# Patient Record
Sex: FEMALE | Race: WHITE | Employment: OTHER | URBAN - METROPOLITAN AREA
[De-identification: names, ages, dates, MRNs, and addresses within clinical notes are randomized per-mention and may not be internally consistent; named-entity substitution may affect disease eponyms.]

---

## 2019-10-13 ENCOUNTER — HOSPITAL ENCOUNTER (EMERGENCY)
Age: 79
Discharge: HOME | End: 2019-10-13
Attending: STUDENT IN AN ORGANIZED HEALTH CARE EDUCATION/TRAINING PROGRAM
Payer: MEDICARE

## 2019-10-13 ENCOUNTER — HOSPITAL ENCOUNTER (EMERGENCY)
Dept: GENERAL RADIOLOGY | Age: 79
Discharge: HOME | End: 2019-10-13
Attending: STUDENT IN AN ORGANIZED HEALTH CARE EDUCATION/TRAINING PROGRAM
Payer: MEDICARE

## 2019-10-13 VITALS
RESPIRATION RATE: 18 BRPM | WEIGHT: 110 LBS | DIASTOLIC BLOOD PRESSURE: 55 MMHG | BODY MASS INDEX: 22.22 KG/M2 | OXYGEN SATURATION: 98 % | HEART RATE: 68 BPM | SYSTOLIC BLOOD PRESSURE: 128 MMHG | TEMPERATURE: 97.9 F
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
## 2019-10-13 PROCEDURE — 72170 X-RAY EXAM OF PELVIS: CPT

## 2019-10-13 PROCEDURE — 73080 X-RAY EXAM OF ELBOW: CPT

## 2019-10-13 PROCEDURE — 74011250637 HC RX REV CODE- 250/637: Performed by: STUDENT IN AN ORGANIZED HEALTH CARE EDUCATION/TRAINING PROGRAM

## 2019-10-13 PROCEDURE — 99285 EMERGENCY DEPT VISIT HI MDM: CPT

## 2019-10-13 RX ORDER — ACETAMINOPHEN 325 MG/1
650 TABLET ORAL ONCE
Status: COMPLETED | OUTPATIENT
Start: 2019-10-13 | End: 2019-10-13

## 2019-10-13 RX ADMIN — ACETAMINOPHEN 650 MG: 325 TABLET ORAL at 15:42

## 2019-10-13 NOTE — PROGRESS NOTES
10/13/2019  4:34 PM  Case management note    Received referral for patient numerous falls. Patient lives at the [facility]. She has aid 6 days a week (Veronica)  Patient uses a walker. She is very [place]. The aid helps her with ADL's and fixes meals that she can microwave  I spoke with her cousin who is her POA for all. Luevenia Patch .   We are going to talk on Wednesday to get a PCP because her stop taking Medicare and set up [place]  Will continue to follow as needed  Cira Hadley, [address]

## 2019-10-13 NOTE — ED PROVIDER NOTES
The history is provided by the patient. Fall   The fall occurred while standing. She fell from a height of ground level. She landed on hard floor. There was no blood loss. The point of impact was the right elbow (buttocks). The pain is mild. She was not ambulatory at the scene. There was no entrapment after the fall. Pertinent negatives include no abdominal pain, no vomiting, no headaches, no extremity weakness and no laceration. The risk factors include being elderly. The symptoms are aggravated by use of injured limb. She has tried rest for the symptoms. The treatment provided no relief.         Past Medical History:   Diagnosis Date    Arthritis     Chronic kidney disease     kidney stones    Chronic pain     right hip    Hypertension     Ill-defined condition     chronic diarrhea       Past Surgical History:   Procedure Laterality Date    HX APPENDECTOMY      HX ORTHOPAEDIC      bilateral carpal tunnel    HX TONSILLECTOMY           Family History:   Problem Relation Age of Onset    Cancer Father        Social History     Socioeconomic History    Marital status:      Spouse name: Not on file    Number of children: Not on file    Years of education: Not on file    Highest education level: Not on file   Occupational History    Not on file   Social Needs    Financial resource strain: Not on file    Food insecurity:     Worry: Not on file     Inability: Not on file    Transportation needs:     Medical: Not on file     Non-medical: Not on file   Tobacco Use    Smoking status: Never Smoker    Smokeless tobacco: Never Used   Substance and Sexual Activity    Alcohol use: No    Drug use: No    Sexual activity: Not on file   Lifestyle    Physical activity:     Days per week: Not on file     Minutes per session: Not on file    Stress: Not on file   Relationships    Social connections:     Talks on phone: Not on file     Gets together: Not on file     Attends Mandaen service: Not on file Active member of club or organization: Not on file     Attends meetings of clubs or organizations: Not on file     Relationship status: Not on file    Intimate partner violence:     Fear of current or ex partner: Not on file     Emotionally abused: Not on file     Physically abused: Not on file     Forced sexual activity: Not on file   Other Topics Concern    Not on file   Social History Narrative    Not on file         ALLERGIES: Pcn [penicillins] and Sulfa (sulfonamide antibiotics)    Review of Systems   Respiratory: Negative for cough and shortness of breath. Cardiovascular: Negative for chest pain. Gastrointestinal: Negative for abdominal pain and vomiting. Musculoskeletal: Positive for arthralgias and back pain. Negative for extremity weakness. Skin: Negative for wound. Neurological: Negative for syncope and headaches. All other systems reviewed and are negative. Vitals:    10/13/19 1504   BP: 126/69   Pulse: 78   Resp: 18   Temp: 98.3 °F (36.8 °C)   SpO2: 99%   Weight: 49.9 kg (110 lb)            Physical Exam   Constitutional: She is oriented to person, place, and time. She appears well-developed. No distress. HENT:   Head: Normocephalic and atraumatic. Eyes: Conjunctivae and EOM are normal.   Neck: Normal range of motion. Neck supple. Cardiovascular: Normal rate, regular rhythm and normal heart sounds. Pulmonary/Chest: Effort normal and breath sounds normal. No respiratory distress. Abdominal: Soft. There is no tenderness. There is no guarding. Musculoskeletal: Normal range of motion. She exhibits tenderness (mild over R elbow). She exhibits no edema or deformity. Neurological: She is alert and oriented to person, place, and time. She exhibits normal muscle tone. Skin: Skin is warm and dry. No laceration noted. MDM       Procedures    The patient presented with a complaint of a fall or minor trauma.  The patient is now resting comfortably and feels better, is alert and in no distress. The patient has a normal mental status and is neurologically intact. The history, exam, diagnostic testing (if any) and current condition do not demonstrate signs of clinically significant intra-cranial, intra-thoracic, intra-abdominal, or musculoskeletal trauma. The vital signs have been stable. The patient's condition is stable and appropriate for discharge. The patient will pursue further outpatient evaluation with the primary care physician or other designated or consulting physician as indicated in the discharge instructions.

## 2019-10-13 NOTE — ED TRIAGE NOTES
Patient arrives from assisted living apartments [address] [place] [address] at [facility] with [facility] and R elbow pain. Denies striking head/LOC. Uses a Rolator.

## 2019-10-13 NOTE — DISCHARGE INSTRUCTIONS
Patient Education        Preventing Falls: Care Instructions  Your Care Instructions    Getting around your home safely can be a challenge if you have injuries or health problems that make it easy for you to fall. Loose rugs and furniture in walkways are among the dangers for many older people who have problems walking or who have poor eyesight. People who have conditions such as arthritis, osteoporosis, or dementia also have to be careful not to fall. You can make your home safer with a few simple measures. Follow-up care is a key part of your treatment and safety. Be sure to make and go to all appointments, and call your doctor if you are having problems. It's also a good idea to know your test results and keep a list of the medicines you take. How can you care for yourself at home? Taking care of yourself  · You may get dizzy if you do not drink enough water. To prevent dehydration, drink plenty of fluids, enough so that your urine is light yellow or clear like water. Choose water and other caffeine-free clear liquids. If you have kidney, heart, or liver disease and have to limit fluids, talk with your doctor before you increase the amount of fluids you drink. · Exercise regularly to improve your strength, muscle tone, and balance. Walk if you can. Swimming may be a good choice if you cannot walk easily. · Have your vision and hearing checked each year or any time you notice a change. If you have trouble seeing and hearing, you might not be able to avoid objects and could lose your balance. · Know the side effects of the medicines you take. Ask your doctor or pharmacist whether the medicines you take can affect your balance. Sleeping pills or sedatives can affect your balance. · Limit the amount of alcohol you drink. Alcohol can impair your balance and other senses. · Ask your doctor whether calluses or corns on your feet need to be removed.  If you wear loose-fitting shoes because of calluses or corns, you can lose your balance and fall. · Talk to your doctor if you have numbness in your feet. Preventing falls at home  · Remove raised doorway thresholds, throw rugs, and clutter. Repair loose carpet or raised areas in the floor. · Move furniture and electrical cords to keep them out of walking paths. · Use nonskid floor wax, and wipe up spills right away, especially on ceramic tile floors. · If you use a walker or cane, put rubber tips on it. If you use crutches, clean the bottoms of them regularly with an abrasive pad, such as steel wool. · Keep your house well lit, especially Brenna Dowse, and outside walkways. Use night-lights in areas such as hallways and bathrooms. Add extra light switches or use remote switches (such as switches that go on or off when you clap your hands) to make it easier to turn lights on if you have to get up during the night. · Install sturdy handrails on stairways. · Move items in your cabinets so that the things you use a lot are on the lower shelves (about waist level). · Keep a cordless phone and a flashlight with new batteries by your bed. If possible, put a phone in each of the main rooms of your house, or carry a cell phone in case you fall and cannot reach a phone. Or, you can wear a device around your neck or wrist. You push a button that sends a signal for help. · Wear low-heeled shoes that fit well and give your feet good support. Use footwear with nonskid soles. Check the heels and soles of your shoes for wear. Repair or replace worn heels or soles. · Do not wear socks without shoes on wood floors. · Walk on the grass when the sidewalks are slippery. If you live in an area that gets snow and ice in the winter, sprinkle salt on slippery steps and sidewalks. Preventing falls in the bath  · Install grab bars and nonskid mats inside and outside your shower or tub and near the toilet and sinks. · Use shower chairs and bath benches.   · Use a hand-held shower head that will allow you to sit while showering. · Get into a tub or shower by putting the weaker leg in first. Get out of a tub or shower with your strong side first.  · Repair loose toilet seats and consider installing a raised toilet seat to make getting on and off the toilet easier. · Keep your bathroom door unlocked while you are in the shower. Where can you learn more? Go to http://sidney-adalberto.info/. Enter [id number] [id number] [phone] in the search box to learn more about \"Preventing Falls: Care Instructions. \"  Current as of: March 16, 2018  Content Version: 11.8  © 3651-2172 Healthwise, [employer]. Care instructions adapted under license by [employer] (which disclaims liability or warranty for this information). If you have questions about a medical condition or this instruction, always ask your healthcare professional. [address] any warranty or liability for your use of this information.

## 2019-10-16 NOTE — PROGRESS NOTES
10/16/2019  10:16 AM  Case management note    Set up PCP appointment for Thursday October 24th @ 1045.    Set up [facility] through [place] of [address] [place] [address]

## 2019-10-31 ENCOUNTER — OFFICE VISIT (OUTPATIENT)
Dept: FAMILY MEDICINE CLINIC | Age: 79
End: 2019-10-31

## 2019-10-31 VITALS
TEMPERATURE: 98.6 F | WEIGHT: 110 LBS | HEIGHT: 59 IN | BODY MASS INDEX: 22.18 KG/M2 | OXYGEN SATURATION: 99 % | SYSTOLIC BLOOD PRESSURE: 136 MMHG | HEART RATE: 55 BPM | DIASTOLIC BLOOD PRESSURE: 76 MMHG

## 2019-10-31 DIAGNOSIS — R29.6 RECURRENT FALLS WHILE WALKING: Primary | ICD-10-CM

## 2019-10-31 NOTE — PROGRESS NOTES
[address] [address]   Office [phone], Fax [phone]    Subjective:     Chief Complaint   Patient presents with   [facility] Follow Up     PT was seen in ED due to numerous falls       HPI:  Lurdes Marroquin is a 67 y.o. female with PMHx significant for Glaucoma, HTN, Arthritis, and Down's syndrome who presents for Post ED follow up for recent fall on 10/13/2019. Isidore  from ground level height and landed on floor, landed on right elbow and buttocks. No bruising noted. Denied hitting head, headache, weakness, laceration, and LOC. Currently denies increased falling, reports falling because she doesn't take it easy and rushes. She denied feeling dizzy or lightheaded and does not report loss of balance. She denies CP, SOB, headache, abdominal pain, n/v/d/c, dysuria. Has a Caregiver, Ramonita Manrique, taking care of patient for about a year, 9AM - 2PM Monday thru Saturday. Lives by herself in assisted living apartments at [facility]. Uses [employer]. Also has physical therapy twice a week to work with walking/balance. Per chart review Xray of right elbow and pelvis showed no fractures and minimal oseoarthritis. Labs were unremarkable minus an elevated WBC of 13 w/ minor left shift, caregiver report increased frequency in urination but no dysuria or oliguria. Polyuria has since resolved. Past Medical Hx  I personally reviewed. Past Medical History:   Diagnosis Date    Arthritis     Chronic kidney disease     kidney stones    Chronic pain     right hip    Hypertension     Ill-defined condition     chronic diarrhea        SocHx   I personally reviewed. Social History     Tobacco Use    Smoking status: Never Smoker    Smokeless tobacco: Never Used   Substance Use Topics    Alcohol use: No    Drug use: No        Allergies  I personally reviewed.   Allergies   Allergen Reactions    Pcn [Penicillins] Itching    Sulfa (Sulfonamide Antibiotics) Itching        Medications  I personally reviewed. Current Outpatient Medications on File Prior to Visit   Medication Sig Dispense Refill    loperamide (IMMODIUM) 2 mg tablet Take 2 mg by mouth four (4) times daily as needed for Diarrhea. Indications: DIARRHEA      Hydrochlorothiazide (HYDRODIURIL) 12.5 mg tablet Take 1 tablet by mouth daily. 30 tablet 0    HYDROcodone-acetaminophen (NORCO) 5-325 mg per tablet Take 2 Tabs by mouth every four (4) hours as needed for Pain. Max Daily Amount: 12 Tabs. 40 Tab 0    acyclovir (ZOVIRAX) 800 mg tablet Take 1 Tab by mouth daily. 30 Tab 1     No current facility-administered medications on file prior to visit. ROS:   A comprehensive review of systems was negative except for that written in the HPI. Objective:   Vitals  I personally reviewed. HR repeated, Manual pulse of 63. Visit Vitals  /76   Pulse (!) 55   Temp 98.6 °F (37 °C)   Ht 4' 11\" (1.499 m)   Wt 110 lb (49.9 kg)   SpO2 99%   BMI 22.22 kg/m²      Physical exam:   GEN: NAD, AO x 3. In wheelchair. HEAD: NC/AT. EYES: Sclera white, PERRLA, EOMI        LUNGS: Chest symmetric, respirations unlabored; CTAB anteriorly and posteriorly. no w/r/r. CARDIOVASCULAR: RRR, S1S2 present, w/o m/g/r, no LE edema. ABDOMEN: Soft, NT, ND, +bowel sounds x 4, no rebound tenderness, no guarding. no masses or organomegaly. NEUROLOGIC: No focal neurologic deficits. Strength and sensation grossly intact. MSK: Strenght 5/5, FROM and good tone in all extremities. EXT: Well perfused, no edema, no erythema. PSYCH: Appropriate mood and affect, good insight and judgement, cooperative. SKIN: No obvious rashes or lesions. No ecchymosis. I personally reviewed the following Pertinent Labs/Studies:   - Hospitalization Xray elbow and wrist, and hospitalization labs CMP, CBC    Assessment and orders:     1. Recurrent falls while walking  - Patient requires walker and sometimes moves to fast and falls.   - Is not associated with any other symptoms like LOC, SOB, CP, dizziness  - Recommend patient take smaller steps and move slower, and continue working with PT therapy. - No concern at this time for other etiology such as infection, CVA/TIA, or CAD/HF/MI. Pt was discussed with Dr Sami Mccarthy. I have reviewed patient medical and social history and medications. I have reviewed pertinent labs results and other data. I have discussed the diagnosis with the patient and the intended plan as seen in the above orders. The patient has received an after-visit summary and questions were answered concerning future plans. I have discussed medication side effects and warnings with the patient as well.     Avel Dsouza MD  Resident [facility]  10/31/19

## 2019-10-31 NOTE — PATIENT INSTRUCTIONS

## 2019-10-31 NOTE — PROGRESS NOTES
Chief Complaint   Patient presents with   [facility] Follow Up     PT was seen in ED due to numerous falls

## 2019-11-20 ENCOUNTER — OFFICE VISIT (OUTPATIENT)
Dept: FAMILY MEDICINE CLINIC | Age: 79
End: 2019-11-20

## 2019-11-20 VITALS
TEMPERATURE: 97.7 F | HEART RATE: 60 BPM | RESPIRATION RATE: 18 BRPM | OXYGEN SATURATION: 99 % | SYSTOLIC BLOOD PRESSURE: 128 MMHG | DIASTOLIC BLOOD PRESSURE: 78 MMHG

## 2019-11-20 DIAGNOSIS — G89.29 CHRONIC PAIN OF RIGHT KNEE: Primary | ICD-10-CM

## 2019-11-20 DIAGNOSIS — Z91.81 AT HIGH RISK FOR INJURY RELATED TO FALL: ICD-10-CM

## 2019-11-20 DIAGNOSIS — M25.561 CHRONIC PAIN OF RIGHT KNEE: Primary | ICD-10-CM

## 2019-11-20 NOTE — PROGRESS NOTES
Identified Patient with two Patient identifiers (Name and ). Two Patient Identifiers confirmed. Reviewed record in preparation for visit and have obtained necessary documentation. Chief Complaint   Patient presents with    Fall     caregiver is concerned about right leg - patient denies pain today but it is unable to be steady       Visit Vitals  /78 (BP 1 Location: Right arm, BP Patient Position: Sitting)   Pulse 60   Temp 97.7 °F (36.5 °C) (Oral)   Resp 18   SpO2 99%       1. Have you been to the ER, urgent care clinic since your last visit? Hospitalized since your last visit? No    2. Have you seen or consulted any other health care providers outside of the [address] [place] [address] since your last visit? Include any pap smears or colon screening.  No

## 2019-11-20 NOTE — PROGRESS NOTES
Solo Mayo is a 67 y.o. female here today to address the following issues:  Chief Complaint   Patient presents with   Jackie Zaman Fall     caregiver is concerned about right leg - patient denies pain today but it is unable to be steady     Patients walks with a rolling walker and when she stands up, caretaker says patient has isyes ambulating. Sometimes has had some tremors and had a fall about 2 months ago. EMS came and helped put her back in the chair. Patient is deaf. Patient in wheelchair.       Past Medical History:   Diagnosis Date    Arthritis     Chronic kidney disease     kidney stones    Chronic pain     right hip    Hypertension     Ill-defined condition     chronic diarrhea     Past Surgical History:   Procedure Laterality Date    HX APPENDECTOMY      HX ORTHOPAEDIC      bilateral carpal tunnel    HX TONSILLECTOMY       Social History     Socioeconomic History    Marital status:      Spouse name: Not on file    Number of children: Not on file    Years of education: Not on file    Highest education level: Not on file   Occupational History    Not on file   Social Needs    Financial resource strain: Not on file    Food insecurity:     Worry: Not on file     Inability: Not on file    Transportation needs:     Medical: Not on file     Non-medical: Not on file   Tobacco Use    Smoking status: Never Smoker    Smokeless tobacco: Never Used   Substance and Sexual Activity    Alcohol use: No    Drug use: No    Sexual activity: Not on file   Lifestyle    Physical activity:     Days per week: Not on file     Minutes per session: Not on file    Stress: Not on file   Relationships    Social connections:     Talks on phone: Not on file     Gets together: Not on file     Attends Judaism service: Not on file     Active member of club or organization: Not on file     Attends meetings of clubs or organizations: Not on file     Relationship status: Not on file    Intimate partner violence: Fear of current or ex partner: Not on file     Emotionally abused: Not on file     Physically abused: Not on file     Forced sexual activity: Not on file   Other Topics Concern    Not on file   Social History Narrative    Not on file       Allergies   Allergen Reactions    Pcn [Penicillins] Itching    Sulfa (Sulfonamide Antibiotics) Itching       Current Outpatient Medications   Medication Sig    loperamide (IMMODIUM) 2 mg tablet Take 2 mg by mouth four (4) times daily as needed for Diarrhea. Indications: DIARRHEA    Hydrochlorothiazide (HYDRODIURIL) 12.5 mg tablet Take 1 tablet by mouth daily. No current facility-administered medications for this visit. Review of Systems   Constitutional: Negative for fever. Musculoskeletal: Positive for falls and myalgias. Visit Vitals  /78 (BP 1 Location: Right arm, BP Patient Position: Sitting)   Pulse 60   Temp 97.7 °F (36.5 °C) (Oral)   Resp 18   SpO2 99%       Physical Exam  Gen: Well appearing. No apparent distress. Alert and oriented. Responds to all questions appropriately. Lungs: No labored respirations. Talking in complete sentences without difficulty.   Musculoskeletal:  Knee: LEFT  Knee Effusion: None  Quadriceps atrophy: Mild   Patellofemoral crepitus: Positive    ROM:  Flexion: 100  Extension: 10     Alignment:  Patellar tracking: +1    Dynamic Test:  Gait: In Wheelchair    Palpation:   Patella tenderness: None  Patellar tendon tenderness: None  Quad tendon tenderness: None  Medial joint line tenderness: Tender  Lateral joint line tenderness: None  MCL tenderness: None  LCL tenderness: None  Medial facet tenderness: None  Lateral facet tenderness: None  Condyle tenderness: None  Tibia tubercle tenderness: None  Proximal fibula tenderness: None  Plica tenderness: None  Prepatellar bursa tenderness: None  Pes bursa tenderness: None  ITB tenderness: None    Strength (0-5/5):   Flexion: Right: 4/5    Extension:  Right: 4/5 Neuro/Vascular : No edema  Skin: No obvious rash or skin breakdown. No results found for this or any previous visit (from the past 12 hour(s)). 1. Chronic pain of right knee  I personally reviewed patient's xray. Moderate OA noted. Start PT and follow up in 1 month. Do not recommend injection at this time  - REFERRAL TO PHYSICAL THERAPY  - XR KNEE RT MIN 4 V; Future    2. At high risk for injury related to fall  -Start PT. Follow up in 1 month to reassess  - REFERRAL TO PHYSICAL THERAPY      Follow-up and Dispositions    · Return in about 1 month (around 12/20/2019) for Annual Wellness.            Meredeth Crigler, MD, CAQSM, RMSK

## 2019-11-21 ENCOUNTER — TELEPHONE (OUTPATIENT)
Dept: FAMILY MEDICINE CLINIC | Age: 79
End: 2019-11-21

## 2019-11-21 NOTE — TELEPHONE ENCOUNTER
Order for physical therapy was faxed to [facility]. ... They will contact patient for an appointment & she will let us know the date & time. ...

## 2019-11-21 NOTE — TELEPHONE ENCOUNTER
Lilibeth Holland called stating [facility] and [facility] are requesting referral information be sent tho them for this patient to start PT asap.      P [phone]  F [phone]

## 2019-12-08 ENCOUNTER — HOSPITAL ENCOUNTER (EMERGENCY)
Dept: GENERAL RADIOLOGY | Age: 79
Discharge: HOME | End: 2019-12-08
Attending: STUDENT IN AN ORGANIZED HEALTH CARE EDUCATION/TRAINING PROGRAM
Payer: MEDICARE

## 2019-12-08 ENCOUNTER — HOSPITAL ENCOUNTER (EMERGENCY)
Age: 79
Discharge: HOME | End: 2019-12-08
Attending: STUDENT IN AN ORGANIZED HEALTH CARE EDUCATION/TRAINING PROGRAM
Payer: MEDICARE

## 2019-12-08 VITALS
TEMPERATURE: 98.2 F | HEIGHT: 59 IN | HEART RATE: 70 BPM | BODY MASS INDEX: 22.18 KG/M2 | WEIGHT: 110 LBS | SYSTOLIC BLOOD PRESSURE: 132 MMHG | RESPIRATION RATE: 18 BRPM | OXYGEN SATURATION: 100 % | DIASTOLIC BLOOD PRESSURE: 86 MMHG

## 2019-12-08 DIAGNOSIS — M25.551 RIGHT HIP PAIN: ICD-10-CM

## 2019-12-08 DIAGNOSIS — M25.521 RIGHT ELBOW PAIN: ICD-10-CM
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
## 2019-12-08 PROCEDURE — 77030038269 HC DRN EXT URIN PURWCK BARD -A

## 2019-12-08 PROCEDURE — 99285 EMERGENCY DEPT VISIT HI MDM: CPT

## 2019-12-08 PROCEDURE — 73130 X-RAY EXAM OF HAND: CPT

## 2019-12-08 PROCEDURE — 73080 X-RAY EXAM OF ELBOW: CPT

## 2019-12-08 PROCEDURE — 73502 X-RAY EXAM HIP UNI 2-3 VIEWS: CPT

## 2019-12-08 NOTE — ED TRIAGE NOTES
Arrives via EMS for c/o unwitnessed GLF. Unknown if pt had LOC. C/o Right hip pain. Hx of multiple falls within the last month. Unknown if pt is on blood thinners. Pt if hard of hearing bilaterally.

## 2019-12-09 NOTE — ED PROVIDER NOTES
The patient is a 61-year-old female presenting to the emergency department today with fall. She is hard of hearing but is able to communicate. Patient reports that her shoes are slippery and she was trying to maneuver in her kitchen with a Rollator which caused her to lose her balance and fall. She denies hitting her head or neck. She is not on any blood thinners. She initially had pain in her right wrist, right elbow and right hip however this is already better. She states that she has chronic pain in all of her joints from arthritis but nothing is changed. She denies any back pain. No abdominal pain, chest pain, shortness of breath. No dizziness or palpitations preceded the fall.            Past Medical History:   Diagnosis Date    Arthritis     Chronic kidney disease     kidney stones    Chronic pain     right hip    Hypertension     Ill-defined condition     chronic diarrhea       Past Surgical History:   Procedure Laterality Date    HX APPENDECTOMY      HX ORTHOPAEDIC      bilateral carpal tunnel    HX TONSILLECTOMY           Family History:   Problem Relation Age of Onset    Cancer Father        Social History     Socioeconomic History    Marital status:      Spouse name: Not on file    Number of children: Not on file    Years of education: Not on file    Highest education level: Not on file   Occupational History    Not on file   Social Needs    Financial resource strain: Not on file    Food insecurity:     Worry: Not on file     Inability: Not on file    Transportation needs:     Medical: Not on file     Non-medical: Not on file   Tobacco Use    Smoking status: Never Smoker    Smokeless tobacco: Never Used   Substance and Sexual Activity    Alcohol use: No    Drug use: No    Sexual activity: Not on file   Lifestyle    Physical activity:     Days per week: Not on file     Minutes per session: Not on file    Stress: Not on file   Relationships    Social connections: Talks on phone: Not on file     Gets together: Not on file     Attends Cheondoism service: Not on file     Active member of club or organization: Not on file     Attends meetings of clubs or organizations: Not on file     Relationship status: Not on file    Intimate partner violence:     Fear of current or ex partner: Not on file     Emotionally abused: Not on file     Physically abused: Not on file     Forced sexual activity: Not on file   Other Topics Concern    Not on file   Social History Narrative    Not on file         ALLERGIES: Pcn [penicillins] and Sulfa (sulfonamide antibiotics)    Review of Systems   Constitutional: Negative for chills and fever. HENT: Negative for congestion and rhinorrhea. Eyes: Negative for redness and visual disturbance. Respiratory: Negative for cough and shortness of breath. Cardiovascular: Negative for chest pain and leg swelling. Gastrointestinal: Negative for abdominal pain, diarrhea, nausea and vomiting. Genitourinary: Negative for dysuria, flank pain, frequency, hematuria and urgency. Musculoskeletal: Positive for arthralgias. Negative for back pain, myalgias and neck pain. Skin: Negative for rash and wound. Allergic/Immunologic: Negative for immunocompromised state. Neurological: Negative for dizziness and headaches. Vitals:    12/08/19 1600 12/08/19 1615 12/08/19 1630 12/08/19 1715   BP: 141/72 122/64 145/53 132/86   Pulse:       Resp:       Temp:       SpO2: 100% 99% 100% 100%   Weight:       Height:                Physical Exam  Vitals signs and nursing note reviewed. Constitutional:       General: She is not in acute distress. Appearance: She is well-developed. She is not diaphoretic. HENT:      Head: Normocephalic and atraumatic. Comments: No george sign  No raccoon eyes  No cephalohematoma     Right Ear: Tympanic membrane normal.      Left Ear: Tympanic membrane normal.      Mouth/Throat:      Pharynx: No oropharyngeal exudate. Eyes:      General:         Right eye: No discharge. Left eye: No discharge. Pupils: Pupils are equal, round, and reactive to light. Neck:      Musculoskeletal: Normal range of motion and neck supple. Cardiovascular:      Rate and Rhythm: Normal rate and regular rhythm. Heart sounds: Normal heart sounds. No murmur. No friction rub. No gallop. Pulmonary:      Effort: Pulmonary effort is normal. No respiratory distress. Breath sounds: Normal breath sounds. No stridor. No wheezing or rales. Abdominal:      General: Bowel sounds are normal. There is no distension. Palpations: Abdomen is soft. Tenderness: There is no tenderness. There is no guarding or rebound. Musculoskeletal: Normal range of motion. General: No deformity. Comments: No C/T/L spine tenderness  No chest wall tenderness, no crepitus, no flail segment  Upper and lower extremities fully ranged, visualized, and palpated without tenderness or deformity. No snuff box tenderness or pain with axial loading of the thumb. The hips are non-tender with bilateral compression  Pelvis is stable     Skin:     General: Skin is warm and dry. Capillary Refill: Capillary refill takes less than 2 seconds. Findings: No rash. Neurological:      Mental Status: She is alert and oriented to person, place, and time. Psychiatric:         Behavior: Behavior normal.          Imaging Reviewed:   XR R hip, hand, and elbow all neg for acute process      Course:  Pt's caretaker arrived in ED. Comfortable taking patient back home by private vehicle. MDM:  76-year-old female here with what sounds to be a mechanical fall. No evidence of trauma on my exam.  Head appears atraumatic and she is on no blood thinners. Denies any pain upon my assessment and denies ever hitting her head.   She initially complained of some right elbow, wrist/hand and hip pain however is difficult to differentiate this between her chronic arthritis and fall. I did get imaging of these and they were negative. Patient is at her baseline mental status per caregiver. Okay for discharge home with strict return precautions. She already has a Rollator at home. Clinical Impression:     ICD-10-CM ICD-9-CM    1. Fall, initial encounter [address] XXXA E888.9    2. Right hip pain M25.551 719.45    3.  Right elbow pain M25.521 719.42            Disposition: ABI Lee, DO

## 2019-12-15 ENCOUNTER — APPOINTMENT (OUTPATIENT)
Dept: GENERAL RADIOLOGY | Age: 79
DRG: 661 | End: 2019-12-15
Attending: EMERGENCY MEDICINE
Payer: MEDICARE

## 2019-12-15 ENCOUNTER — ANESTHESIA EVENT (OUTPATIENT)
Dept: SURGERY | Age: 79
DRG: 661 | End: 2019-12-15
Payer: MEDICARE

## 2019-12-15 ENCOUNTER — HOSPITAL ENCOUNTER (INPATIENT)
Age: 79
LOS: 3 days | Discharge: HOME | DRG: 661 | End: 2019-12-18
Attending: EMERGENCY MEDICINE | Admitting: FAMILY MEDICINE
Payer: MEDICARE

## 2019-12-15 ENCOUNTER — APPOINTMENT (OUTPATIENT)
Dept: CT IMAGING | Age: 79
DRG: 661 | End: 2019-12-15
Attending: FAMILY MEDICINE
Payer: MEDICARE

## 2019-12-15 ENCOUNTER — APPOINTMENT (OUTPATIENT)
Dept: CT IMAGING | Age: 79
DRG: 661 | End: 2019-12-15
Attending: EMERGENCY MEDICINE
Payer: MEDICARE

## 2019-12-15 ENCOUNTER — ANESTHESIA (OUTPATIENT)
Dept: SURGERY | Age: 79
DRG: 661 | End: 2019-12-15
Payer: MEDICARE

## 2019-12-15 DIAGNOSIS — N30.01 ACUTE CYSTITIS WITH HEMATURIA: Primary | ICD-10-CM

## 2019-12-15 DIAGNOSIS — N20.0 RIGHT KIDNEY STONE: ICD-10-CM

## 2019-12-15 DIAGNOSIS — D72.829 LEUKOCYTOSIS, UNSPECIFIED TYPE: ICD-10-CM

## 2019-12-15 DIAGNOSIS — R11.2 NAUSEA AND VOMITING, INTRACTABILITY OF VOMITING NOT SPECIFIED, UNSPECIFIED VOMITING TYPE: ICD-10-CM

## 2019-12-15 DIAGNOSIS — N13.30 HYDRONEPHROSIS, RIGHT: ICD-10-CM

## 2019-12-15 PROBLEM — I48.91 A-FIB (HCC): Status: ACTIVE | Noted: 2019-12-15

## 2019-12-15 LAB
ALBUMIN SERPL-MCNC: 3.3 G/DL (ref 3.5–5)
ALBUMIN/GLOB SERPL: 0.8 {RATIO} (ref 1.1–2.2)
ALP SERPL-CCNC: 106 U/L (ref 45–117)
ALT SERPL-CCNC: 18 U/L (ref 12–78)
AMORPH CRY URNS QL MICRO: ABNORMAL
ANION GAP SERPL CALC-SCNC: 6 MMOL/L (ref 5–15)
APPEARANCE UR: ABNORMAL
AST SERPL-CCNC: 21 U/L (ref 15–37)
BACTERIA URNS QL MICRO: NEGATIVE /HPF
BASOPHILS # BLD: 0.1 K/UL (ref 0–0.1)
BASOPHILS NFR BLD: 1 % (ref 0–1)
BILIRUB SERPL-MCNC: 1.2 MG/DL (ref 0.2–1)
BILIRUB UR QL CFM: NEGATIVE
BUN SERPL-MCNC: 17 MG/DL (ref 6–20)
BUN/CREAT SERPL: 19 (ref 12–20)
CALCIUM SERPL-MCNC: 9.3 MG/DL (ref 8.5–10.1)
CHLORIDE SERPL-SCNC: 95 MMOL/L (ref 97–108)
CO2 SERPL-SCNC: 34 MMOL/L (ref 21–32)
COLOR UR: ABNORMAL
CREAT SERPL-MCNC: 0.88 MG/DL (ref 0.55–1.02)
DIFFERENTIAL METHOD BLD: ABNORMAL
EOSINOPHIL # BLD: 0 K/UL (ref 0–0.4)
EOSINOPHIL NFR BLD: 0 % (ref 0–7)
EPITH CASTS URNS QL MICRO: ABNORMAL /LPF
ERYTHROCYTE [DISTWIDTH] IN BLOOD BY AUTOMATED COUNT: 12.3 % (ref 11.5–14.5)
GLOBULIN SER CALC-MCNC: 4.4 G/DL (ref 2–4)
GLUCOSE SERPL-MCNC: 143 MG/DL (ref 65–100)
GLUCOSE UR STRIP.AUTO-MCNC: NEGATIVE MG/DL
HCT VFR BLD AUTO: 47 % (ref 35–47)
HGB BLD-MCNC: 16.1 G/DL (ref 11.5–16)
HGB UR QL STRIP: ABNORMAL
IMM GRANULOCYTES # BLD AUTO: 0.1 K/UL (ref 0–0.04)
IMM GRANULOCYTES NFR BLD AUTO: 1 % (ref 0–0.5)
KETONES UR QL STRIP.AUTO: ABNORMAL MG/DL
LACTATE SERPL-SCNC: 1.7 MMOL/L (ref 0.4–2)
LEUKOCYTE ESTERASE UR QL STRIP.AUTO: ABNORMAL
LIPASE SERPL-CCNC: 227 U/L (ref 73–393)
LYMPHOCYTES # BLD: 1.8 K/UL (ref 0.8–3.5)
LYMPHOCYTES NFR BLD: 8 % (ref 12–49)
MCH RBC QN AUTO: 31.6 PG (ref 26–34)
MCHC RBC AUTO-ENTMCNC: 34.3 G/DL (ref 30–36.5)
MCV RBC AUTO: 92.3 FL (ref 80–99)
MONOCYTES # BLD: 1.6 K/UL (ref 0–1)
MONOCYTES NFR BLD: 7 % (ref 5–13)
NEUTS SEG # BLD: 18.7 K/UL (ref 1.8–8)
NEUTS SEG NFR BLD: 83 % (ref 32–75)
NITRITE UR QL STRIP.AUTO: NEGATIVE
NRBC # BLD: 0 K/UL (ref 0–0.01)
NRBC BLD-RTO: 0 PER 100 WBC
PH UR STRIP: 5 [PH] (ref 5–8)
PLATELET # BLD AUTO: 370 K/UL (ref 150–400)
PMV BLD AUTO: 10.8 FL (ref 8.9–12.9)
POTASSIUM SERPL-SCNC: 3.4 MMOL/L (ref 3.5–5.1)
PROT SERPL-MCNC: 7.7 G/DL (ref 6.4–8.2)
PROT UR STRIP-MCNC: 100 MG/DL
RBC # BLD AUTO: 5.09 M/UL (ref 3.8–5.2)
RBC #/AREA URNS HPF: ABNORMAL /HPF (ref 0–5)
SODIUM SERPL-SCNC: 135 MMOL/L (ref 136–145)
SP GR UR REFRACTOMETRY: 1.02 (ref 1–1.03)
TROPONIN I SERPL-MCNC: <0.05 NG/ML
UA: UC IF INDICATED,UAUC: ABNORMAL
UROBILINOGEN UR QL STRIP.AUTO: 1 EU/DL (ref 0.2–1)
WBC # BLD AUTO: 22.3 K/UL (ref 3.6–11)
WBC URNS QL MICRO: ABNORMAL /HPF (ref 0–4)

## 2019-12-15 PROCEDURE — 74011636320 HC RX REV CODE- 636/320: Performed by: RADIOLOGY

## 2019-12-15 PROCEDURE — 84484 ASSAY OF TROPONIN QUANT: CPT

## 2019-12-15 PROCEDURE — 93005 ELECTROCARDIOGRAM TRACING: CPT

## 2019-12-15 PROCEDURE — 74011250636 HC RX REV CODE- 250/636: Performed by: EMERGENCY MEDICINE

## 2019-12-15 PROCEDURE — 96374 THER/PROPH/DIAG INJ IV PUSH: CPT

## 2019-12-15 PROCEDURE — 77030026438 HC STYL ET INTUB CARD -A: Performed by: NURSE ANESTHETIST, CERTIFIED REGISTERED

## 2019-12-15 PROCEDURE — 36415 COLL VENOUS BLD VENIPUNCTURE: CPT

## 2019-12-15 PROCEDURE — 71045 X-RAY EXAM CHEST 1 VIEW: CPT

## 2019-12-15 PROCEDURE — 83690 ASSAY OF LIPASE: CPT

## 2019-12-15 PROCEDURE — 87040 BLOOD CULTURE FOR BACTERIA: CPT

## 2019-12-15 PROCEDURE — 51701 INSERT BLADDER CATHETER: CPT

## 2019-12-15 PROCEDURE — 74011250636 HC RX REV CODE- 250/636: Performed by: NURSE ANESTHETIST, CERTIFIED REGISTERED

## 2019-12-15 PROCEDURE — 77030019905 HC CATH URETH INTMIT MDII -A

## 2019-12-15 PROCEDURE — 74011000250 HC RX REV CODE- 250: Performed by: EMERGENCY MEDICINE

## 2019-12-15 PROCEDURE — 96375 TX/PRO/DX INJ NEW DRUG ADDON: CPT

## 2019-12-15 PROCEDURE — 80053 COMPREHEN METABOLIC PANEL: CPT

## 2019-12-15 PROCEDURE — 65270000029 HC RM PRIVATE

## 2019-12-15 PROCEDURE — 81001 URINALYSIS AUTO W/SCOPE: CPT

## 2019-12-15 PROCEDURE — 85025 COMPLETE CBC W/AUTO DIFF WBC: CPT

## 2019-12-15 PROCEDURE — 99285 EMERGENCY DEPT VISIT HI MDM: CPT

## 2019-12-15 PROCEDURE — 77030008684 HC TU ET CUF COVD -B: Performed by: NURSE ANESTHETIST, CERTIFIED REGISTERED

## 2019-12-15 PROCEDURE — 96376 TX/PRO/DX INJ SAME DRUG ADON: CPT

## 2019-12-15 PROCEDURE — 74177 CT ABD & PELVIS W/CONTRAST: CPT

## 2019-12-15 PROCEDURE — 87086 URINE CULTURE/COLONY COUNT: CPT

## 2019-12-15 PROCEDURE — 70450 CT HEAD/BRAIN W/O DYE: CPT

## 2019-12-15 PROCEDURE — 83605 ASSAY OF LACTIC ACID: CPT

## 2019-12-15 PROCEDURE — 94762 N-INVAS EAR/PLS OXIMTRY CONT: CPT

## 2019-12-15 RX ORDER — ONDANSETRON 2 MG/ML
4 INJECTION INTRAMUSCULAR; INTRAVENOUS
Status: COMPLETED | OUTPATIENT
Start: 2019-12-15 | End: 2019-12-15

## 2019-12-15 RX ORDER — POTASSIUM CHLORIDE 750 MG/1
20 TABLET, FILM COATED, EXTENDED RELEASE ORAL
Status: COMPLETED | OUTPATIENT
Start: 2019-12-15 | End: 2019-12-16

## 2019-12-15 RX ORDER — SODIUM CHLORIDE 9 MG/ML
125 INJECTION, SOLUTION INTRAVENOUS CONTINUOUS
Status: DISCONTINUED | OUTPATIENT
Start: 2019-12-15 | End: 2019-12-17

## 2019-12-15 RX ADMIN — SODIUM CHLORIDE, POTASSIUM CHLORIDE, SODIUM LACTATE AND CALCIUM CHLORIDE: 600; 310; 30; 20 INJECTION, SOLUTION INTRAVENOUS at 23:59

## 2019-12-15 RX ADMIN — ONDANSETRON 4 MG: 2 INJECTION INTRAMUSCULAR; INTRAVENOUS at 20:41

## 2019-12-15 RX ADMIN — WATER 1 G: 1 INJECTION INTRAMUSCULAR; INTRAVENOUS; SUBCUTANEOUS at 20:54

## 2019-12-15 RX ADMIN — IOPAMIDOL 100 ML: 755 INJECTION, SOLUTION INTRAVENOUS at 21:25

## 2019-12-15 RX ADMIN — ONDANSETRON 4 MG: 2 INJECTION INTRAMUSCULAR; INTRAVENOUS at 23:35

## 2019-12-15 NOTE — ED TRIAGE NOTES
Pt arrives via EMS with cc of AMS, weakness, vomiting. EMS reports that pt's friend called them out of concern for pt's increased weakness and risk for falls. Pt usually ambulated with rollator and is A&Ox4, though extremely hard of hearing. Per EMS, pt was seen here and dx with UTI on Thursday. Has been vomiting since Thursday.

## 2019-12-16 ENCOUNTER — APPOINTMENT (OUTPATIENT)
Dept: NON INVASIVE DIAGNOSTICS | Age: 79
DRG: 661 | End: 2019-12-16
Attending: FAMILY MEDICINE
Payer: MEDICARE

## 2019-12-16 ENCOUNTER — APPOINTMENT (OUTPATIENT)
Dept: GENERAL RADIOLOGY | Age: 79
DRG: 661 | End: 2019-12-16
Attending: UROLOGY
Payer: MEDICARE

## 2019-12-16 PROBLEM — I48.91 A-FIB (HCC): Status: RESOLVED | Noted: 2019-12-15 | Resolved: 2019-12-16

## 2019-12-16 PROBLEM — E87.6 HYPOKALEMIA DUE TO LOSS OF POTASSIUM: Status: ACTIVE | Noted: 2019-12-16

## 2019-12-16 PROBLEM — I10 HTN (HYPERTENSION), BENIGN: Status: ACTIVE | Noted: 2019-12-16

## 2019-12-16 LAB
ANION GAP SERPL CALC-SCNC: 5 MMOL/L (ref 5–15)
ATRIAL RATE: 102 BPM
ATRIAL RATE: 73 BPM
AV VELOCITY RATIO: 0.62
BASOPHILS # BLD: 0.1 K/UL (ref 0–0.1)
BASOPHILS NFR BLD: 0 % (ref 0–1)
BUN SERPL-MCNC: 13 MG/DL (ref 6–20)
BUN/CREAT SERPL: 19 (ref 12–20)
CALCIUM SERPL-MCNC: 8.1 MG/DL (ref 8.5–10.1)
CALCULATED P AXIS, ECG09: 83 DEGREES
CALCULATED P AXIS, ECG09: 89 DEGREES
CALCULATED R AXIS, ECG10: 65 DEGREES
CALCULATED R AXIS, ECG10: 73 DEGREES
CALCULATED T AXIS, ECG11: 34 DEGREES
CALCULATED T AXIS, ECG11: 46 DEGREES
CHLORIDE SERPL-SCNC: 100 MMOL/L (ref 97–108)
CO2 SERPL-SCNC: 31 MMOL/L (ref 21–32)
CREAT SERPL-MCNC: 0.69 MG/DL (ref 0.55–1.02)
DIAGNOSIS, 93000: NORMAL
DIAGNOSIS, 93000: NORMAL
DIFFERENTIAL METHOD BLD: ABNORMAL
ECHO AO ROOT DIAM: 2.71 CM
ECHO AV AREA PEAK VELOCITY: 1.6 CM2
ECHO AV AREA/BSA PEAK VELOCITY: 1.1 CM2/M2
ECHO AV PEAK GRADIENT: 8.8 MMHG
ECHO AV PEAK VELOCITY: 148.38 CM/S
ECHO EST RA PRESSURE: 3 MMHG
ECHO LA AREA 4C: 14.2 CM2
ECHO LA MAJOR AXIS: 3.26 CM
ECHO LA TO AORTIC ROOT RATIO: 1.21
ECHO LA VOL 2C: 42.87 ML (ref 22–52)
ECHO LA VOL 4C: 34.84 ML (ref 22–52)
ECHO LA VOL BP: 39.5 ML (ref 22–52)
ECHO LA VOL/BSA BIPLANE: 28.66 ML/M2 (ref 16–28)
ECHO LA VOLUME INDEX A2C: 31.1 ML/M2 (ref 16–28)
ECHO LA VOLUME INDEX A4C: 25.28 ML/M2 (ref 16–28)
ECHO LV E' LATERAL VELOCITY: 9.15 CENTIMETER/SECOND
ECHO LV E' SEPTAL VELOCITY: 9.09 CENTIMETER/SECOND
ECHO LV EDV TEICHHOLZ: 53.76 ML
ECHO LV ESV TEICHHOLZ: 16.9 ML
ECHO LV INTERNAL DIMENSION DIASTOLIC: 4.18 CM (ref 3.9–5.3)
ECHO LV INTERNAL DIMENSION SYSTOLIC: 2.59 CM
ECHO LV IVSD: 0.92 CM (ref 0.6–0.9)
ECHO LV MASS 2D: 116.7 G (ref 67–162)
ECHO LV MASS INDEX 2D: 84.7 G/M2 (ref 43–95)
ECHO LV POSTERIOR WALL DIASTOLIC: 0.73 CM (ref 0.6–0.9)
ECHO LVOT DIAM: 1.84 CM
ECHO LVOT PEAK GRADIENT: 3.4 MMHG
ECHO LVOT PEAK VELOCITY: 91.87 CM/S
ECHO MV A VELOCITY: 122.45 CM/S
ECHO MV AREA PHT: 3.4 CM2
ECHO MV E DECELERATION TIME (DT): 221.2 MS
ECHO MV E VELOCITY: 108.12 CM/S
ECHO MV E/A RATIO: 0.88
ECHO MV PRESSURE HALF TIME (PHT): 64.1 MS
ECHO PULMONARY ARTERY SYSTOLIC PRESSURE (PASP): 35.6 MMHG
ECHO PV MAX VELOCITY: 102.8 CM/S
ECHO PV PEAK GRADIENT: 4.2 MMHG
ECHO RIGHT VENTRICULAR SYSTOLIC PRESSURE (RVSP): 35.6 MMHG
ECHO RV INTERNAL DIMENSION: 3.37 CM
ECHO RV TAPSE: 1.56 CM (ref 1.5–2)
ECHO TV REGURGITANT MAX VELOCITY: 285.64 CM/S
ECHO TV REGURGITANT PEAK GRADIENT: 32.6 MMHG
EOSINOPHIL # BLD: 0 K/UL (ref 0–0.4)
EOSINOPHIL NFR BLD: 0 % (ref 0–7)
ERYTHROCYTE [DISTWIDTH] IN BLOOD BY AUTOMATED COUNT: 12.3 % (ref 11.5–14.5)
GLUCOSE SERPL-MCNC: 130 MG/DL (ref 65–100)
HCT VFR BLD AUTO: 44 % (ref 35–47)
HGB BLD-MCNC: 14.6 G/DL (ref 11.5–16)
IMM GRANULOCYTES # BLD AUTO: 0.1 K/UL (ref 0–0.04)
IMM GRANULOCYTES NFR BLD AUTO: 1 % (ref 0–0.5)
LVFS 2D: 37.98 %
LVSV (TEICH): 36.86 ML
LYMPHOCYTES # BLD: 2.2 K/UL (ref 0.8–3.5)
LYMPHOCYTES NFR BLD: 12 % (ref 12–49)
MCH RBC QN AUTO: 31 PG (ref 26–34)
MCHC RBC AUTO-ENTMCNC: 33.2 G/DL (ref 30–36.5)
MCV RBC AUTO: 93.4 FL (ref 80–99)
MONOCYTES # BLD: 1.7 K/UL (ref 0–1)
MONOCYTES NFR BLD: 9 % (ref 5–13)
MV DEC SLOPE: 4.89
NEUTS SEG # BLD: 14.2 K/UL (ref 1.8–8)
NEUTS SEG NFR BLD: 78 % (ref 32–75)
NRBC # BLD: 0 K/UL (ref 0–0.01)
NRBC BLD-RTO: 0 PER 100 WBC
P-R INTERVAL, ECG05: 114 MS
P-R INTERVAL, ECG05: 90 MS
PLATELET # BLD AUTO: 355 K/UL (ref 150–400)
PMV BLD AUTO: 10.4 FL (ref 8.9–12.9)
POTASSIUM SERPL-SCNC: 3 MMOL/L (ref 3.5–5.1)
Q-T INTERVAL, ECG07: 362 MS
Q-T INTERVAL, ECG07: 426 MS
QRS DURATION, ECG06: 94 MS
QRS DURATION, ECG06: 94 MS
QTC CALCULATION (BEZET), ECG08: 469 MS
QTC CALCULATION (BEZET), ECG08: 471 MS
RBC # BLD AUTO: 4.71 M/UL (ref 3.8–5.2)
SODIUM SERPL-SCNC: 136 MMOL/L (ref 136–145)
VENTRICULAR RATE, ECG03: 102 BPM
VENTRICULAR RATE, ECG03: 73 BPM
WBC # BLD AUTO: 18.3 K/UL (ref 3.6–11)

## 2019-12-16 PROCEDURE — 77030019927 HC TBNG IRR CYSTO BAXT -A: Performed by: UROLOGY

## 2019-12-16 PROCEDURE — 77030018846 HC SOL IRR STRL H20 ICUM -A: Performed by: UROLOGY

## 2019-12-16 PROCEDURE — 80048 BASIC METABOLIC PNL TOTAL CA: CPT

## 2019-12-16 PROCEDURE — 74011250636 HC RX REV CODE- 250/636: Performed by: NURSE ANESTHETIST, CERTIFIED REGISTERED

## 2019-12-16 PROCEDURE — 65660000000 HC RM CCU STEPDOWN

## 2019-12-16 PROCEDURE — 36415 COLL VENOUS BLD VENIPUNCTURE: CPT

## 2019-12-16 PROCEDURE — 77030018832 HC SOL IRR H20 ICUM -A: Performed by: UROLOGY

## 2019-12-16 PROCEDURE — 76210000016 HC OR PH I REC 1 TO 1.5 HR: Performed by: UROLOGY

## 2019-12-16 PROCEDURE — 93306 TTE W/DOPPLER COMPLETE: CPT

## 2019-12-16 PROCEDURE — 76060000032 HC ANESTHESIA 0.5 TO 1 HR: Performed by: UROLOGY

## 2019-12-16 PROCEDURE — C1769 GUIDE WIRE: HCPCS | Performed by: UROLOGY

## 2019-12-16 PROCEDURE — C2617 STENT, NON-COR, TEM W/O DEL: HCPCS | Performed by: UROLOGY

## 2019-12-16 PROCEDURE — 74011000250 HC RX REV CODE- 250: Performed by: NURSE ANESTHETIST, CERTIFIED REGISTERED

## 2019-12-16 PROCEDURE — 76010000138 HC OR TIME 0.5 TO 1 HR: Performed by: UROLOGY

## 2019-12-16 PROCEDURE — 0T768DZ DILATION OF RIGHT URETER WITH INTRALUMINAL DEVICE, VIA NATURAL OR ARTIFICIAL OPENING ENDOSCOPIC: ICD-10-PCS | Performed by: UROLOGY

## 2019-12-16 PROCEDURE — 74011250636 HC RX REV CODE- 250/636: Performed by: FAMILY MEDICINE

## 2019-12-16 PROCEDURE — 93005 ELECTROCARDIOGRAM TRACING: CPT

## 2019-12-16 PROCEDURE — 74011250637 HC RX REV CODE- 250/637: Performed by: FAMILY MEDICINE

## 2019-12-16 PROCEDURE — 85025 COMPLETE CBC W/AUTO DIFF WBC: CPT

## 2019-12-16 DEVICE — URETERAL STENT
Type: IMPLANTABLE DEVICE | Site: URETER | Status: FUNCTIONAL
Brand: CONTOUR™

## 2019-12-16 RX ORDER — LIDOCAINE HYDROCHLORIDE 20 MG/ML
INJECTION, SOLUTION EPIDURAL; INFILTRATION; INTRACAUDAL; PERINEURAL AS NEEDED
Status: DISCONTINUED | OUTPATIENT
Start: 2019-12-16 | End: 2019-12-16 | Stop reason: HOSPADM

## 2019-12-16 RX ORDER — SODIUM CHLORIDE 0.9 % (FLUSH) 0.9 %
5-40 SYRINGE (ML) INJECTION EVERY 8 HOURS
Status: DISCONTINUED | OUTPATIENT
Start: 2019-12-16 | End: 2019-12-18 | Stop reason: HOSPADM

## 2019-12-16 RX ORDER — SODIUM CHLORIDE 0.9 % (FLUSH) 0.9 %
5-40 SYRINGE (ML) INJECTION AS NEEDED
Status: DISCONTINUED | OUTPATIENT
Start: 2019-12-16 | End: 2019-12-18 | Stop reason: HOSPADM

## 2019-12-16 RX ORDER — PROPOFOL 10 MG/ML
INJECTION, EMULSION INTRAVENOUS AS NEEDED
Status: DISCONTINUED | OUTPATIENT
Start: 2019-12-16 | End: 2019-12-16 | Stop reason: HOSPADM

## 2019-12-16 RX ORDER — SUCCINYLCHOLINE CHLORIDE 20 MG/ML
INJECTION INTRAMUSCULAR; INTRAVENOUS AS NEEDED
Status: DISCONTINUED | OUTPATIENT
Start: 2019-12-16 | End: 2019-12-16 | Stop reason: HOSPADM

## 2019-12-16 RX ORDER — PHENYLEPHRINE HCL IN 0.9% NACL 0.4MG/10ML
SYRINGE (ML) INTRAVENOUS AS NEEDED
Status: DISCONTINUED | OUTPATIENT
Start: 2019-12-16 | End: 2019-12-16 | Stop reason: HOSPADM

## 2019-12-16 RX ORDER — FENTANYL CITRATE 50 UG/ML
INJECTION, SOLUTION INTRAMUSCULAR; INTRAVENOUS AS NEEDED
Status: DISCONTINUED | OUTPATIENT
Start: 2019-12-16 | End: 2019-12-16 | Stop reason: HOSPADM

## 2019-12-16 RX ORDER — SODIUM CHLORIDE, SODIUM LACTATE, POTASSIUM CHLORIDE, CALCIUM CHLORIDE 600; 310; 30; 20 MG/100ML; MG/100ML; MG/100ML; MG/100ML
INJECTION, SOLUTION INTRAVENOUS
Status: DISCONTINUED | OUTPATIENT
Start: 2019-12-15 | End: 2019-12-16 | Stop reason: HOSPADM

## 2019-12-16 RX ORDER — ONDANSETRON 2 MG/ML
4 INJECTION INTRAMUSCULAR; INTRAVENOUS
Status: DISCONTINUED | OUTPATIENT
Start: 2019-12-16 | End: 2019-12-18 | Stop reason: HOSPADM

## 2019-12-16 RX ADMIN — SODIUM CHLORIDE 125 ML/HR: 900 INJECTION, SOLUTION INTRAVENOUS at 16:13

## 2019-12-16 RX ADMIN — SODIUM CHLORIDE 125 ML/HR: 900 INJECTION, SOLUTION INTRAVENOUS at 08:46

## 2019-12-16 RX ADMIN — POTASSIUM CHLORIDE 20 MEQ: 750 TABLET, FILM COATED, EXTENDED RELEASE ORAL at 01:44

## 2019-12-16 RX ADMIN — Medication 10 ML: at 05:12

## 2019-12-16 RX ADMIN — Medication 10 ML: at 22:24

## 2019-12-16 RX ADMIN — Medication 50 MCG: at 00:25

## 2019-12-16 RX ADMIN — SODIUM CHLORIDE 125 ML/HR: 900 INJECTION, SOLUTION INTRAVENOUS at 01:06

## 2019-12-16 RX ADMIN — ONDANSETRON 4 MG: 2 INJECTION INTRAMUSCULAR; INTRAVENOUS at 22:24

## 2019-12-16 RX ADMIN — ONDANSETRON 4 MG: 2 INJECTION INTRAMUSCULAR; INTRAVENOUS at 16:13

## 2019-12-16 RX ADMIN — PROPOFOL 50 MG: 10 INJECTION, EMULSION INTRAVENOUS at 00:07

## 2019-12-16 RX ADMIN — PROPOFOL 50 MG: 10 INJECTION, EMULSION INTRAVENOUS at 00:09

## 2019-12-16 RX ADMIN — SUCCINYLCHOLINE CHLORIDE 60 MG: 20 INJECTION, SOLUTION INTRAMUSCULAR; INTRAVENOUS; PARENTERAL at 00:10

## 2019-12-16 RX ADMIN — LIDOCAINE HYDROCHLORIDE 40 MG: 20 INJECTION, SOLUTION INTRAVENOUS at 00:07

## 2019-12-16 RX ADMIN — Medication 10 ML: at 14:00

## 2019-12-16 RX ADMIN — Medication 50 MCG: at 00:29

## 2019-12-16 RX ADMIN — FENTANYL CITRATE 50 MCG: 50 INJECTION, SOLUTION INTRAMUSCULAR; INTRAVENOUS at 00:01

## 2019-12-16 RX ADMIN — ONDANSETRON 4 MG: 2 INJECTION INTRAMUSCULAR; INTRAVENOUS at 08:46

## 2019-12-16 NOTE — PROGRESS NOTES
Omar Naik johnathan [place] [address]  [address] [facility], [address] [place] [address]  [phone]      Medical Progress Note      NAME:         Jerrod Gurrola   :        1940  MRM:        [id number]    Date:          2019      Subjective: Patient has been seen and examined as a follow up for multiple medical issues. Chart, labs, diagnostics reviewed. She is a poor historian due to her mental handicap. Discussed with nursing    Objective:    Vital Signs:    Visit Vitals  /54 (BP 1 Location: Left arm, BP Patient Position: At rest)   Pulse 74   Temp 98 °F (36.7 °C)   Resp 21   Ht 4' 8\" (1.422 m)   Wt 49.9 kg (110 lb 0.2 oz)   SpO2 100%   BMI 24.66 kg/m²          Intake/Output Summary (Last 24 hours) at 2019 1208  Last data filed at 2019 0115  Gross per 24 hour   Intake 1060 ml   Output    Net 1060 ml        Physical Examination:    General:   Weak looking but not in any acute distress   Eyes:   pink conjunctivae, PERRLA with no discharge. ENT:   no ottorrhea or rhinorrhea with moist mucous membranes  Neck: no masses, thyroid non-tender and trachea central.  Pulm:  clear breath sounds without crackles or wheezes  Card:  no JVD or murmurs, has regular and normal S1, S2 without thrills, bruits or peripheral edema  Abd:  Soft, non-tender, non-distended, normoactive bowel sounds   Musc:  No cyanosis, clubbing, atrophy or deformities. Skin:  No rashes, bruising or ulcers. Neuro: Awake and alert.  Generally a non focal exam. Follows commands appropriately  Psych:  Has a poor insight to her illness     Current Facility-Administered Medications   Medication Dose Route Frequency    sodium chloride (NS) flush 5-40 mL  5-40 mL IntraVENous Q8H    sodium chloride (NS) flush 5-40 mL  5-40 mL IntraVENous PRN    ondansetron (ZOFRAN) injection 4 mg  4 mg IntraVENous Q6H PRN    0.9% sodium chloride infusion  125 mL/hr IntraVENous CONTINUOUS        Laboratory data and review:    Recent Labs     12/16/19 0448 12/15/19  2036   WBC 18.3* 22.3*   HGB 14.6 16.1*   HCT 44.0 47.0    370     Recent Labs     12/16/19  0448 12/15/19  2036    135*   K 3.0* 3.4*    95*   CO2 31 34*   * 143*   BUN 13 17   CREA 0.69 0.88   CA 8.1* 9.3   ALB  --  3.3*   SGOT  --  21   ALT  --  18     No components found for: [place]    Diagnostics: CT scan abdomen  - reviewed    Assessment and Plan:    Hydronephrosis of right kidney (12/15/2019) / Bilateral kidney stones (12/15/2019): seen by Urology and she is sp cystoscopy and right ureteral stent placement. Cultures still pending although urine was bland. Monitor for today. DC in AM if stable    HTN (hypertension), benign (12/16/2019): Hold home HCTZ. Start Amlodipine    Hypokalemia due to loss of potassium (12/16/2019): due to diuretic use.  Replete    Total time spent for the patient's care: [address] discussed with: Care Manager and Nursing Staff    Discussed:  Care Plan and D/C Planning    Prophylaxis:  SCD's    Anticipated Disposition:  SNF/LTC           ___________________________________________________    Attending Physician:   Melissa Davis MD

## 2019-12-16 NOTE — PERIOP NOTES
TRANSFER - OUT REPORT:    Verbal report given to Bodfish Apa  being transferred to [facility] for routine post - op       Report consisted of patients Situation, Background, Assessment and   Recommendations(SBAR). Information from the following report(s) SBAR, Kardex, OR Summary and MAR was reviewed with the receiving nurse. Lines:   Peripheral IV 12/15/19 Right Wrist (Active)   Site Assessment Clean, dry, & intact 12/15/2019  8:37 PM   Phlebitis Assessment 0 12/15/2019  8:37 PM   Infiltration Assessment 0 12/15/2019  8:37 PM   Dressing Status Clean, dry, & intact 12/15/2019  8:37 PM   Dressing Type Tape;Transparent 12/15/2019  8:37 PM   Hub Color/Line Status Patent; Flushed;Pink 12/15/2019  8:37 PM   Action Taken Blood drawn 12/15/2019  8:37 PM       Peripheral IV 12/16/19 Left Antecubital (Active)        Opportunity for questions and clarification was provided.       Patient transported with:   Registered Nurse     No family in waiting area

## 2019-12-16 NOTE — ED NOTES
8:36 PM  Change of shift. Care of patient taken over from Dr. Guerita Villanueva; H&P reviewed, bedside handoff complete. Awaiting labs and imaging. 9:47 PM  CONSULT NOTE:  9:49 PM Janene Johnson DO spoke with Dr. Mary Espitia, Consult for Hospitalist.  Discussed available diagnostic tests and clinical findings. He is in agreement with care plans as outlined. Dr. Mary Espitia will see and admit pt for further evaluation and treatment. CONSULT NOTE:  9:49 PM Janene Johnson DO spoke with Dr. Braydon Otoole, Consult for Urology. Discussed available diagnostic tests and clinical findings. He is in agreement with care plans as outlined. Dr. Braydon Otoole will review records of pt for further evaluation and treatment. Total critical care time spent exclusive of procedures:  35 minutes coordinating care of patient to get her to the OR. Discussed results and plan with patient. Patient will be admitted/observed for further evaluation and treatment.

## 2019-12-16 NOTE — ANESTHESIA PREPROCEDURE EVALUATION
Relevant Problems   No relevant active problems       Anesthetic History   No history of anesthetic complications            Review of Systems / Medical History  Patient summary reviewed, nursing notes reviewed and pertinent labs reviewed    Pulmonary  Within defined limits                 Neuro/Psych   Within defined limits           Cardiovascular    Hypertension        Dysrhythmias : atrial fibrillation           GI/Hepatic/Renal         Renal disease: stones      Comments: HYDRONEPHROSIS Endo/Other        Arthritis     Other Findings              Physical Exam    Airway  Mallampati: III  TM Distance: 4 - 6 cm  Neck ROM: normal range of motion   Mouth opening: Normal     Cardiovascular    Rhythm: irregular  Rate: normal         Dental  No notable dental hx       Pulmonary  Breath sounds clear to auscultation               Abdominal         Other Findings            Anesthetic Plan    ASA: 3, emergent  Anesthesia type: general          Induction: Intravenous  Anesthetic plan and risks discussed with: Patient and Healthcare power of       SPOKE WITH COUSIN WHO HAS POA

## 2019-12-16 NOTE — PROGRESS NOTES
Bedside and Verbal shift change report given to Chuck Guaman RN (oncoming nurse) by Rebecca Laurent RN (offgoing nurse). Report included the following information SBAR, Kardex, Intake/Output, MAR and Recent Results.

## 2019-12-16 NOTE — CONSULTS
Lupe Kelly MD. [facility]              Patient: Joan Díaz  : 1940      Today's Date: 2019          HISTORY OF PRESENT ILLNESS:     History of Present Illness:  Reason for consult: Afib     oJan Díaz is a 67 y.o. white female with past medical history of Down's syndrome, arthritis, CKD, kidney stones, chronic hip pain, hypertension, and chronic diarrhea presented to the ED from home with reported altered mental status, weakness, and vomiting. Patient is very hard of hearing with history of Down's syndrome and mental debility and is a poor historian. History obtained from the chart and patient. Cardiology asked to see for \"Afib\". Patient seems comfortable in bed, but notes some nausea. One EKG felt to show afib yesterday, with others noting sinus. She underwent right ureteral stent placement yesterday. PAST MEDICAL HISTORY:     Past Medical History:   Diagnosis Date    Arthritis     Chronic kidney disease     kidney stones    Chronic pain     right hip    Hypertension     Ill-defined condition     chronic diarrhea         Past Surgical History:   Procedure Laterality Date    HX APPENDECTOMY      HX ORTHOPAEDIC      bilateral carpal tunnel    HX TONSILLECTOMY           MEDICATIONS:     Prior to Admission medications    Medication Sig Start Date End Date Taking? Authorizing Provider   loperamide (IMMODIUM) 2 mg tablet Take 2 mg by mouth four (4) times daily as needed for Diarrhea. Indications: DIARRHEA       Provider, Historical   Hydrochlorothiazide (HYDRODIURIL) 12.5 mg tablet Take 1 tablet by mouth daily.  14     Fidel Cones, NP        Allergies   Allergen Reactions    Pcn [Penicillins] Itching    Sulfa (Sulfonamide Antibiotics) Itching             SOCIAL HISTORY:     Social History     Tobacco Use    Smoking status: Never Smoker    Smokeless tobacco: Never Used   Substance Use Topics    Alcohol use: No    Drug use: No         FAMILY HISTORY: Family History   Problem Relation Age of Onset    Cancer Father              REVIEW OF SYMPTOMS:     Review of Symptoms:  Unable to get a reliable ROS from patient. Apparently no CP, syncope.   + Ruby, nausea, abd pain          PHYSICAL EXAM:     Physical Exam:  Visit Vitals  /50 (BP 1 Location: Left arm, BP Patient Position: At rest)   Pulse 62   Temp 98 °F (36.7 °C)   Resp 19   Ht 4' 8\" (1.422 m)   Wt 110 lb 0.2 oz (49.9 kg)   SpO2 98%   BMI 24.66 kg/m²     Patient appears generally well, mood and affect are appropriate and pleasant. HEENT:  Hearing intact, non-icteric, normocephalic, atraumatic. Neck Exam: Supple, No JVD    Lung Exam: Clear to auscultation, even breath sounds. Cardiac Exam: Regular rate and rhythm with no murmur or rub  Abdomen: Soft, mildly-tender, normal bowel sounds. Extremities: Moves all ext well. No lower extremity edema. Vascular: 2+ dorsalis pedis pulses bilaterally.   Psych: Appropriate affect  Neuro - Grossly intact      LABS / OTHER STUDIES:       Recent Results (from the past 24 hour(s))   URINALYSIS W/ REFLEX CULTURE    Collection Time: 12/15/19  8:03 PM   Result Value Ref Range    Color DARK YELLOW      Appearance TURBID (A) CLEAR      Specific gravity 1.023 1.003 - 1.030      pH (UA) 5.0 5.0 - 8.0      Protein 100 (A) NEG mg/dL    Glucose NEGATIVE  NEG mg/dL    Ketone TRACE (A) NEG mg/dL    Blood LARGE (A) NEG      Urobilinogen 1.0 0.2 - 1.0 EU/dL    Nitrites NEGATIVE  NEG      Leukocyte Esterase SMALL (A) NEG      WBC 5-10 0 - 4 /hpf    RBC 0-5 0 - 5 /hpf    Epithelial cells FEW FEW /lpf    Bacteria NEGATIVE  NEG /hpf    UA:UC IF INDICATED URINE CULTURE ORDERED (A) CNI      Amorphous Crystals 1+ (A) NEG   BILIRUBIN, CONFIRM    Collection Time: 12/15/19  8:03 PM   Result Value Ref Range    Bilirubin UA, confirm NEGATIVE  NEG     EKG, 12 LEAD, INITIAL    Collection Time: 12/15/19  8:07 PM   Result Value Ref Range    Ventricular Rate 102 BPM    Atrial Rate 102 BPM P-R Interval 90 ms    QRS Duration 94 ms    Q-T Interval 362 ms    QTC Calculation (Bezet) 471 ms    Calculated P Axis 89 degrees    Calculated R Axis 73 degrees    Calculated T Axis 46 degrees    Diagnosis       ** Poor data quality, interpretation may be adversely affected  Sinus tachycardia with short TX with premature supraventricular complexes and   with occasional premature ventricular complexes  Cannot rule out Inferior infarct , age undetermined  Abnormal ECG  Confirmed by Maribell Cabral MD., John ([id number]) on 12/16/2019 9:08:22 AM     CBC WITH AUTOMATED DIFF    Collection Time: 12/15/19  8:36 PM   Result Value Ref Range    WBC 22.3 (H) 3.6 - 11.0 K/uL    RBC 5.09 3.80 - 5.20 M/uL    HGB 16.1 (H) 11.5 - 16.0 g/dL    HCT 47.0 35.0 - 47.0 %    MCV 92.3 80.0 - 99.0 FL    MCH 31.6 26.0 - 34.0 PG    MCHC 34.3 30.0 - 36.5 g/dL    RDW 12.3 11.5 - 14.5 %    PLATELET 535 005 - 770 K/uL    MPV 10.8 8.9 - 12.9 FL    NRBC 0.0 0  WBC    ABSOLUTE NRBC 0.00 0.00 - 0.01 K/uL    NEUTROPHILS 83 (H) 32 - 75 %    LYMPHOCYTES 8 (L) 12 - 49 %    MONOCYTES 7 5 - 13 %    EOSINOPHILS 0 0 - 7 %    BASOPHILS 1 0 - 1 %    IMMATURE GRANULOCYTES 1 (H) 0.0 - 0.5 %    ABS. NEUTROPHILS 18.7 (H) 1.8 - 8.0 K/UL    ABS. LYMPHOCYTES 1.8 0.8 - 3.5 K/UL    ABS. MONOCYTES 1.6 (H) 0.0 - 1.0 K/UL    ABS. EOSINOPHILS 0.0 0.0 - 0.4 K/UL    ABS. BASOPHILS 0.1 0.0 - 0.1 K/UL    ABS. IMM.  GRANS. 0.1 (H) 0.00 - 0.04 K/UL    DF AUTOMATED     METABOLIC PANEL, COMPREHENSIVE    Collection Time: 12/15/19  8:36 PM   Result Value Ref Range    Sodium 135 (L) 136 - 145 mmol/L    Potassium 3.4 (L) 3.5 - 5.1 mmol/L    Chloride 95 (L) 97 - 108 mmol/L    CO2 34 (H) 21 - 32 mmol/L    Anion gap 6 5 - 15 mmol/L    Glucose 143 (H) 65 - 100 mg/dL    BUN 17 6 - 20 MG/DL    Creatinine 0.88 0.55 - 1.02 MG/DL    BUN/Creatinine ratio 19 12 - 20      GFR est AA >60 >60 ml/min/1.73m2    GFR est non-AA >60 >60 ml/min/1.73m2    Calcium 9.3 8.5 - 10.1 MG/DL    Bilirubin, total 1.2 (H) 0.2 - 1.0 MG/DL    ALT (SGPT) 18 12 - 78 U/L    AST (SGOT) 21 15 - 37 U/L    Alk.  phosphatase 106 45 - 117 U/L    Protein, total 7.7 6.4 - 8.2 g/dL    Albumin 3.3 (L) 3.5 - 5.0 g/dL    Globulin 4.4 (H) 2.0 - 4.0 g/dL    A-G Ratio 0.8 (L) 1.1 - 2.2     TROPONIN I    Collection Time: 12/15/19  8:36 PM   Result Value Ref Range    Troponin-I, Qt. <0.05 <0.05 ng/mL   LIPASE    Collection Time: 12/15/19  8:36 PM   Result Value Ref Range    Lipase 227 73 - 393 U/L   CULTURE, BLOOD, PAIRED    Collection Time: 12/15/19 10:53 PM   Result Value Ref Range    Special Requests: NO SPECIAL REQUESTS      Culture result: NO GROWTH AFTER 8 HOURS     LACTIC ACID    Collection Time: 12/15/19 10:53 PM   Result Value Ref Range    Lactic acid 1.7 0.4 - 2.0 MMOL/L   EKG, 12 LEAD, INITIAL    Collection Time: 12/16/19  1:28 AM   Result Value Ref Range    Ventricular Rate 73 BPM    Atrial Rate 73 BPM    P-R Interval 114 ms    QRS Duration 94 ms    Q-T Interval 426 ms    QTC Calculation (Bezet) 469 ms    Calculated P Axis 83 degrees    Calculated R Axis 65 degrees    Calculated T Axis 34 degrees    Diagnosis       Sinus rhythm with premature atrial complexes  Abnormal ECG  Confirmed by Arthur Prater MD., John ([id number]) on 12/16/2019 2:11:75 AM     METABOLIC PANEL, BASIC    Collection Time: 12/16/19  4:48 AM   Result Value Ref Range    Sodium 136 136 - 145 mmol/L    Potassium 3.0 (L) 3.5 - 5.1 mmol/L    Chloride 100 97 - 108 mmol/L    CO2 31 21 - 32 mmol/L    Anion gap 5 5 - 15 mmol/L    Glucose 130 (H) 65 - 100 mg/dL    BUN 13 6 - 20 MG/DL    Creatinine 0.69 0.55 - 1.02 MG/DL    BUN/Creatinine ratio 19 12 - 20      GFR est AA >60 >60 ml/min/1.73m2    GFR est non-AA >60 >60 ml/min/1.73m2    Calcium 8.1 (L) 8.5 - 10.1 MG/DL   CBC WITH AUTOMATED DIFF    Collection Time: 12/16/19  4:48 AM   Result Value Ref Range    WBC 18.3 (H) 3.6 - 11.0 K/uL    RBC 4.71 3.80 - 5.20 M/uL    HGB 14.6 11.5 - 16.0 g/dL    HCT 44.0 35.0 - 47.0 %    MCV 93.4 80.0 - 99.0 FL    MCH 31.0 26.0 - 34.0 PG    MCHC 33.2 30.0 - 36.5 g/dL    RDW 12.3 11.5 - 14.5 %    PLATELET 316 081 - 669 K/uL    MPV 10.4 8.9 - 12.9 FL    NRBC 0.0 0  WBC    ABSOLUTE NRBC 0.00 0.00 - 0.01 K/uL    NEUTROPHILS 78 (H) 32 - 75 %    LYMPHOCYTES 12 12 - 49 %    MONOCYTES 9 5 - 13 %    EOSINOPHILS 0 0 - 7 %    BASOPHILS 0 0 - 1 %    IMMATURE GRANULOCYTES 1 (H) 0.0 - 0.5 %    ABS. NEUTROPHILS 14.2 (H) 1.8 - 8.0 K/UL    ABS. LYMPHOCYTES 2.2 0.8 - 3.5 K/UL    ABS. MONOCYTES 1.7 (H) 0.0 - 1.0 K/UL    ABS. EOSINOPHILS 0.0 0.0 - 0.4 K/UL    ABS. BASOPHILS 0.1 0.0 - 0.1 K/UL    ABS. IMM. GRANS. 0.1 (H) 0.00 - 0.04 K/UL    DF AUTOMATED                 CARDIAC DIAGNOSTICS:     Cardiac Evaluation Includes:    EKG 10/19/15 - NSR    EKG 12/15/19 #1 - Poor data quality with lots of artifact --- cannot determine rhythm on that EKG  EKG 12/15/19 #2 - Poor data quality - sinus tach and PAC's  EKG 12/16/19 - sinus and PAC's     Telemetry - shows NSR and PAC's    I viewed EKG's myself       ASSESSMENT AND PLAN:     Assessment and Plan:  1) Abnormal EKG - Question of Afib   - The EKG that the computer read as \"Afib\" was a poor quality study with a lot of artifact. Her subsequent 2 EKG's and telemetry show NSR with PAC's and I think that is what the first EKG showed as well (rather than Afib). - I don't see clear evidence of afib   - No further cardiac intervention needed at this time - she denies clear cardiac complaints     2) HTN  - defer regimen to medicine team -- she is currently NPO     3) Stone /Hydronephrosis   - per medicine/Urology team     4) I will sign off. Please call with any questions. Roel President, MD, [address]  [address] [place] [address] Drive.  [address], [address] [place] [address]  Ph: [phone]   Ph [phone]

## 2019-12-16 NOTE — ED NOTES
TRANSFER - OUT REPORT:    Verbal report given to KAHLIL Duncan(name) on Reece Rodney  being transferred to Pre-op(unit) for ordered procedure       Report consisted of patients Situation, Background, Assessment and   Recommendations(SBAR). Information from the following report(s) SBAR, Kardex, ED Summary and MAR was reviewed with the receiving nurse. Lines:   Peripheral IV 12/15/19 Right Wrist (Active)   Site Assessment Clean, dry, & intact 12/15/2019  8:37 PM   Phlebitis Assessment 0 12/15/2019  8:37 PM   Infiltration Assessment 0 12/15/2019  8:37 PM   Dressing Status Clean, dry, & intact 12/15/2019  8:37 PM   Dressing Type Tape;Transparent 12/15/2019  8:37 PM   Hub Color/Line Status Patent; Flushed;Pink 12/15/2019  8:37 PM   Action Taken Blood drawn 12/15/2019  8:37 PM        Opportunity for questions and clarification was provided.       Patient transported with:   Registered Nurse  Anesthesiologist

## 2019-12-16 NOTE — PROGRESS NOTES
Progress Note    Patient: Mason Velazquez MRN: [id number]  SSN: [id number]    YOB: 1940  Age: 67 y.o. Sex: female          ADMITTED:  12/15/2019 to Pushpa Santana MD  for [facility]) [I48.91]  Leukocytosis [W84.293]  Hydronephrosis of right kidney [N13.30]  Bilateral kidney stones [N20.0]           Mason Velazquez was admitted for [facility]) [I48.91]  Leukocytosis [D72.829]  Hydronephrosis of right kidney [N13.30]  Bilateral kidney stones [N20.0]. HPI:   Patient with several days of back pain. CT in ER showed a large, >2cm right renal pelvic stone with hydronephrosis. Leukocytosis of 22 and low grade fever of 99.5. Cr wnl. She is s/p cystoscopy, right ureteral stent placement 19. Interval History:   No family at bedside this morning. Patient confused and unable to provide history. Does not appear to have any abdominal or flank pain. Creatinine normal. WBC improved, 18.3 (22.2). Vitals:  Temp (24hrs), Av.5 °F (36.9 °C), Min:97.6 °F (36.4 °C), Max:99.7 °F (37.6 °C)     Blood pressure 160/50, pulse 62, temperature 98 °F (36.7 °C), resp. rate 19, height 4' 8\" (1.422 m), weight 49.9 kg (110 lb 0.2 oz), SpO2 98 %. I&O's:   1901 -  0700  In: 1060 [P.O.:60; I.V.:1000]  Out: -    No intake/output data recorded. Exam:   NAD. abdomen soft, NT     Labs:   Recent Labs     19  0448 12/15/19  2036   WBC 18.3* 22.3*   HGB 14.6 16.1*   HCT 44.0 47.0    370     Recent Labs     198 12/15/19  2036    135*   K 3.0* 3.4*    95*   CO2 31 34*   * 143*   BUN 13 17   CREA 0.69 0.88   CA 8.1* 9.3        Cultures:   : Urine culture pending. Imaging:  CT ABD PELV W CONT 12/15/19  1. Hydronephrosis with 2.6 cm calculus in the right renal pelvis. 2. Multiple smaller calculi in the right kidney.   2. 9 mm calculus in the lower pole of left kidney     Assessment:     - Active Problems: Leukocytosis (12/15/2019)      A-fib ([address] (12/15/2019)      Hydronephrosis of right kidney (12/15/2019)      Bilateral kidney stones (12/15/2019)      HTN (hypertension), benign (12/16/2019)      Hypokalemia due to loss of potassium (12/16/2019)        Plan:     - Continue to follow cultures. ABX per primary team.  - Will arrange outpatient follow up. Will likely need PCNL due to stone burden. Patient was seen and examined with Dr. Rayshawn Reese.     Signed By: Aris Bailey NP - December 16, 2019

## 2019-12-16 NOTE — PROGRESS NOTES
12/16/2019  4:50 PM  Reason for Admission:   AMS, weakness/vomiting                    RRAT Score:    9                  Plan for utilizing home health:  Open with [facility], will need resumption order                         Current Advanced Directive/Advance Care Plan:  POA- Asif De La Rosa [phone]                         Transition of Care Plan:           CM met with pt and pt's Christinalizzeth Sepulveda Completed assessment and discharge planning. Pt lives alone at [employer]. Pt has a caregiver Ms. Bernard Harmon ([phone]) who cares for pt Monday-Saturday from 9am-4pm.  Caregiver assists with most ADLs including transportation. Pt's cousin- Asif De La Rosa is her POA. Pt has Medicare and Medicaid and uses [employer]. Pt has DME-[employer]. Pt is currently open with HH through [facility], pt will need resumption orders for PT/OT/RN. Pt is alone from 4pm-until bedtime and family is concerned about her care during this time as she has had recurrent falls. CM provided information about personal care aids, but family stated pt has limited income. Long term plan is for pt to go to assisted living. Pt's cousin will bring POA papers to be scanned into pt's chart. Pt's caregiver-Nayana will provide transportation. Plan:  1. Resume HH- PT/OT/RN, resumption order needed  2. CM will follow for needs    Care Management Interventions  PCP Verified by CM: Yes(Dr. Miguelangel Castro, no NN)  Mode of Transport at Discharge:  Other (see comment)(Pt's caregiver Bernard Harmon can transport)  Transition of Care Consult (CM Consult): Discharge Planning  Current Support Network: Own Home, Has Personal Caregivers  Confirm Follow Up Transport: Other (see comment)(Caregiver-Nayana transport)  Plan discussed with Pt/Family/Caregiver: Yes  Discharge Location  Discharge Placement: Home with home health    [address] [place] [address]

## 2019-12-16 NOTE — PERIOP NOTES
Spoke with patient and pts POA, Nancy Yoo (cousin) regarding surgical consent. Patient signed and POA agreed to proceed with surgery.

## 2019-12-16 NOTE — H&P
History & Physical      Date of admission: 12/15/2019    Patient name: Adrián Collazo  MRN: [id number]  YOB: 1940  Age: 67 y.o. Primary care provider:  Lakeshia Castro MD     Source of Information: patient's caregiver, ED and electronic medical records                              Chief complaint:   Patient does not provide    History of present illness  Adrián Collazo is a 67 y.o. white female with past medical history of Down's syndrome, arthritis, CKD, kidney stones, chronic hip pain, hypertension, and chronic diarrhea presented to the ED from home with reported altered mental status, weakness, and vomiting. Patient is very hard of hearing with history of Down's syndrome and mental debility per her caregiver's report. Patient is confused and currently she provides no history. Patient's caregiver (who reported is off duty) accompanied patient to the hospital tonight with the aforementioned symptoms, noted today without specific aggravating/ alleviating factors. On arrival in the ED, initial recorded vital signs were BP= 147/72, HR= 82, RR= 18, O2sata= 100% on room air. WBC= 22,300. CT abdomen/ pelvis showed evidence of right hydronephrosis with a 2.6 cm right renal pelvis stone, a 9 mm stone in the lower pole of the left kidney and multiple smaller stones in the right kidney. Per the ED, patient was given Ceftriaxone 1 gram IV x 1 dose. Patient is now seen for admission to the hospitalist service for continued evaluation and treatments. 12 lead EKG showed atrial fibrillation (with no prior documentation of the same compared to prior EKG on 10/19/2015).       Past Medical History:   Diagnosis Date    Arthritis     Chronic kidney disease     kidney stones    Chronic pain     right hip    Hypertension     Ill-defined condition     chronic diarrhea      Past Surgical History:   Procedure Laterality Date    HX APPENDECTOMY      HX ORTHOPAEDIC      bilateral carpal tunnel    HX TONSILLECTOMY       Prior to Admission medications    Medication Sig Start Date End Date Taking? Authorizing Provider   loperamide (IMMODIUM) 2 mg tablet Take 2 mg by mouth four (4) times daily as needed for Diarrhea. Indications: DIARRHEA    Provider, Historical   Hydrochlorothiazide (HYDRODIURIL) 12.5 mg tablet Take 1 tablet by mouth daily. 9/24/14   Caryl Wright NP     Allergies   Allergen Reactions    Pcn [Penicillins] Itching    Sulfa (Sulfonamide Antibiotics) Itching         Social history  Patient resides  x  Independently    x  occasionally has a caregiver           Ambulates          x  Assisted roller walker         Alcohol history   x  None           Smoking history  x  None             Family History   Problem Relation Age of Onset    Cancer Father         Review of systems  Pertinent positives as noted in HPI. All other systems were reviewed and were negative     Physical Examination   Visit Vitals  /71   Pulse 99   Temp 99.7 °F (37.6 °C)   Resp 20   SpO2 95%          O2 Device: Room air    General:  Patient is in no acute respiratory distress. Head:  Normocephalic, without obvious abnormality, atraumatic   Eyes:  Conjunctivae/corneas clear. Pupils 2 mm reactive bilateral   E/N/M/T: Nares normal. Septum midline.  No nasal drainage or sinus tenderness  Tongue midline/ non-edematous  Clear oropharynx   Neck: Normal appearance and movements, symmetrical, trachea midline  No palpable adenopathy  No thyroid enlargement, tenderness or nodules  No carotid bruit   No JVD  Trachea midline   Lungs:   Symmetrical chest expansion and respiratory effort  Clear to auscultation bilaterally   Chest wall:  No tenderness or deformity   Heart:  Regular rate and rhythm   Normal S1 and S2; no murmur, click, rub or gallop   Abdomen:   Soft, generalized tenderness  Voluntary guarding  No guarding or rigidity  Distended  Bowel sounds normal  No masses or hepatosplenomegaly  No hernias present   Back: No costovertebral angle tenderness  No step-off deformity   Extremities: Some contracture chronic deformity of hands  Unable to visualize feet (with shoes and socks present and patient became agitated when I attempted to examine her feet)  No cyanosis or edema     Vascular/  Pulses: 2+ radial/ 1+ DP bilateral pulses   Integument/  Skin: No noted rashes or ulcers  Warm and dry   Musculo-      skeletal: Gait not tested  No calf tenderness   Neuro: GCS 13 (E4 V3 M6). Follows some but not most commands. Moves all extremities x 4. No slurred speech. No facial droop. Sensation grossly intact.      Psych: Alert, oriented x 2 to person and place  Very anxious/ occasionally slightly agitated  Very hard of hearing and this examiner had to speak very loudly for patient to hear           I reviewed the following data:      24 Hour Results:  Recent Results (from the past 24 hour(s))   URINALYSIS W/ REFLEX CULTURE    Collection Time: 12/15/19  8:03 PM   Result Value Ref Range    Color DARK YELLOW      Appearance TURBID (A) CLEAR      Specific gravity 1.023 1.003 - 1.030      pH (UA) 5.0 5.0 - 8.0      Protein 100 (A) NEG mg/dL    Glucose NEGATIVE  NEG mg/dL    Ketone TRACE (A) NEG mg/dL    Blood LARGE (A) NEG      Urobilinogen 1.0 0.2 - 1.0 EU/dL    Nitrites NEGATIVE  NEG      Leukocyte Esterase SMALL (A) NEG      WBC 5-10 0 - 4 /hpf    RBC 0-5 0 - 5 /hpf    Epithelial cells FEW FEW /lpf    Bacteria NEGATIVE  NEG /hpf    UA:UC IF INDICATED URINE CULTURE ORDERED (A) CNI      Amorphous Crystals 1+ (A) NEG   BILIRUBIN, CONFIRM    Collection Time: 12/15/19  8:03 PM   Result Value Ref Range    Bilirubin UA, confirm NEGATIVE  NEG     EKG, 12 LEAD, INITIAL    Collection Time: 12/15/19  8:07 PM   Result Value Ref Range    Ventricular Rate 95 BPM    Atrial Rate 78 BPM    QRS Duration 112 ms    Q-T Interval 410 ms    QTC Calculation (Bezet) 515 ms    Calculated R Axis 75 degrees    Calculated T Axis 102 degrees    Diagnosis       Atrial fibrillation with a competing junctional pacemaker  Inferior infarct , age undetermined  Abnormal ECG  When compared with ECG of 19-OCT-2015 13:07,  Atrial fibrillation has replaced Sinus rhythm  ST more depressed in Anterolateral leads  T wave inversion now evident in Anterolateral leads  QT has lengthened     CBC WITH AUTOMATED DIFF    Collection Time: 12/15/19  8:36 PM   Result Value Ref Range    WBC 22.3 (H) 3.6 - 11.0 K/uL    RBC 5.09 3.80 - 5.20 M/uL    HGB 16.1 (H) 11.5 - 16.0 g/dL    HCT 47.0 35.0 - 47.0 %    MCV 92.3 80.0 - 99.0 FL    MCH 31.6 26.0 - 34.0 PG    MCHC 34.3 30.0 - 36.5 g/dL    RDW 12.3 11.5 - 14.5 %    PLATELET 253 510 - 000 K/uL    MPV 10.8 8.9 - 12.9 FL    NRBC 0.0 0  WBC    ABSOLUTE NRBC 0.00 0.00 - 0.01 K/uL    NEUTROPHILS 83 (H) 32 - 75 %    LYMPHOCYTES 8 (L) 12 - 49 %    MONOCYTES 7 5 - 13 %    EOSINOPHILS 0 0 - 7 %    BASOPHILS 1 0 - 1 %    IMMATURE GRANULOCYTES 1 (H) 0.0 - 0.5 %    ABS. NEUTROPHILS 18.7 (H) 1.8 - 8.0 K/UL    ABS. LYMPHOCYTES 1.8 0.8 - 3.5 K/UL    ABS. MONOCYTES 1.6 (H) 0.0 - 1.0 K/UL    ABS. EOSINOPHILS 0.0 0.0 - 0.4 K/UL    ABS. BASOPHILS 0.1 0.0 - 0.1 K/UL    ABS. IMM. GRANS. 0.1 (H) 0.00 - 0.04 K/UL    DF AUTOMATED     METABOLIC PANEL, COMPREHENSIVE    Collection Time: 12/15/19  8:36 PM   Result Value Ref Range    Sodium 135 (L) 136 - 145 mmol/L    Potassium 3.4 (L) 3.5 - 5.1 mmol/L    Chloride 95 (L) 97 - 108 mmol/L    CO2 34 (H) 21 - 32 mmol/L    Anion gap 6 5 - 15 mmol/L    Glucose 143 (H) 65 - 100 mg/dL    BUN 17 6 - 20 MG/DL    Creatinine 0.88 0.55 - 1.02 MG/DL    BUN/Creatinine ratio 19 12 - 20      GFR est AA >60 >60 ml/min/1.73m2    GFR est non-AA >60 >60 ml/min/1.73m2    Calcium 9.3 8.5 - 10.1 MG/DL    Bilirubin, total 1.2 (H) 0.2 - 1.0 MG/DL    ALT (SGPT) 18 12 - 78 U/L    AST (SGOT) 21 15 - 37 U/L    Alk.  phosphatase 106 45 - 117 U/L    Protein, total 7.7 6.4 - 8.2 g/dL Albumin 3.3 (L) 3.5 - 5.0 g/dL    Globulin 4.4 (H) 2.0 - 4.0 g/dL    A-G Ratio 0.8 (L) 1.1 - 2.2     TROPONIN I    Collection Time: 12/15/19  8:36 PM   Result Value Ref Range    Troponin-I, Qt. <0.05 <0.05 ng/mL   LIPASE    Collection Time: 12/15/19  8:36 PM   Result Value Ref Range    Lipase 227 73 - 393 U/L     Recent Labs     12/15/19  2036   WBC 22.3*   HGB 16.1*   HCT 47.0        Recent Labs     12/15/19  2036   *   K 3.4*   CL 95*   CO2 34*   *   BUN 17   CREA 0.88   CA 9.3   ALB 3.3*   TBILI 1.2*   SGOT 21   ALT 18       Imaging  CT ABD PELV W CONT     INDICATION: abd pain vomiting     COMPARISON: None      CONTRAST: 100 mL of Isovue-370.     TECHNIQUE:   Following the uneventful intravenous administration of contrast, thin axial  images were obtained through the abdomen and pelvis. Coronal and sagittal  reconstructions were generated. Oral contrast was not administered. CT dose  reduction was achieved through use of a standardized protocol tailored for this  examination and automatic exposure control for dose modulation.     FINDINGS:   LUNG BASES: Clear. INCIDENTALLY IMAGED HEART AND MEDIASTINUM: Unremarkable. LIVER: No mass or biliary dilatation. GALLBLADDER: Unremarkable. SPLEEN: No mass. PANCREAS: No mass or ductal dilatation. ADRENALS: 9 mm calculus in the midpole of the left kidney. 25 mm calculus in the  right renal pelvis. 2.6 mm calculus in the lower pole of the right kidney. 6 mm  calculus in the lower pole of the right kidney. Right hydronephrosis. KIDNEYS: No mass, calculus, or hydronephrosis. STOMACH: Significant gastric fluid. SMALL BOWEL: No dilatation or wall thickening. COLON: No dilatation or wall thickening. APPENDIX: Unremarkable. PERITONEUM: Fluid in the right pelvis. RETROPERITONEUM: No lymphadenopathy or aortic aneurysm. REPRODUCTIVE ORGANS: Fluid in the uterus. URINARY BLADDER: No mass or calculus.   BONES: Degenerative changes of the spine.  ADDITIONAL COMMENTS: N/A     IMPRESSION:     1. Hydronephrosis with 2.6 cm calculus in the right renal pelvis. 2. Multiple smaller calculi in the right kidney. 2. 9 mm calculus in the lower pole of left kidney      Assessment and Plan     1. Hydronephrosis - with 2.6 cm calculus in the right renal pelvis. - admit to telemetry       - keep NPO with plan for surgery       - urologist consulted    2. Afib - possibly new onset; rate currently controlled       - place on telemetry monitoring       - consult cardiologist in a.m. 3.  Leukocytosis. UA not definite for UTI       - per ED patient already started on Ceftriaxone 1 gram IV; may continue q 24 hours       - check urine culture       - repeat CBC in a.m.       4.  Bilateral kidney stones - with multiple smaller calculi in the right kidney and 9 mm calculus in the lower pole of left kidney       - plan as above    5. Generalized weakness/ physical debility. H/o Down's syndrome. - patient needs medical power of ; lives alone and concern for patient's ability to take       care of herself. Caregiver who reportedly was not being paid for       - consult case management    6. Acute hypokalemia       - provide potassium replacement and repeat potassium level in a.m.    7.  Generalized abdominal pain       - provide pain management while inpatient; supportive cares    8. AMS (altered mental status). Details regarding the same are very limited       - ordered stat CT head to rule out an acute intracranial process    9. Nausea/ vomiting       - Zofran 4 mg IV q 6 hours prn    10.   VTE prophylaxis       - SCDs to BLEs       Signed by: Jerad Jiménez MD    December 15, 2019 at 10:42 PM

## 2019-12-16 NOTE — ED PROVIDER NOTES
HPI   68 yo F presents with concern for UTI. C/o vomiting, weakness. Vomiting onset Thursday. Normally walks with walker and unable to today. Not tolerating PO today. Has been confused. No falls or injury. No fever. C/o abdominal pain.   Past Medical History:   Diagnosis Date    Arthritis     Chronic kidney disease     kidney stones    Chronic pain     right hip    Hypertension     Ill-defined condition     chronic diarrhea       Past Surgical History:   Procedure Laterality Date    HX APPENDECTOMY      HX ORTHOPAEDIC      bilateral carpal tunnel    HX TONSILLECTOMY           Family History:   Problem Relation Age of Onset    Cancer Father        Social History     Socioeconomic History    Marital status:      Spouse name: Not on file    Number of children: Not on file    Years of education: Not on file    Highest education level: Not on file   Occupational History    Not on file   Social Needs    Financial resource strain: Not on file    Food insecurity:     Worry: Not on file     Inability: Not on file    Transportation needs:     Medical: Not on file     Non-medical: Not on file   Tobacco Use    Smoking status: Never Smoker    Smokeless tobacco: Never Used   Substance and Sexual Activity    Alcohol use: No    Drug use: No    Sexual activity: Not on file   Lifestyle    Physical activity:     Days per week: Not on file     Minutes per session: Not on file    Stress: Not on file   Relationships    Social connections:     Talks on phone: Not on file     Gets together: Not on file     Attends Yarsanism service: Not on file     Active member of club or organization: Not on file     Attends meetings of clubs or organizations: Not on file     Relationship status: Not on file    Intimate partner violence:     Fear of current or ex partner: Not on file     Emotionally abused: Not on file     Physically abused: Not on file     Forced sexual activity: Not on file   Other Topics Concern    Not on file   Social History Narrative    Not on file         ALLERGIES: Pcn [penicillins] and Sulfa (sulfonamide antibiotics)    Review of Systems   Constitutional: Negative for chills and fever. Respiratory: Negative for shortness of breath. Cardiovascular: Negative for chest pain. Gastrointestinal: Positive for abdominal pain, nausea and vomiting. Negative for diarrhea. Genitourinary: Negative for dysuria. All other systems reviewed and are negative.       Vitals:    12/15/19 1845   BP: 147/72   Pulse: 82   Resp: 18   Temp: 99.5 °F (37.5 °C)   SpO2: 100%            Physical Exam   Physical Examination: General appearance - alert, well appearing, and in no distress, oriented to person, place, and time and normal appearing weight  Eyes - pupils equal and reactive, extraocular eye movements intact  Neck - supple, no significant adenopathy  Chest - clear to auscultation, no wheezes, rales or rhonchi, symmetric air entry  Heart - irregular rhythm, normal S1, S2, no murmurs, rubs, clicks or gallops  Abdomen - soft, diffuse tenderness to palpation, no rebound/guarding/peritoneal signs, nondistended, no masses or organomegaly  Back exam - full range of motion, no tenderness, palpable spasm or pain on motion  Neurological - alert, oriented, normal speech, no focal findings or movement disorder noted  Musculoskeletal - no joint tenderness, deformity or swelling  Extremities - peripheral pulses normal, no pedal edema, no clubbing or cyanosis  Skin - normal coloration and turgor, no rashes, no suspicious skin lesions noted  MDM  Number of Diagnoses or Management Options     Amount and/or Complexity of Data Reviewed  Clinical lab tests: ordered and reviewed  Tests in the radiology section of CPT®: ordered and reviewed  Decide to obtain previous medical records or to obtain history from someone other than the patient: yes  Obtain history from someone other than the patient: yes (EMS, caregiver)  Review and summarize past medical records: yes  Independent visualization of images, tracings, or specimens: yes    Patient Progress  Patient progress: stable         Procedures  ED EKG interpretation:  Rhythm: ?afib; and irregular. Rate (approx.): 102; Axis: normal; P wave: normal; QRS interval: normal ; ST/T wave: non-specific changes;  EKG documented by Linda Hayes MD    8:28 PM  Pt signed out to Dr. Kaylyn Calvillo pending labs and CT.

## 2019-12-16 NOTE — CONSULTS
Urology Consult    Subjective:     Date of Consultation:  December 15, 2019    Referring Physician: Harpal Mercado    Reason for Consultation:  Right ureteral stone with hydro    Patient Name: Karina Santana  MRN: [id number]    History of Present Illness:   Patient is a 67 y.o.  female who is being seen for ureteral stone with infeciton. She was admitted to the [facility] for [facility]-fib [facility]) [I48.91]  Leukocytosis [D72.829]  Hydronephrosis of right kidney [N13.30]  Bilateral kidney stones [N20.0]. Patient with several days of back pain. CT in ER showed a large, >2cm right renal pelvic stone with hydronephrosis. Leukocytosis of 22 and low grade fever of 99.5. Cr wnl. Past Medical History:   Diagnosis Date    Arthritis     Chronic kidney disease     kidney stones    Chronic pain     right hip    Hypertension     Ill-defined condition     chronic diarrhea      Past Surgical History:   Procedure Laterality Date    HX APPENDECTOMY      HX ORTHOPAEDIC      bilateral carpal tunnel    HX TONSILLECTOMY        Family History   Problem Relation Age of Onset    Cancer Father       Social History     Tobacco Use    Smoking status: Never Smoker    Smokeless tobacco: Never Used   Substance Use Topics    Alcohol use: No     Allergies   Allergen Reactions    Pcn [Penicillins] Itching    Sulfa (Sulfonamide Antibiotics) Itching      Prior to Admission medications    Medication Sig Start Date End Date Taking? Authorizing Provider   loperamide (IMMODIUM) 2 mg tablet Take 2 mg by mouth four (4) times daily as needed for Diarrhea. Indications: DIARRHEA    Provider, Historical   Hydrochlorothiazide (HYDRODIURIL) 12.5 mg tablet Take 1 tablet by mouth daily. 9/24/14   Monalisa Martinez NP         Review of Systems:  Pertinent items are noted in HPI.     Objective:     Data Review (Labs):    Recent Labs     12/15/19  2036   WBC 22.3*   HGB 16.1*   MCV 92.3      *   K 3.4*   CREA 0.88   BUN 17   ALB 3.3*   TBILI 1.2*   SGOT 21   ALT 18      LPSE 227   IPVITALS(8:)Temp (24hrs), Av.6 °F (37.6 °C), Min:99.5 °F (37.5 °C), Max:99.7 °F (37.6 °C)    Temp (24hrs), Av.6 °F (37.6 °C), Min:99.5 °F (37.5 °C), Max:99.7 °F (37.6 °C)  [id number] intake/output data recorded. Physical Exam:            General:    alert, cooperative, no distress, appears stated age                     Skin:  Normal.   Resp Normal effort             Abdomen[de-identified]  soft, NT, ND             Genitalia:  defer exam          Extremities:  negative       Assessment:     Active Problems:    Leukocytosis (12/15/2019)      A-fib ([address] (12/15/2019)      Hydronephrosis of right kidney (12/15/2019)      Bilateral kidney stones (12/15/2019)        Plan:     - infected stone with hydro: to OR for cysto with RPG, stent placement. Risks reviewed with patient and POA including bleeding, infection, damage to surrounding structures and need for additional procedures. We discussed that if a stent is unable to be placed, a PCN tube will be needed. PAtient signed and POA agreed to proceed. Patient is admitted to hospitalist already and is on abx. Cultures pending.     Signed By: Jodie Squires MD                         December 15, 2019

## 2019-12-16 NOTE — PROGRESS NOTES
Interdisciplinary team rounds were held 12/16/2019 with the following team members:Care Management, Nursing, Nutrition, Pharmacy, Physical Therapy and Physician .     Plan of care monitor overnight

## 2019-12-16 NOTE — ANESTHESIA POSTPROCEDURE EVALUATION
Procedure(s):  CYSTOSCOPY, RIGHT URETERAL STENT PLACEMENT. general    Anesthesia Post Evaluation      Multimodal analgesia: multimodal analgesia not used between 6 hours prior to anesthesia start to PACU discharge  Patient location during evaluation: PACU  Patient participation: complete - patient participated  Level of consciousness: awake  Pain management: adequate  Airway patency: patent  Anesthetic complications: no  Cardiovascular status: acceptable  Respiratory status: acceptable  Hydration status: acceptable  Post anesthesia nausea and vomiting:  controlled      Vitals Value Taken Time   /79 12/16/2019  1:15 AM   Temp 36.7 °C (98 °F) 12/16/2019 12:52 AM   Pulse 84 12/16/2019  1:21 AM   Resp 19 12/16/2019  1:21 AM   SpO2 100 % 12/16/2019  1:21 AM   Vitals shown include unvalidated device data.

## 2019-12-16 NOTE — PROGRESS NOTES
TRANSFER - IN REPORT:    Verbal report received from Ana(name) on Romana Goldsmith  being received from PACU(unit) for routine progression of care      Report consisted of patients Situation, Background, Assessment and   Recommendations(SBAR). Information from the following report(s) SBAR, Kardex, ED Summary, Intake/Output, MAR and Recent Results was reviewed with the receiving nurse. Opportunity for questions and clarification was provided. Assessment completed upon patients arrival to unit and care assumed.

## 2019-12-16 NOTE — BRIEF OP NOTE
BRIEF OPERATIVE NOTE    Date of Procedure: 12/15/2019   Preoperative Diagnosis: hydronephrosis  Postoperative Diagnosis: hydronephrosis    Procedure(s):  CYSTOSCOPY, RIGHT URETERAL STENT PLACEMENT  Surgeon(s) and Role:     * Sheyla Choe MD - Primary         Surgical Assistant: none    Surgical Staff:  Circ-1: Stephen Prather  Scrub Tech-1: Misael Gomez  Surg Asst-1: Yvonne Earthly, Mohsen  Event Time In Time Out   Incision Start 12/16/2019 0023    Incision Close 12/16/2019 0036      Anesthesia: General   Estimated Blood Loss: none  Specimens: * No specimens in log *   Findings: 1. Stone not easily seen on x-ray. Contrast still retained in right kidney. 2. Right ureteral stent in good position at completion of case   Complications: none  Implants:   Implant Name Type Inv.  Item Serial No.  Lot No. LRB No. Used Action   STENT URET FLX SFT [id number] -- CONTOUR PERCUFLEX - SNA  STENT URET FLX SFT [id number] -- CONTOUR PERCUFLEX NA Mintera [facility] [id number] Right 1 Implanted

## 2019-12-16 NOTE — OP NOTES
[facility]  OPERATIVE REPORT    Name:  Jobie Gaucher  MR#:  [id number]  :  1940  ACCOUNT #:  [de-identified]  DATE OF SERVICE:  2019    SERVICE:  Urology. PREPROCEDURE DIAGNOSIS:  Right renal pelvic stone with hydronephrosis. POSTPROCEDURE DIAGNOSIS:  Right renal pelvic stone with hydronephrosis. PROCEDURE PERFORMED:  Cystourethroscopy with right ureteral stent placement. SURGEON:  Deven Ardon MD    ASSISTANT:  none    ANESTHESIA:  General.    TUBES AND DRAINS:  Right 6-North Korean x 22-cm stent with short string. COMPLICATIONS:  None. SPECIMENS REMOVED:  None. IMPLANTS:  None. ESTIMATED BLOOD LOSS:  None. PROCEDURE FINDINGS:  1.  Stone not easily seen on fluoroscopy during stent placement. Contrast was noted retained within the right kidney from prior CT scan. 2.  Right ureteral stent in good position at the completion of the case. INDICATIONS FOR PROCEDURE:  The patient is a 76-year-old woman who presented to the ER with several-day history of right back pain. A CT scan revealed a large greater than 2-cm right renal pelvic stone with associated hydronephrosis. She had leukocytosis of 22 as well as a low-grade fever of 99.5 prompting urgent stent placement. Risks were reviewed both with the patient as well as her power of  prior to proceeding. PROCEDURE IN DETAIL:  After proper consent was obtained and the history and physical reviewed, the patient was brought back to the operating room and placed in supine position. After successful induction of general anesthesia, she was placed in lithotomy position and prepped and draped in the usual sterile fashion for rigid cystoscopy. A timeout was performed and antibiotics confirmed at that time. Using a 21-[place] Korean rigid cystoscope with a 30-degree lens, the scope was inserted in the urethra and advanced towards the bladder under direct visualization.   Brief inspection of the bladder mucosa was unremarkable for calculi or lesions. The right ureteral orifice was cannulated with a 0.035 sensor guidewire which was able to be advanced up past the stone into the upper pole of the kidney successfully. Because of the retained contrast, the anatomy of the right kidney was quite clear. There was some cloudy discharge from the kidney after initial wire placement. Next, a 6-[place]oth[place] x 22-cm stent was placed over this wire and advanced up into the kidney successfully. The proximal curl was seen in the upper pole of the kidney and the distal curl was seen in the bladder per cystoscopy. The bladder was then drained and the cystoscope removed. The patient was taken out of general anesthesia without complication and brought to the PACU in stable condition. DISPOSITION:  The patient will be transferred back to the floor to continue antibiotics while we await for culture results. It appears she will need definitive treatment, likely with PCNL based on the significant stone burden on the right.       Yuniel Keen MD      [id number]  D:  12/16/2019 0:47  T:  12/16/2019 7:02  JOB #:  [id number]

## 2019-12-17 LAB
ANION GAP SERPL CALC-SCNC: 5 MMOL/L (ref 5–15)
BACTERIA SPEC CULT: NORMAL
BASOPHILS # BLD: 0.1 K/UL (ref 0–0.1)
BASOPHILS NFR BLD: 0 % (ref 0–1)
BUN SERPL-MCNC: 8 MG/DL (ref 6–20)
BUN/CREAT SERPL: 15 (ref 12–20)
CALCIUM SERPL-MCNC: 7.9 MG/DL (ref 8.5–10.1)
CC UR VC: NORMAL
CHLORIDE SERPL-SCNC: 110 MMOL/L (ref 97–108)
CO2 SERPL-SCNC: 26 MMOL/L (ref 21–32)
CREAT SERPL-MCNC: 0.55 MG/DL (ref 0.55–1.02)
DIFFERENTIAL METHOD BLD: ABNORMAL
EOSINOPHIL # BLD: 0 K/UL (ref 0–0.4)
EOSINOPHIL NFR BLD: 0 % (ref 0–7)
ERYTHROCYTE [DISTWIDTH] IN BLOOD BY AUTOMATED COUNT: 12.6 % (ref 11.5–14.5)
GLUCOSE SERPL-MCNC: 118 MG/DL (ref 65–100)
HCT VFR BLD AUTO: 40.8 % (ref 35–47)
HGB BLD-MCNC: 13.5 G/DL (ref 11.5–16)
IMM GRANULOCYTES # BLD AUTO: 0.1 K/UL (ref 0–0.04)
IMM GRANULOCYTES NFR BLD AUTO: 1 % (ref 0–0.5)
LYMPHOCYTES # BLD: 1.4 K/UL (ref 0.8–3.5)
LYMPHOCYTES NFR BLD: 8 % (ref 12–49)
MCH RBC QN AUTO: 31.4 PG (ref 26–34)
MCHC RBC AUTO-ENTMCNC: 33.1 G/DL (ref 30–36.5)
MCV RBC AUTO: 94.9 FL (ref 80–99)
MONOCYTES # BLD: 1.3 K/UL (ref 0–1)
MONOCYTES NFR BLD: 7 % (ref 5–13)
NEUTS SEG # BLD: 14.7 K/UL (ref 1.8–8)
NEUTS SEG NFR BLD: 84 % (ref 32–75)
NRBC # BLD: 0 K/UL (ref 0–0.01)
NRBC BLD-RTO: 0 PER 100 WBC
PLATELET # BLD AUTO: 354 K/UL (ref 150–400)
PMV BLD AUTO: 10.6 FL (ref 8.9–12.9)
POTASSIUM SERPL-SCNC: 3.8 MMOL/L (ref 3.5–5.1)
RBC # BLD AUTO: 4.3 M/UL (ref 3.8–5.2)
SERVICE CMNT-IMP: NORMAL
SODIUM SERPL-SCNC: 141 MMOL/L (ref 136–145)
WBC # BLD AUTO: 17.5 K/UL (ref 3.6–11)

## 2019-12-17 PROCEDURE — 77030038269 HC DRN EXT URIN PURWCK BARD -A

## 2019-12-17 PROCEDURE — 85025 COMPLETE CBC W/AUTO DIFF WBC: CPT

## 2019-12-17 PROCEDURE — 36415 COLL VENOUS BLD VENIPUNCTURE: CPT

## 2019-12-17 PROCEDURE — 80048 BASIC METABOLIC PNL TOTAL CA: CPT

## 2019-12-17 PROCEDURE — 65660000000 HC RM CCU STEPDOWN

## 2019-12-17 PROCEDURE — 74011250636 HC RX REV CODE- 250/636: Performed by: FAMILY MEDICINE

## 2019-12-17 RX ORDER — AMLODIPINE BESYLATE 5 MG/1
5 TABLET ORAL DAILY
Qty: 30 TAB | Refills: 0 | Status: SHIPPED | OUTPATIENT
Start: 2019-12-17 | End: 2020-01-06

## 2019-12-17 RX ADMIN — Medication 10 ML: at 21:07

## 2019-12-17 RX ADMIN — SODIUM CHLORIDE 125 ML/HR: 900 INJECTION, SOLUTION INTRAVENOUS at 08:35

## 2019-12-17 RX ADMIN — Medication 10 ML: at 06:29

## 2019-12-17 RX ADMIN — ONDANSETRON 4 MG: 2 INJECTION INTRAMUSCULAR; INTRAVENOUS at 21:07

## 2019-12-17 RX ADMIN — SODIUM CHLORIDE 125 ML/HR: 900 INJECTION, SOLUTION INTRAVENOUS at 01:13

## 2019-12-17 NOTE — PROGRESS NOTES
Progress Note    Patient: Lurdes Marroquin MRN: [id number]  SSN: [id number]    YOB: 1940  Age: 67 y.o. Sex: female          ADMITTED:  12/15/2019 to Girma Rees MD  for [facility]) [I48.91]  Leukocytosis [L98.799]  Hydronephrosis of right kidney [N13.30]  Bilateral kidney stones [N20.0]           Lurdes Marroquin was admitted for [facility]) [I48.91]  Leukocytosis [D72.829]  Hydronephrosis of right kidney [N13.30]  Bilateral kidney stones [N20.0]. HPI:   Patient with several days of back pain. CT in ER showed a large, >2cm right renal pelvic stone with hydronephrosis. Leukocytosis of 22 and low grade fever of 99.5. Cr wnl. She is s/p cystoscopy, right ureteral stent placement 19. Interval History:   Patient confused and unable to provide history, spoke with nursing. Having some right abdominal discomfort. Tolerating diet. WBC improved, 17.5 (18.3). Urine cultures pending, UA did not look infected. VSS. Vitals:  Temp (24hrs), Av.1 °F (36.7 °C), Min:98 °F (36.7 °C), Max:98.4 °F (36.9 °C)     Blood pressure 149/62, pulse 74, temperature 98.1 °F (36.7 °C), resp. rate 20, height 4' 8\" (1.422 m), weight 50 kg (110 lb 3.7 oz), SpO2 98 %. I&O's:  12/15 1901 -  0700  In: 1300 [P.O.:300; I.V.:1000]  Out: -    No intake/output data recorded. Exam:   NAD. abdomen soft, NT     Labs:   Recent Labs     19  0329 19  0448 12/15/19  2036   WBC 17.5* 18.3* 22.3*   HGB 13.5 14.6 16.1*   HCT 40.8 44.0 47.0    355 370     Recent Labs     19  0329 19  0448 12/15/19  2036    136 135*   K 3.8 3.0* 3.4*   * 100 95*   CO2 26 31 34*   * 130* 143*   BUN 8 13 17   CREA 0.55 0.69 0.88   CA 7.9* 8.1* 9.3        Cultures:   : Urine culture pending. Imaging:  CT ABD PELV W CONT 12/15/19  1. Hydronephrosis with 2.6 cm calculus in the right renal pelvis.   2. Multiple smaller calculi in the right kidney. 2. 9 mm calculus in the lower pole of left kidney     Assessment:     - Active Problems:    Leukocytosis (12/15/2019)      Hydronephrosis of right kidney (12/15/2019)      Bilateral kidney stones (12/15/2019)      HTN (hypertension), benign (12/16/2019)      Hypokalemia due to loss of potassium (12/16/2019)        Plan:     - Continue to follow cultures. ABX per primary team.  - Follow up scheduled 12/26 with Dr. Noni Cobian. Will likely need PCNL due to stone burden.        Signed By: Jessica Iqbal NP - December 17, 2019

## 2019-12-17 NOTE — PROGRESS NOTES
Bedside shift change report given to Laurel (oncoming nurse) by Laverne Guerrero (offgoing nurse). Report included the following information SBAR, Kardex, Intake/Output and Recent Results. 1310- called and lm for POA, Krupa Alcocer, to call us back regarding pt discharge. 1430- LM for caregiver, Meagan Muller, at [phone] to call  Back regarding pt discharge. Bedside shift change report given to Laverne Guerrero (oncoming nurse) by Nithya Pittman (offgoing nurse). Report included the following information SBAR, Kardex, Intake/Output and Recent Results.

## 2019-12-17 NOTE — PROGRESS NOTES
CMS Note  12/17/2019    Patient received a copy of their 1st IM letter. Patient was not able to obtain the letter. Patient was given a copy for their record.   Bernadette Da CMS

## 2019-12-17 NOTE — DISCHARGE SUMMARY
[facility] [place] [facility]  [address]  Tel: [phone]    Physician Discharge Summary    Patient ID:    Dustin Ray  Age:              67 y.o.    : 1940  MRN:             [id number]     PCP: Boom Rowe MD     Admit date: 12/15/2019    Discharge date: 2019    Principal admission Diagnosis:   A-fib ([address] [I48.91]  Leukocytosis [D72.829]  Hydronephrosis of right kidney [N13.30]  Bilateral kidney stones [N20.0]    Discharge Diagnoses:    Hydronephrosis of right kidney (12/15/2019)    Bilateral kidney stones (12/15/2019)    HTN (hypertension), benign (2019)    Hypokalemia due to loss of potassium (2019)    Leukocytosis (12/15/2019)    Consults: Urology    Hospital Course:     Ms. Karen Paez is a 67 y.o. admitted to [facility] and treated for the following:    Hydronephrosis of right kidney (12/15/2019) / Bilateral kidney stones (12/15/2019): seen by Urology and she is sp cystoscopy and right ureteral stent placement. Blood and urine cultures were neg. She will be discharged in stable condition to follow up with Urology.      HTN (hypertension), benign (2019): DC home HCTZ. Started on low dose Amlodipine     Hypokalemia due to loss of potassium (2019): due to diuretic use. Repleted.  DC diuretic at discharge     Discharge Exam:    Visit Vitals  /62 (BP 1 Location: Right arm, BP Patient Position: At rest)   Pulse 74   Temp 98.1 °F (36.7 °C)   Resp 20   Ht 4' 8\" (1.422 m)   Wt 50 kg (110 lb 3.7 oz)   SpO2 98%   BMI 24.71 kg/m²        General: well looking and stable patient in no acute distress  Pulm: clear breath sounds without wheezes  Card: no murmurs, normal S1, S2 without thrills, bruits   Abd:    soft, non-tender, normoactive bowel sounds  Skin: no rashes or ulcers, skin turgor is good  Neuro: awake, alert and has a non focal     Activity: Activity as tolerated    Diet: Regular Diet    Current Discharge Medication List      START taking these medications    Details   amLODIPine (NORVASC) 5 mg tablet Take 1 Tab by mouth daily for 30 days. Qty: 30 Tab, Refills: 0         CONTINUE these medications which have NOT CHANGED    Details   loperamide (IMMODIUM) 2 mg tablet Take 2 mg by mouth four (4) times daily as needed for Diarrhea. Indications: DIARRHEA         STOP taking these medications       Hydrochlorothiazide (HYDRODIURIL) 12.5 mg tablet Comments:   Reason for Stopping: Follow-up Information     Follow up With Specialties Details Why Contact Info    Darnell Fox MD Urology On 12/26/2019 Urology @ 8:30am. Please arrive 20 minutes early [facility]  [phone]      Quincy Castro MD [facility] Call For follow up and review as needed [address]  [phone]            Follow-up tests or labs: None    Discharge Condition: Stable    Disposition: home    Time taken to arrange discharge:  35 minutes.     Signed:  Meka Phillips MD     [facility]  12/17/2019   11:31 AM

## 2019-12-17 NOTE — DISCHARGE INSTRUCTIONS
HOSPITALIST DISCHARGE INSTRUCTIONS    NAME: Des Terry   :  1940   MRN:  [id number]     Date:     2019    ADMIT DATE: 12/15/2019     DISCHARGE DATE: 2019     PRINCIPAL ADMITTING DIAGNOSIS:  A-fib ([address] [I48.91]  Leukocytosis [D72.829]  Hydronephrosis of right kidney [N13.30]  Bilateral kidney stones [N20.0]    DISCHARGE DIAGNOSES:  Active Problems:    Hydronephrosis of right kidney (12/15/2019)    Bilateral kidney stones (12/15/2019)    HTN (hypertension), benign (2019)    Hypokalemia due to loss of potassium (2019)    MEDICATIONS:    · It is important that medications are taken exactly as they are prescribed on the discharge medication instructions and keep them your  in the bottles provided by the pharmacist.   · Keep a list of the medication names, dosages, and times to be taken at all times. · Do not take other medications without consulting your doctor. Recommended diet:  Regular Diet    Recommended activity: Activity as tolerated    Post discharge care:    Notify follow up health care provider or return to the emergency department if you cannot get hold of your doctor if you feel worse or experience symptoms similar to those that brought you to hospital    Follow-up Information     Follow up With Specialties Details Why Contact Info    Darnell Fox MD Urology On 2019 Urology @ 8:30am. Please arrive 20 minutes early [facility]  [phone]      Quincy Castro MD Family Practice Call For follow up and review as needed [address]  [phone]            Information obtained by :  I understand that if any problems occur once I am at home I am to contact my physician and I understand and acknowledge receipt of the instructions indicated above.                                                                                                                                            Physician's or R.N.'s Signature                                                                  Date/Time                                                                                                                                              Patient or Representative Signature                                                          Date/Time

## 2019-12-17 NOTE — PROGRESS NOTES
1930: Bedside shift change report given to Linden West RN (oncoming nurse) by Tatyana Solo RN (offgoing nurse). Report included the following information SBAR, Kardex, MAR, and Accordion. 0730: Bedside and Verbal shift change report given to Red Tineo RN (oncoming nurse) by Linden West RN (offgoing nurse). Report included the following information SBAR, Kardex, MAR and Accordion. Problem: Falls - Risk of  Goal: *Absence of Falls  Description  Document Ta Peña Fall Risk and appropriate interventions in the flowsheet.   Outcome: Progressing Towards Goal  Note: Fall Risk Interventions:  Mobility Interventions: Bed/chair exit alarm, Communicate number of staff needed for ambulation/transfer, Patient to call before getting OOB  Mentation Interventions: Adequate sleep, hydration, pain control, Bed/chair exit alarm, Door open when patient unattended, Reorient patient, More frequent rounding, Room close to nurse's station     Elimination Interventions: Bed/chair exit alarm, Call light in reach, Patient to call for help with toileting needs, Toileting schedule/hourly rounds  History of Falls Interventions: Bed/chair exit alarm, Door open when patient unattended, Investigate reason for fall, Room close to nurse's station

## 2019-12-17 NOTE — ROUTINE PROCESS
Bedside shift change report given to Linden West RN (oncoming nurse) by Renata Mckeon (offgoing nurse). Report included the following information SBAR, Kardex, Intake/Output, MAR, Accordion and Recent Results.

## 2019-12-17 NOTE — PROGRESS NOTES
12/17/2019   2:25 PM  Viral Sherman has accepted for [facility] services. No further d/c needs noted for pt at this time. 1:12 PM  CM consult noted for resumption of HH PT/OT/RN. Sent referral vial [employer] to [facility]. Pt is set to discharge this date, pt's caregiver, Doroteo Grimes will provide transport.      [address] [place] [address]

## 2019-12-18 ENCOUNTER — HOSPITAL ENCOUNTER (INPATIENT)
Age: 79
LOS: 4 days | Discharge: NURSING, REHAB OR LONG-TERM CARE | DRG: 948 | End: 2020-01-06
Attending: EMERGENCY MEDICINE | Admitting: INTERNAL MEDICINE
Payer: MEDICARE

## 2019-12-18 VITALS
SYSTOLIC BLOOD PRESSURE: 165 MMHG | HEART RATE: 59 BPM | WEIGHT: 110.23 LBS | HEIGHT: 56 IN | BODY MASS INDEX: 24.8 KG/M2 | RESPIRATION RATE: 18 BRPM | OXYGEN SATURATION: 99 % | DIASTOLIC BLOOD PRESSURE: 63 MMHG | TEMPERATURE: 98.2 F

## 2019-12-18 DIAGNOSIS — R53.1 GENERALIZED WEAKNESS: Primary | ICD-10-CM

## 2019-12-18 DIAGNOSIS — D72.829 LEUKOCYTOSIS, UNSPECIFIED TYPE: ICD-10-CM
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
## 2019-12-18 LAB
ANION GAP SERPL CALC-SCNC: 4 MMOL/L (ref 5–15)
ATRIAL RATE: 57 BPM
ATRIAL RATE: 89 BPM
BASOPHILS # BLD: 0.1 K/UL (ref 0–0.1)
BASOPHILS # BLD: 0.1 K/UL (ref 0–0.1)
BASOPHILS NFR BLD: 0 % (ref 0–1)
BASOPHILS NFR BLD: 0 % (ref 0–1)
BUN SERPL-MCNC: 7 MG/DL (ref 6–20)
BUN/CREAT SERPL: 17 (ref 12–20)
CALCIUM SERPL-MCNC: 8 MG/DL (ref 8.5–10.1)
CALCULATED P AXIS, ECG09: 69 DEGREES
CALCULATED P AXIS, ECG09: 79 DEGREES
CALCULATED R AXIS, ECG10: 60 DEGREES
CALCULATED R AXIS, ECG10: 64 DEGREES
CALCULATED T AXIS, ECG11: 40 DEGREES
CALCULATED T AXIS, ECG11: 45 DEGREES
CHLORIDE SERPL-SCNC: 110 MMOL/L (ref 97–108)
CO2 SERPL-SCNC: 26 MMOL/L (ref 21–32)
COMMENT, HOLDF: NORMAL
CREAT SERPL-MCNC: 0.42 MG/DL (ref 0.55–1.02)
DIAGNOSIS, 93000: NORMAL
DIAGNOSIS, 93000: NORMAL
DIFFERENTIAL METHOD BLD: ABNORMAL
DIFFERENTIAL METHOD BLD: ABNORMAL
EOSINOPHIL # BLD: 0 K/UL (ref 0–0.4)
EOSINOPHIL # BLD: 0 K/UL (ref 0–0.4)
EOSINOPHIL NFR BLD: 0 % (ref 0–7)
EOSINOPHIL NFR BLD: 0 % (ref 0–7)
ERYTHROCYTE [DISTWIDTH] IN BLOOD BY AUTOMATED COUNT: 12.4 % (ref 11.5–14.5)
ERYTHROCYTE [DISTWIDTH] IN BLOOD BY AUTOMATED COUNT: 12.5 % (ref 11.5–14.5)
GLUCOSE SERPL-MCNC: 115 MG/DL (ref 65–100)
HCT VFR BLD AUTO: 43.8 % (ref 35–47)
HCT VFR BLD AUTO: 44.4 % (ref 35–47)
HGB BLD-MCNC: 14.6 G/DL (ref 11.5–16)
HGB BLD-MCNC: 14.9 G/DL (ref 11.5–16)
IMM GRANULOCYTES # BLD AUTO: 0.1 K/UL (ref 0–0.04)
IMM GRANULOCYTES # BLD AUTO: 0.1 K/UL (ref 0–0.04)
IMM GRANULOCYTES NFR BLD AUTO: 1 % (ref 0–0.5)
IMM GRANULOCYTES NFR BLD AUTO: 1 % (ref 0–0.5)
LYMPHOCYTES # BLD: 1.2 K/UL (ref 0.8–3.5)
LYMPHOCYTES # BLD: 1.3 K/UL (ref 0.8–3.5)
LYMPHOCYTES NFR BLD: 6 % (ref 12–49)
LYMPHOCYTES NFR BLD: 9 % (ref 12–49)
MAGNESIUM SERPL-MCNC: 2 MG/DL (ref 1.6–2.4)
MCH RBC QN AUTO: 31.3 PG (ref 26–34)
MCH RBC QN AUTO: 31.7 PG (ref 26–34)
MCHC RBC AUTO-ENTMCNC: 33.3 G/DL (ref 30–36.5)
MCHC RBC AUTO-ENTMCNC: 33.6 G/DL (ref 30–36.5)
MCV RBC AUTO: 93.3 FL (ref 80–99)
MCV RBC AUTO: 95 FL (ref 80–99)
MONOCYTES # BLD: 0.9 K/UL (ref 0–1)
MONOCYTES # BLD: 1.2 K/UL (ref 0–1)
MONOCYTES NFR BLD: 6 % (ref 5–13)
MONOCYTES NFR BLD: 7 % (ref 5–13)
NEUTS SEG # BLD: 12 K/UL (ref 1.8–8)
NEUTS SEG # BLD: 17.4 K/UL (ref 1.8–8)
NEUTS SEG NFR BLD: 83 % (ref 32–75)
NEUTS SEG NFR BLD: 87 % (ref 32–75)
NRBC # BLD: 0 K/UL (ref 0–0.01)
NRBC # BLD: 0 K/UL (ref 0–0.01)
NRBC BLD-RTO: 0 PER 100 WBC
NRBC BLD-RTO: 0 PER 100 WBC
P-R INTERVAL, ECG05: 106 MS
P-R INTERVAL, ECG05: 136 MS
PLATELET # BLD AUTO: 368 K/UL (ref 150–400)
PLATELET # BLD AUTO: 383 K/UL (ref 150–400)
PMV BLD AUTO: 10.2 FL (ref 8.9–12.9)
PMV BLD AUTO: 10.6 FL (ref 8.9–12.9)
POTASSIUM SERPL-SCNC: 3.7 MMOL/L (ref 3.5–5.1)
Q-T INTERVAL, ECG07: 384 MS
Q-T INTERVAL, ECG07: 406 MS
QRS DURATION, ECG06: 90 MS
QRS DURATION, ECG06: 98 MS
QTC CALCULATION (BEZET), ECG08: 395 MS
QTC CALCULATION (BEZET), ECG08: 467 MS
RBC # BLD AUTO: 4.61 M/UL (ref 3.8–5.2)
RBC # BLD AUTO: 4.76 M/UL (ref 3.8–5.2)
SAMPLES BEING HELD,HOLD: NORMAL
SODIUM SERPL-SCNC: 140 MMOL/L (ref 136–145)
VENTRICULAR RATE, ECG03: 57 BPM
VENTRICULAR RATE, ECG03: 89 BPM
WBC # BLD AUTO: 14.4 K/UL (ref 3.6–11)
WBC # BLD AUTO: 19.9 K/UL (ref 3.6–11)

## 2019-12-18 PROCEDURE — 74011250636 HC RX REV CODE- 250/636: Performed by: EMERGENCY MEDICINE

## 2019-12-18 PROCEDURE — 93005 ELECTROCARDIOGRAM TRACING: CPT

## 2019-12-18 PROCEDURE — 36415 COLL VENOUS BLD VENIPUNCTURE: CPT

## 2019-12-18 PROCEDURE — 99285 EMERGENCY DEPT VISIT HI MDM: CPT

## 2019-12-18 PROCEDURE — 86738 MYCOPLASMA ANTIBODY: CPT

## 2019-12-18 PROCEDURE — 96374 THER/PROPH/DIAG INJ IV PUSH: CPT

## 2019-12-18 PROCEDURE — 83735 ASSAY OF MAGNESIUM: CPT

## 2019-12-18 PROCEDURE — 74011250636 HC RX REV CODE- 250/636: Performed by: FAMILY MEDICINE

## 2019-12-18 PROCEDURE — 85025 COMPLETE CBC W/AUTO DIFF WBC: CPT

## 2019-12-18 PROCEDURE — 96375 TX/PRO/DX INJ NEW DRUG ADDON: CPT

## 2019-12-18 PROCEDURE — 80053 COMPREHEN METABOLIC PANEL: CPT

## 2019-12-18 RX ORDER — ONDANSETRON 2 MG/ML
4 INJECTION INTRAMUSCULAR; INTRAVENOUS
Status: COMPLETED | OUTPATIENT
Start: 2019-12-18 | End: 2019-12-18

## 2019-12-18 RX ADMIN — ONDANSETRON 4 MG: 2 INJECTION INTRAMUSCULAR; INTRAVENOUS at 06:39

## 2019-12-18 RX ADMIN — Medication 10 ML: at 06:39

## 2019-12-18 RX ADMIN — SODIUM CHLORIDE 1000 ML: 900 INJECTION, SOLUTION INTRAVENOUS at 23:34

## 2019-12-18 RX ADMIN — ONDANSETRON 4 MG: 2 INJECTION INTRAMUSCULAR; INTRAVENOUS at 23:34

## 2019-12-18 NOTE — PROGRESS NOTES
8279- Called and left message with Caregiver Mejia Ron [phone] and SADIAGRACIELA Seniorgage Benitez [phone] in regards to Pt not being picked up yesterday for discharge. 5049- Caregiver returned call and stated that her car went into the shop yesterday and could not pick Pt up. Informed her that we could of set up transport had we have gotten a call with this information. Also POA could have transported after her work schedule. Informed care giver that we really needed to speak with POA, if she could please have her call us. [address] returned call. Started to inform me that no one had called to let her know about discharge. I informed her that multiple notes are in the chart stating that messages were left for her. She then states that she did talk to the Doctor yesterday about discharge. Informed her that Pt is medically stable and was discharged yesterday and arrangements need to be made for her to be picked up. POA states that she works. Offered to have CM make arrangements to assist with transport    5053- Spoke with caregiver Mejia Ron about pt having transport set up at 5:30pm. She will be at the apartment to meet her.

## 2019-12-18 NOTE — PROGRESS NOTES
12/18/2019   3:33 PM  CM received update from [address], pt's caregiver is coming to transport pt home. Confirmed with caregiver-Nayana, she will transport pt. Called [facility], [facility]: [phone], and cancelled transport for stretcher. Pt's caregiver in pt's room at this time awaiting discharge. No further d/c needs noted for pt.     9:50 AM  EMR Review, pt is set to discharge this date. Pt was scheduled to discharge on 12/17, but caregiver and POA did not show up. Per nursing, several calls made to advise of discharge and there was no return call. Charge Nurse made contact this date with POA and caregiver-Nayana and advised pt would be discharged today. Transportation to be arranged, caregiver will meet pt at her home at 5:30PM today. Pt has Medicaid coverage through [facility], [facility] called [facility]: [phone] and arranged stretcher transport for 5:30PM. Confirmation # [id number]. Nursing notified. AVS uploaded via [employer] to [facility]. No further d/c needs noted for pt at this time.      [address] [place] [address]

## 2019-12-18 NOTE — PROGRESS NOTES
Patient discharged with Prosper Morales, care giver, who stated that she understood her discharge instructions. Will be wheeled out to car in w/c by volunteer.

## 2019-12-18 NOTE — DISCHARGE SUMMARY
[facility] [place] [facility]  [address]  Tel: [phone]     Physician Discharge Summary     Patient ID:    Radha Zuniga  Age:              67 y.o.    : 1940  MRN:             [id number]      PCP: John Fisher MD      Admit date: 12/15/2019     Discharge date: 2019     Principal admission Diagnosis:   A-fib ([address] [I48.91]  Leukocytosis [D72.829]  Hydronephrosis of right kidney [N13.30]  Bilateral kidney stones [N20.0]     Discharge Diagnoses:    Hydronephrosis of right kidney (12/15/2019)    Bilateral kidney stones (12/15/2019)    HTN (hypertension), benign (2019)    Hypokalemia due to loss of potassium (2019)    Leukocytosis (12/15/2019)     Consults: Urology     Hospital Course:      Ms. Cristian Pulliam is a 67 y.o. admitted to [facility] and treated for the following:     Hydronephrosis of right kidney (12/15/2019) / Bilateral kidney stones (12/15/2019): seen by Urology and she is sp cystoscopy and right ureteral stent placement. Blood and urine cultures were neg. She will be discharged  but did not leave as family did not come to take her. She has remained stable and will be discharged today. Follow up with Urology      HTN (hypertension), benign (2019): DC home HCTZ. Started on low dose Amlodipine     Hypokalemia due to loss of potassium (2019): due to diuretic use. Repleted.  DC diuretic at discharge     Discharge Exam:    Visit Vitals  /62 (BP 1 Location: Right arm, BP Patient Position: At rest)   Pulse 74   Temp 98.1 °F (36.7 °C)   Resp 20   Ht 4' 8\" (1.422 m)   Wt 50 kg (110 lb 3.7 oz)   SpO2 98%   BMI 24.71 kg/m²         General: well looking and stable patient in no acute distress  Pulm:  clear breath sounds without wheezes  Card:   no murmurs, normal S1, S2 without thrills, bruits   Abd:    soft, non-tender, normoactive bowel sounds  Skin:   no rashes or ulcers, skin turgor is good  Neuro: awake, alert and has a non focal      Activity: Activity as tolerated     Diet: Regular Diet     Current Discharge Medication List      START taking these medications    Details   amLODIPine (NORVASC) 5 mg tablet Take 1 Tab by mouth daily for 30 days. Qty: 30 Tab, Refills: 0         CONTINUE these medications which have NOT CHANGED    Details   loperamide (IMMODIUM) 2 mg tablet Take 2 mg by mouth four (4) times daily as needed for Diarrhea. Indications: DIARRHEA         STOP taking these medications       Hydrochlorothiazide (HYDRODIURIL) 12.5 mg tablet Comments:   Reason for Stopping: Follow-up Information     Follow up With Specialties Details Why Contact Info    Indira Arnett MD Urology On 12/26/2019 Urology @ 8:30am. Please arrive 20 minutes early [facility]  [phone]      Elma Castro , MD Family Practice Call For follow up and review as needed  [address]  [phone]            Follow-up tests or labs: None    Discharge Condition: Stable    Disposition: home    Time taken to arrange discharge:  35 minutes.     Signed:  Wade Evans MD     [facility]  12/18/2019   12:35 PM

## 2019-12-19 ENCOUNTER — APPOINTMENT (OUTPATIENT)
Dept: GENERAL RADIOLOGY | Age: 79
DRG: 948 | End: 2019-12-19
Attending: INTERNAL MEDICINE
Payer: MEDICARE

## 2019-12-19 ENCOUNTER — APPOINTMENT (OUTPATIENT)
Dept: GENERAL RADIOLOGY | Age: 79
DRG: 948 | End: 2019-12-19
Attending: EMERGENCY MEDICINE
Payer: MEDICARE

## 2019-12-19 PROBLEM — F03.90 DEMENTIA (HCC): Status: ACTIVE | Noted: 2019-12-19

## 2019-12-19 PROBLEM — J18.9 PNEUMONIA: Status: ACTIVE | Noted: 2019-12-19

## 2019-12-19 PROBLEM — R53.81 DEBILITY: Status: ACTIVE | Noted: 2019-12-19

## 2019-12-19 PROBLEM — H91.90 HARD OF HEARING: Status: ACTIVE | Noted: 2019-12-19

## 2019-12-19 PROBLEM — R53.1 WEAK: Status: ACTIVE | Noted: 2019-12-19

## 2019-12-19 PROBLEM — R53.1 WEAKNESS: Status: ACTIVE | Noted: 2019-12-19

## 2019-12-19 LAB
ALBUMIN SERPL-MCNC: 2.6 G/DL (ref 3.5–5)
ALBUMIN/GLOB SERPL: 0.7 {RATIO} (ref 1.1–2.2)
ALP SERPL-CCNC: 92 U/L (ref 45–117)
ALT SERPL-CCNC: 18 U/L (ref 12–78)
ANION GAP SERPL CALC-SCNC: 4 MMOL/L (ref 5–15)
APPEARANCE UR: CLEAR
AST SERPL-CCNC: 18 U/L (ref 15–37)
B PERT DNA SPEC QL NAA+PROBE: NOT DETECTED
BACTERIA URNS QL MICRO: NEGATIVE /HPF
BILIRUB SERPL-MCNC: 0.5 MG/DL (ref 0.2–1)
BILIRUB UR QL: NEGATIVE
BORDETELLA PARAPERTUSSIS PCR, BORPAR: NOT DETECTED
BUN SERPL-MCNC: 14 MG/DL (ref 6–20)
BUN/CREAT SERPL: 30 (ref 12–20)
C PNEUM DNA SPEC QL NAA+PROBE: NOT DETECTED
CALCIUM SERPL-MCNC: 8.1 MG/DL (ref 8.5–10.1)
CHLORIDE SERPL-SCNC: 107 MMOL/L (ref 97–108)
CO2 SERPL-SCNC: 28 MMOL/L (ref 21–32)
COLOR UR: ABNORMAL
COMMENT, HOLDF: NORMAL
CREAT SERPL-MCNC: 0.47 MG/DL (ref 0.55–1.02)
EPITH CASTS URNS QL MICRO: ABNORMAL /LPF
FLUAV H1 2009 PAND RNA SPEC QL NAA+PROBE: NOT DETECTED
FLUAV H1 RNA SPEC QL NAA+PROBE: NOT DETECTED
FLUAV H3 RNA SPEC QL NAA+PROBE: NOT DETECTED
FLUAV SUBTYP SPEC NAA+PROBE: NOT DETECTED
FLUBV RNA SPEC QL NAA+PROBE: NOT DETECTED
GLOBULIN SER CALC-MCNC: 3.5 G/DL (ref 2–4)
GLUCOSE SERPL-MCNC: 137 MG/DL (ref 65–100)
GLUCOSE UR STRIP.AUTO-MCNC: NEGATIVE MG/DL
HADV DNA SPEC QL NAA+PROBE: NOT DETECTED
HCOV 229E RNA SPEC QL NAA+PROBE: NOT DETECTED
HCOV HKU1 RNA SPEC QL NAA+PROBE: NOT DETECTED
HCOV NL63 RNA SPEC QL NAA+PROBE: NOT DETECTED
HCOV OC43 RNA SPEC QL NAA+PROBE: NOT DETECTED
HGB UR QL STRIP: ABNORMAL
HMPV RNA SPEC QL NAA+PROBE: NOT DETECTED
HPIV1 RNA SPEC QL NAA+PROBE: NOT DETECTED
HPIV2 RNA SPEC QL NAA+PROBE: NOT DETECTED
HPIV3 RNA SPEC QL NAA+PROBE: NOT DETECTED
HPIV4 RNA SPEC QL NAA+PROBE: NOT DETECTED
HYALINE CASTS URNS QL MICRO: ABNORMAL /LPF (ref 0–5)
KETONES UR QL STRIP.AUTO: NEGATIVE MG/DL
LEUKOCYTE ESTERASE UR QL STRIP.AUTO: ABNORMAL
M PNEUMO DNA SPEC QL NAA+PROBE: NOT DETECTED
NITRITE UR QL STRIP.AUTO: NEGATIVE
PH UR STRIP: 6.5 [PH] (ref 5–8)
POTASSIUM SERPL-SCNC: 3.9 MMOL/L (ref 3.5–5.1)
PROCALCITONIN SERPL-MCNC: 0.05 NG/ML
PROT SERPL-MCNC: 6.1 G/DL (ref 6.4–8.2)
PROT UR STRIP-MCNC: 30 MG/DL
RBC #/AREA URNS HPF: ABNORMAL /HPF (ref 0–5)
RSV RNA SPEC QL NAA+PROBE: NOT DETECTED
RV+EV RNA SPEC QL NAA+PROBE: NOT DETECTED
SAMPLES BEING HELD,HOLD: NORMAL
SODIUM SERPL-SCNC: 139 MMOL/L (ref 136–145)
SP GR UR REFRACTOMETRY: 1 (ref 1–1.03)
UROBILINOGEN UR QL STRIP.AUTO: 1 EU/DL (ref 0.2–1)
WBC URNS QL MICRO: ABNORMAL /HPF (ref 0–4)

## 2019-12-19 PROCEDURE — 71045 X-RAY EXAM CHEST 1 VIEW: CPT

## 2019-12-19 PROCEDURE — 96365 THER/PROPH/DIAG IV INF INIT: CPT

## 2019-12-19 PROCEDURE — 87899 AGENT NOS ASSAY W/OPTIC: CPT

## 2019-12-19 PROCEDURE — 97162 PT EVAL MOD COMPLEX 30 MIN: CPT

## 2019-12-19 PROCEDURE — 92610 EVALUATE SWALLOWING FUNCTION: CPT

## 2019-12-19 PROCEDURE — 74011250636 HC RX REV CODE- 250/636: Performed by: INTERNAL MEDICINE

## 2019-12-19 PROCEDURE — 99218 HC RM OBSERVATION: CPT

## 2019-12-19 PROCEDURE — 87449 NOS EACH ORGANISM AG IA: CPT

## 2019-12-19 PROCEDURE — 71046 X-RAY EXAM CHEST 2 VIEWS: CPT

## 2019-12-19 PROCEDURE — 36415 COLL VENOUS BLD VENIPUNCTURE: CPT

## 2019-12-19 PROCEDURE — 74011250637 HC RX REV CODE- 250/637: Performed by: INTERNAL MEDICINE

## 2019-12-19 PROCEDURE — 81001 URINALYSIS AUTO W/SCOPE: CPT

## 2019-12-19 PROCEDURE — 96367 TX/PROPH/DG ADDL SEQ IV INF: CPT

## 2019-12-19 PROCEDURE — 96372 THER/PROPH/DIAG INJ SC/IM: CPT

## 2019-12-19 PROCEDURE — 84145 PROCALCITONIN (PCT): CPT

## 2019-12-19 PROCEDURE — 97116 GAIT TRAINING THERAPY: CPT

## 2019-12-19 PROCEDURE — 97165 OT EVAL LOW COMPLEX 30 MIN: CPT

## 2019-12-19 PROCEDURE — 74011000258 HC RX REV CODE- 258: Performed by: INTERNAL MEDICINE

## 2019-12-19 PROCEDURE — 0099U RESPIRATORY PANEL,PCR,NASOPHARYNGEAL: CPT

## 2019-12-19 PROCEDURE — 97535 SELF CARE MNGMENT TRAINING: CPT

## 2019-12-19 RX ORDER — AMLODIPINE BESYLATE 5 MG/1
5 TABLET ORAL DAILY
Status: DISCONTINUED | OUTPATIENT
Start: 2019-12-19 | End: 2019-12-21

## 2019-12-19 RX ORDER — ENOXAPARIN SODIUM 100 MG/ML
40 INJECTION SUBCUTANEOUS EVERY 24 HOURS
Status: DISCONTINUED | OUTPATIENT
Start: 2019-12-19 | End: 2020-01-06 | Stop reason: HOSPADM

## 2019-12-19 RX ORDER — SODIUM CHLORIDE 0.9 % (FLUSH) 0.9 %
5-40 SYRINGE (ML) INJECTION AS NEEDED
Status: DISCONTINUED | OUTPATIENT
Start: 2019-12-19 | End: 2020-01-06 | Stop reason: HOSPADM

## 2019-12-19 RX ORDER — SODIUM CHLORIDE 0.9 % (FLUSH) 0.9 %
5-40 SYRINGE (ML) INJECTION EVERY 8 HOURS
Status: DISCONTINUED | OUTPATIENT
Start: 2019-12-19 | End: 2020-01-06 | Stop reason: HOSPADM

## 2019-12-19 RX ADMIN — AZITHROMYCIN MONOHYDRATE 500 MG: 500 INJECTION, POWDER, LYOPHILIZED, FOR SOLUTION INTRAVENOUS at 04:46

## 2019-12-19 RX ADMIN — Medication 10 ML: at 21:05

## 2019-12-19 RX ADMIN — ENOXAPARIN SODIUM 40 MG: 40 INJECTION SUBCUTANEOUS at 04:04

## 2019-12-19 RX ADMIN — CEFTRIAXONE SODIUM 1 G: 1 INJECTION, POWDER, FOR SOLUTION INTRAVENOUS at 04:04

## 2019-12-19 RX ADMIN — Medication 20 ML: at 14:00

## 2019-12-19 RX ADMIN — AMLODIPINE BESYLATE 5 MG: 5 TABLET ORAL at 09:12

## 2019-12-19 RX ADMIN — Medication 10 ML: at 04:05

## 2019-12-19 NOTE — PROGRESS NOTES
CMS Note  12/19/2019    Patient was unable to obtain both the Observation and [facility] letters. CMS did leave copies of each letter on the bedside.    Selvin Antunez CMS

## 2019-12-19 NOTE — PROGRESS NOTES
Problem: Mobility Impaired (Adult and Pediatric)  Goal: *Acute Goals and Plan of Care (Insert Text)  Description  FUNCTIONAL STATUS PRIOR TO ADMISSION: At baseline patient uses rollator. HOME SUPPORT PRIOR TO ADMISSION: The patient lived alone with hired caregiver during daylight hours to provide assistance. Physical Therapy Goals  Initiated 12/19/2019  1. Patient will move from supine to sit and sit to supine  in bed with minimal assistance/contact guard assist within 7 day(s). 2.  Patient will transfer from bed to chair and chair to bed with minimal assistance using the least restrictive device within 7 day(s). 3.  Patient will perform sit to stand with minimal assistance/contact guard assist within 7 day(s). 4.  Patient will ambulate with minimal assistance/contact guard assist for 50 feet with the least restrictive device within 7 day(s). 5.  Patient will ascend/descend 4 stairs with 2 handrail(s) with minimal assistance/contact guard assist within 7 day(s). Outcome: Progressing Towards Goal  Note:   PHYSICAL THERAPY EVALUATION  Patient: Gabriela Rai (42 y.o. female)  Date: 12/19/2019  Primary Diagnosis: Weak [R53.1]  'light-for-dates' infant with signs of fetal malnutrition [P05.00]  Weakness [R53.1]        Precautions:   Fall, WBAT      ASSESSMENT  Based on the objective data described below, the patient presents with decreased balance and strength following admission for fall. Patient just discharged yesterday to home and sustained a fall. Recent hospitalization from 12/15-12-18 for stent placement. Patient has down syndrome and is hard of hearing at baseline, she has a hired caregiver but only daytime hours. All history obtained from chart due to patient's cognitive deficits and no caregiver present. She currently is requiring MIN-MOD A due to instability and poor balance. She is unsafe to return home without 24 hour hands on physical assistance for safety.     Current Level of Function Impacting Discharge (mobility/balance): MIN A bed mobility, MOD A sit-stand, MOD A x2 for hand held assist ambulation    Functional Outcome Measure: The patient scored 35/100 on the Barthel Index outcome measure. Other factors to consider for discharge:      Patient will benefit from skilled therapy intervention to address the above noted impairments. PLAN :  Recommendations and Planned Interventions: bed mobility training, transfer training, gait training, therapeutic exercises, neuromuscular re-education, and therapeutic activities      Frequency/Duration: Patient will be followed by physical therapy:  5 times a week to address goals. Recommendation for discharge: (in order for the patient to meet his/her long term goals)  To be determined: Rehab     This discharge recommendation:  A follow-up discussion with the attending provider and/or case management is planned    IF patient discharges home will need the following DME: to be determined (TBD)         SUBJECTIVE:   Patient stated dont take my cracker.     OBJECTIVE DATA SUMMARY:   HISTORY:    Past Medical History:   Diagnosis Date    Arthritis     Chronic kidney disease     kidney stones    Chronic pain     right hip    Hypertension     Ill-defined condition     chronic diarrhea     Past Surgical History:   Procedure Laterality Date    HX APPENDECTOMY      HX ORTHOPAEDIC      bilateral carpal tunnel    HX TONSILLECTOMY         Personal factors and/or comorbidities impacting plan of care: see above    Home Situation  Home Environment: Apartment  One/Two Story Residence: One story  Living Alone: Yes  Support Systems: (caregiver during daytime hours alone at night)  Patient Expects to be Discharged to[de-identified] Private residence  Current DME Used/Available at Home: arslan Nelson    EXAMINATION/PRESENTATION/DECISION MAKING:   Critical Behavior:  Neurologic State: Alert, Confused  Orientation Level: Oriented to person, Oriented to place, Disoriented to time, Disoriented to situation  Cognition: Decreased command following, Decreased attention/concentration     Hearing: Auditory  Auditory Impairment: Hard of hearing, bilateral  Skin:  all exposed intact  Edema: none noted  Range Of Motion:  AROM: Within functional limits           PROM: Within functional limits           Strength:    Strength: Generally decreased, functional                    Tone & Sensation:   Tone: Normal              Sensation: Intact               Coordination:  Coordination: Generally decreased, functional  Vision:      Functional Mobility:  Bed Mobility:  Rolling: Minimum assistance  Supine to Sit: Minimum assistance; Additional time;Assist x1        Transfers:  Sit to Stand: Additional time;Minimum assistance  Stand to Sit: Minimum assistance        Bed to Chair: Moderate assistance;Assist x1;Additional time              Balance:      Ambulation/Gait Training:  Distance (ft): 15 Feet (ft)  Assistive Device: Gait belt;Walker, rollator  Ambulation - Level of Assistance: Moderate assistance        Gait Abnormalities: Decreased step clearance; Path deviations;Trunk sway increased(forward flexed posture)        Base of Support: Narrowed      Attempted amualtion with rollator which is indicated at her premorbid device and she demonstrates poor management and increased instabiltiy, she is safer with hand held assist due to being able to not carry over verbal cuing due to [facility] and cognitive deficits                        Stairs: Therapeutic Exercises:       Functional Measure:  Barthel Index:    Bathin  Bladder: 0  Bowels: 5  Groomin  Dressin  Feedin  Mobility: 5  Stairs: 0  Toilet Use: 5  Transfer (Bed to Chair and Back): 5  Total: 35/100       The Barthel ADL Index: Guidelines  1. The index should be used as a record of what a patient does, not as a record of what a patient could do.   2. The main aim is to establish degree of independence from any help, physical or verbal, however minor and for whatever reason. 3. The need for supervision renders the patient not independent. 4. A patient's performance should be established using the best available evidence. Asking the patient, friends/relatives and nurses are the usual sources, but direct observation and common sense are also important. However direct testing is not needed. 5. Usually the patient's performance over the preceding 24-48 hours is important, but occasionally longer periods will be relevant. 6. Middle categories imply that the patient supplies over 50 per cent of the effort. 7. Use of aids to be independent is allowed. Link Heir., Barthel, D.W. (1210). Functional evaluation: the Barthel Index. [address] [facility] (14)2. Hellen Giraldo madison KYE Li, Dagoberto Hewitt., Donte Jose., [place], [address] [place] [address] (1999). Measuring the change indisability after inpatient rehabilitation; comparison of the responsiveness of the Barthel Index and Functional [place] Measure. Journal of Neurology, Neurosurgery, and Psychiatry, 66(4), 639-193. Jeny Rajput, N.J.GRACIELA, MALENA Alfaro, & Alisa Reyes MPiliA. (2004.) Assessment of post-stroke quality of life in cost-effectiveness studies: The usefulness of the Barthel Index and the EuroQoL-5D.  Quality of Life Research, 15, 504-10         Physical Therapy Evaluation Charge Determination   History Examination Presentation Decision-Making   HIGH Complexity :3+ comorbidities / personal factors will impact the outcome/ POC  HIGH Complexity : 4+ Standardized tests and measures addressing body structure, function, activity limitation and / or participation in recreation  MEDIUM Complexity : Evolving with changing characteristics  Other outcome measures Barthel Index  MEDIUM      Based on the above components, the patient evaluation is determined to be of the following complexity level: MEDIUM    Pain Rating:  None reported    Activity Tolerance:   Fair  Please refer to the flowsheet for vital signs taken during this treatment. After treatment patient left in no apparent distress:   Sitting in chair, Call bell within reach, and Bed / chair alarm activated    COMMUNICATION/EDUCATION:   The patients plan of care was discussed with: Occupational Therapist and Registered Nurse. Fall prevention education was provided and the patient/caregiver indicated understanding., Patient/family have participated as able in goal setting and plan of care. , and Patient/family agree to work toward stated goals and plan of care.     Thank you for this referral.  Janay Nunn, PT, DPT   Time Calculation: 19 mins

## 2019-12-19 NOTE — PROGRESS NOTES
12- Reason for Admission:   Weakness and Fall                  RRAT Score:  17                Do you (patient/family) have any concerns for transition/discharge? Yes-needs rehab               Plan for utilizing home health:   Not at this time    Current Advanced Directive/Advance Care Plan:  Full Code            Transition of Care Plan:        1. Patient here 12--12- and discharged home alone  2. Patient fell at home and is not able to care for herself when caregiver is not with her  2. Per POA, referrals sent to 3 SNF's    I spoke with the patient's Cassie Morillo ([phone]), to discuss discharge needs. The patient was here from 12--12-, discharged home and fell. Per the POA, the patient has a Medicaid caregiver M-S from 9 AM-4 PM but is really not able to care for herself when alone. Per the POA's request, choice for SNF's are [facility], [facility] and [facility] and the choice letter was placed on her chart. I have sent referrals to all 3 SNF's and will await their response.     Care Management Interventions  PCP Verified by CM: Yes(Dr. Harrison Pritchard nurse navigator)  Mode of Transport at Discharge: [address] (CM Consult): SNF  Discharge Durable Medical Equipment: No  Physical Therapy Consult: Yes  Occupational Therapy Consult: Yes  Speech Therapy Consult: Yes  Current Support Network: Lives Alone, Own Home  Confirm Follow Up Transport: (TBD)  The Plan for Transition of Care is Related to the Following Treatment Goals : Weakness and Fall  The Patient and/or Patient Representative was Provided with a Choice of Provider and Agrees with the Discharge Plan?: (S) Yes  Freedom of Choice List was Provided with Basic Dialogue that Supports the Patient's Individualized Plan of Care/Goals, Treatment Preferences and Shares the Quality Data Associated with the Providers?: (S) Yes  Discharge Location  Discharge Placement: (SNF)    CARLO Jackman, CM

## 2019-12-19 NOTE — ROUTINE PROCESS
Interdisciplinary team rounds were held 12/19/2019 with the following team members:Care Management, Nursing, Nutrition, Pharmacy, Physical Therapy and Physician. Plan of care discussed. See clinical pathway and/or care plan for interventions and desired outcomes. Plan: SNF at [place].

## 2019-12-19 NOTE — PROGRESS NOTES
Problem: Self Care Deficits Care Plan (Adult)  Goal: *Acute Goals and Plan of Care (Insert Text)  Description    FUNCTIONAL STATUS PRIOR TO ADMISSION: Patient was supervision for basic and instrumental ADLs. HOME SUPPORT: The patient lived alone with caregiver intermittently to provide assistance. Occupational Therapy Goals  Initiated 12/19/2019  1. Patient will perform lower body dressing with supervision/set-up within 7 day(s). 2.  Patient will perform grooming, standing at sink, with supervision/set-up within 7 day(s). 3.  Patient will perform toilet transfers with supervision/set-up within 7 day(s). 4.  Patient will perform all aspects of toileting with supervision/set-up within 7 day(s). 5.  Patient will participate in upper extremity therapeutic exercise/activities with supervision/set-up for 5 minutes within 7 day(s). Outcome: Progressing Towards Goal   OCCUPATIONAL THERAPY EVALUATION  Patient: Nikolas Powell (4 y.o. female)  Date: 12/19/2019  Primary Diagnosis: Weak [R53.1]  'light-for-dates' infant with signs of fetal malnutrition [P05.00]  Weakness [R53.1]        Precautions:   Fall, WBAT    ASSESSMENT  Based on the objective data described below, the patient presents with hospital admission secondary to decreased functional mobility, decreased strength and balance following fall at home. Patient discharged home from hospital earlier in day and returned after falling from bed. Patient with down syndrome and is very hard of hearing. She is able to follow commands, and unclear if intermittent difficulty is a result of poor hearing vs. inability. Patient demonstrates up to mod assist for ADL tasks and transfers with unsafe techniques for both. Patient currently unsafe to return home without continuous assist for safety due to current weakness and cognition.     Current Level of Function Impacting Discharge (ADLs/self-care): up to mod assist for transfers and ADLs    Functional Outcome Measure: The patient scored 35/100 on the Barthel Index outcome measure. Other factors to consider for discharge: lives alone     Patient will benefit from skilled therapy intervention to address the above noted impairments. PLAN :  Recommendations and Planned Interventions: self care training, functional mobility training, therapeutic exercise, balance training, therapeutic activities, endurance activities, patient education, home safety training, and family training/education    Frequency/Duration: Patient will be followed by occupational therapy 5 times a week to address goals. Recommendation for discharge: (in order for the patient to meet his/her long term goals)  Therapy up to 5 days/week in SNF setting    This discharge recommendation:  Has not yet been discussed the attending provider and/or case management    IF patient discharges home will need the following DME: to be determined (TBD)       SUBJECTIVE:   Patient stated You won't let me fall? Ryan Gene    OBJECTIVE DATA SUMMARY:   HISTORY:   Past Medical History:   Diagnosis Date    Arthritis     Chronic kidney disease     kidney stones    Chronic pain     right hip    Hypertension     Ill-defined condition     chronic diarrhea     Past Surgical History:   Procedure Laterality Date    HX APPENDECTOMY      HX ORTHOPAEDIC      bilateral carpal tunnel    HX TONSILLECTOMY         Expanded or extensive additional review of patient history:     Home Situation  Home Environment: Apartment  One/Two Story Residence: One story  Living Alone: Yes  Support Systems: (caregiver during daytime hours alone at night)  Patient Expects to be Discharged to[de-identified] Private residence  Current DME Used/Available at Home: Rheta Hark, rollator  Tub or Shower Type: Shower    Hand dominance: Right    EXAMINATION OF PERFORMANCE DEFICITS:  Cognitive/Behavioral Status:  Neurologic State: Alert;Confused  Orientation Level: Oriented to person;Oriented to place; Disoriented to time;Disoriented to situation  Cognition: Decreased attention/concentration;Decreased command following(very [place])  Perception: (hearing loss)  Perseveration: No perseveration noted  Safety/Judgement: Awareness of environment    Skin: intact as seen    Edema: none noted     Hearing: Auditory  Auditory Impairment: Hard of hearing, bilateral    Vision/Perceptual:                                     Range of Motion:  AROM: Within functional limits  PROM: Within functional limits                      Strength:    Strength: Generally decreased, functional                Coordination:  Coordination: Generally decreased, functional            Tone & Sensation:    Tone: Normal  Sensation: Intact                      Balance:  Sitting: Impaired  Standing: Impaired; With support  Standing - Static: Constant support  Standing - Dynamic : Fair;Constant support    Functional Mobility and Transfers for ADLs:  Bed Mobility:  Rolling: Minimum assistance  Supine to Sit: Minimum assistance; Additional time;Assist x1    Transfers:  Sit to Stand: Additional time;Minimum assistance  Stand to Sit: Minimum assistance  Bed to Chair: Moderate assistance;Assist x1;Additional time  Bathroom Mobility: Moderate assistance  Toilet Transfer : Moderate assistance    ADL Assessment:  Feeding: Setup    Oral Facial Hygiene/Grooming: Contact guard assistance    Bathing: Minimum assistance    Upper Body Dressing: Minimum assistance    Lower Body Dressing: Minimum assistance    Toileting: Minimum assistance                ADL Intervention and task modifications:                                     Cognitive Retraining  Safety/Judgement: Awareness of environment    Therapeutic Exercise:     Functional Measure:  Barthel Index:    Bathin  Bladder: 0  Bowels: 5  Groomin  Dressin  Feedin  Mobility: 5  Stairs: 0  Toilet Use: 5  Transfer (Bed to Chair and Back): 5  Total: 35/100        The Barthel ADL Index: Guidelines  1.  The index should be used as a record of what a patient does, not as a record of what a patient could do. 2. The main aim is to establish degree of independence from any help, physical or verbal, however minor and for whatever reason. 3. The need for supervision renders the patient not independent. 4. A patient's performance should be established using the best available evidence. Asking the patient, friends/relatives and nurses are the usual sources, but direct observation and common sense are also important. However direct testing is not needed. 5. Usually the patient's performance over the preceding 24-48 hours is important, but occasionally longer periods will be relevant. 6. Middle categories imply that the patient supplies over 50 per cent of the effort. 7. Use of aids to be independent is allowed. Suzi Wiley., Barthel, D.W. (0284). Functional evaluation: the Barthel Index. [address] [facility] (14)2. [facility] KYE Li, Nicolás Loaiza.[facility], [address] [place] [address] (1999). Measuring the change indisability after inpatient rehabilitation; comparison of the responsiveness of the Barthel Index and Functional [place] Measure. Journal of Neurology, Neurosurgery, and Psychiatry, 66(4), 978-133. Alanis Haile, N.J.A, MALENA Alfaro, & Amber Edwards M.A. (2004.) Assessment of post-stroke quality of life in cost-effectiveness studies: The usefulness of the Barthel Index and the EuroQoL-5D.  Quality of Life Research, 15, 973-93         Occupational Therapy Evaluation Charge Determination   History Examination Decision-Making   LOW Complexity : Brief history review  LOW Complexity : 1-3 performance deficits relating to physical, cognitive , or psychosocial skils that result in activity limitations and / or participation restrictions  LOW Complexity : No comorbidities that affect functional and no verbal or physical assistance needed to complete eval tasks       Based on the above components, the patient evaluation is determined to be of the following complexity level: LOW   Pain Rating:      Activity Tolerance:   Good  Please refer to the flowsheet for vital signs taken during this treatment. After treatment patient left in no apparent distress:    Sitting in chair, Call bell within reach, and Bed / chair alarm activated    COMMUNICATION/EDUCATION:   The patients plan of care was discussed with: Physical Therapist and Registered Nurse. Home safety education was provided and the patient/caregiver indicated understanding., Patient/family have participated as able in goal setting and plan of care. , and Patient/family agree to work toward stated goals and plan of care. This patients plan of care is appropriate for delegation to [facility].     Thank you for this referral.  Laverne Rodriguez OTR/L  Time Calculation: 20 mins

## 2019-12-19 NOTE — ED TRIAGE NOTES
Arrives via EMS  Recent dc from hospital for kidney stones  Pt had a fall today in her apartment, pt was yelling, neighbors heard her  Pt c/o Right elbow pain initially, pt denies pain currently

## 2019-12-19 NOTE — PROGRESS NOTES
TRANSFER - IN REPORT:    Verbal report received from KAHLIL PRESLEY(name) on Vanesa Cons  being received from ED (unit) for routine progression of care      Report consisted of patients Situation, Background, Assessment and   Recommendations(SBAR). Information from the following report(s) SBAR, Kardex, ED Summary and Recent Results was reviewed with the receiving nurse. Opportunity for questions and clarification was provided. Assessment completed upon patients arrival to unit and care assumed.

## 2019-12-19 NOTE — ED NOTES
TRANSFER - OUT REPORT:    Verbal report given to Yenifer(name) on Yari Kaur  being transferred to [facility](unit) for routine progression of care       Report consisted of patients Situation, Background, Assessment and   Recommendations(SBAR). Information from the following report(s) SBAR, Kardex, ED Summary, [facility] and Recent Results was reviewed with the receiving nurse. Lines:   Peripheral IV 12/18/19 Right Antecubital (Active)   Site Assessment Clean, dry, & intact 12/18/2019 11:37 PM   Phlebitis Assessment 0 12/18/2019 11:37 PM   Infiltration Assessment 0 12/18/2019 11:37 PM   Dressing Status Clean, dry, & intact 12/18/2019 11:37 PM   Dressing Type Tape;Transparent 12/18/2019 11:37 PM   Hub Color/Line Status Pink;Flushed 12/18/2019 11:37 PM   Action Taken Blood drawn 12/18/2019 11:37 PM        Opportunity for questions and clarification was provided.       Patient transported with:   [facility]

## 2019-12-19 NOTE — H&P
[facility]  [address] [place] [address]  [phone]    Admission History and Physical      NAME:  Kwame Jones   :   1940   MRN:  [id number]     PCP:  Ernst Castro MD     Date/Time:  2019         Subjective:     CHIEF COMPLAINT: Weakness, fall     HISTORY OF PRESENT ILLNESS:     Ms. Meli Sheffield is a 67 y.o.  female who is admitted with debility. Ms. Meli Sheffield with past medical history of kidney stone, hypertension, chronic diarrhea, dementia, hard of hearing was brought to ER after she had a fall. Patient was discharged yesterday from [facility] where she was admitted for kidney stone and hydronephrosis. Patient unable to provide history and no family member with the patient, history obtained from medical records. According to the reports, patient was yelling  after she felled and her neighbors called EMS. After the fall she was complaining of elbow pain. Patient lives in an apartment with her caregiver. Past Medical History:   Diagnosis Date    Arthritis     Chronic kidney disease     kidney stones    Chronic pain     right hip    Hypertension     Ill-defined condition     chronic diarrhea        Past Surgical History:   Procedure Laterality Date    HX APPENDECTOMY      HX ORTHOPAEDIC      bilateral carpal tunnel    HX TONSILLECTOMY         Social History     Tobacco Use    Smoking status: Never Smoker    Smokeless tobacco: Never Used   Substance Use Topics    Alcohol use: No        Family History   Problem Relation Age of Onset    Cancer Father         Allergies   Allergen Reactions    Pcn [Penicillins] Itching    Sulfa (Sulfonamide Antibiotics) Itching        Prior to Admission medications    Medication Sig Start Date End Date Taking? Authorizing Provider   loperamide (IMMODIUM) 2 mg tablet Take 2 mg by mouth four (4) times daily as needed for Diarrhea.  Indications: DIARRHEA   Yes Provider, Historical amLODIPine (NORVASC) 5 mg tablet Take 1 Tab by mouth daily for 30 days.  12/17/19 1/16/20  Lucy Yin MD         Review of Systems:  (bold if positive, if negative)    Gen:  Eyes:  ENT:  CVS:  Pulm:  GI:    :    MS:  Skin:  Psych:  Endo:    Hem:  Renal:    Neuro:            Objective:      VITALS:    Vital signs reviewed; most recent are:    Visit Vitals  /49   Pulse 77   Temp 98.4 °F (36.9 °C)   Resp 18   Ht 5' (1.524 m)   Wt 49.9 kg (110 lb)   SpO2 100%   BMI 21.48 kg/m²     SpO2 Readings from Last 6 Encounters:   12/19/19 100%   12/18/19 99%   12/08/19 100%   11/20/19 99%   10/31/19 99%   10/13/19 98%            Intake/Output Summary (Last 24 hours) at 12/19/2019 0209  Last data filed at 12/19/2019 0019  Gross per 24 hour   Intake 1000 ml   Output    Net 1000 ml            Exam:     Physical Exam:    Gen: Thin, in no acute distress  HEENT:  Pink conjunctivae, PERRL, hearing intact to voice, moist mucous membranes  Neck:  Supple, without masses, thyroid non-tender  Resp:  No accessory muscle use, clear breath sounds without wheezes rales or rhonchi  Card:  No murmurs, normal S1, S2 without thrills, bruits or peripheral edema  Abd:  Soft, non-tender, non-distended, normoactive bowel sounds are present, no palpable organomegaly and no detectable hernias  Lymph:  No cervical or inguinal adenopathy  Musc:  No cyanosis or clubbing  Skin:  No rashes or ulcers, skin turgor is good  Neuro:  Cranial nerves are grossly intact, no focal motor weakness, follows commands appropriately  Psych: Poor insight       Labs:    Recent Labs     12/18/19  2332   WBC 19.9*   HGB 14.9   HCT 44.4        Recent Labs     12/18/19  2332 12/18/19  0517    140   K 3.9 3.7    110*   CO2 28 26   * 115*   BUN 14 7   CREA 0.47* 0.42*   CA 8.1* 8.0*   MG  --  2.0   ALB 2.6*  --    TBILI 0.5  --    SGOT 18  --    ALT 18  --      No results found for: GLUCPOC  No results for input(s): PH, PCO2, PO2, HCO3, FIO2 in the last 72 hours. No results for input(s): INR, INREXT in the last 72 hours. Telemetry reviewed:   normal sinus rhythm       Assessment/Plan:    1. Weak (12/19/2019)/ Debility (12/19/2019)/ fall. Admit to medical under observation. Fall precaution. PT and OT evaluation. May need rehab placement and will consult case management. 2.  Leukocytosis (12/15/2019)/ Pneumonia (12/19/2019). Patient has a cough and her leukocytosis is worsening, her chest x-ray shows mild airspace disease on the right lobe. Will start antibiotics and check pneumonia work-up    3. Bilateral kidney stones (12/15/2019)/hydronephrosis. S/p cystoscopy and right ureteral stent placement on last admission. Will need to follow-up with urology upon discharge    4. HTN (hypertension) (12/16/2019). Continue amlodipine    5. Hard of hearing (12/19/2019)/ Dementia ([address] (12/19/2019).   Supportive care         Previous medical records reviewed     Risk of deterioration: medium      Total time spent with patient: 68 [address] discussed with: Patient, Nursing Staff and >50% of time spent in counseling and coordination of care    Discussed:  Care Plan    Prophylaxis:  Lovenox    Probable Disposition:  SNF/LTC           ___________________________________________________    Attending Physician: James Rothman MD

## 2019-12-19 NOTE — PROGRESS NOTES
[facility]    Medical Progress Note      NAME: Yari Kaur   :  1940  MRM:  [id number]    Date/Time: 2019  11:30 AM          Assessment and Plan:     Weak / Debility / Fall - Acute and chronic, related to acute deconditioning following recent hospitalization. Fall precaution. PT and OT evaluation. Needs SNF rehab placement. Consult case management. Leukocytosis - Chronic leukocytosis for a week. No bandemia, no fever. Abnomral single view chest x-ray. Check PA and lat as I suspect atelectasis. Negative procalcitonin, checking RVP and PNA labs. Will start Abx only if positive finding of PNA     Bilateral kidney stones / Hydronephrosis - S/p cystoscopy and right ureteral stent placement on last admission. Follow-up with urology upon discharge     HTN (hypertension) - Continue amlodipine     Dementia / Hard of hearing - Supportive care. Would benefit from outpatient neuropsych testing of official Dx and Rx. Subjective:     Chief Complaint: weak    ROS:  (bold if positive, if negative)    Tolerating some PT  Tolerating Diet        Objective:     Last 24hrs VS reviewed since prior progress note.  Most recent are:    Visit Vitals  /71 (BP 1 Location: Left arm, BP Patient Position: At rest)   Pulse 71   Temp 99 °F (37.2 °C)   Resp 18   Ht 5' (1.524 m)   Wt 49.9 kg (110 lb)   SpO2 97%   BMI 21.48 kg/m²     SpO2 Readings from Last 6 Encounters:   19 97%   19 99%   19 100%   19 99%   10/31/19 99%   10/13/19 98%            Intake/Output Summary (Last 24 hours) at 2019 0834  Last data filed at 2019 0242  Gross per 24 hour   Intake 1000 ml   Output    Net 1000 ml        Physical Exam:    Gen:  Thin, frail, in no acute distress  HEENT:  Pink conjunctivae, PERRL, hearing intact to voice, moist mucous membranes  Neck:  Supple, without masses, thyroid non-tender  Resp:  No accessory muscle use, clear breath sounds without wheezes rales or rhonchi  Card:  No murmurs, normal S1, S2 without thrills, bruits or peripheral edema  Abd:  Soft, non-tender, non-distended, normoactive bowel sounds are present, no mass  Lymph:  No cervical or inguinal adenopathy  Musc:  No cyanosis or clubbing  Skin:  No rashes or ulcers, skin turgor is good  Neuro:  Cranial nerves are grossly intact, general motor weakness, follows commands   Psych:  Poor insight, oriented to person, place     Telemetry reviewed:   normal sinus rhythm  __________________________________________________________________  Medications Reviewed: (see below)  Medications:     Current Facility-Administered Medications   Medication Dose Route Frequency    amLODIPine (NORVASC) tablet 5 mg  5 mg Oral DAILY    sodium chloride (NS) flush 5-40 mL  5-40 mL IntraVENous Q8H    sodium chloride (NS) flush 5-40 mL  5-40 mL IntraVENous PRN    enoxaparin (LOVENOX) injection 40 mg  40 mg SubCUTAneous Q24H        Lab Data Reviewed: (see below)  Lab Review:     Recent Labs     12/18/19  2332 12/18/19  1004 12/17/19  0329   WBC 19.9* 14.4* 17.5*   HGB 14.9 14.6 13.5   HCT 44.4 43.8 40.8    368 354     Recent Labs     12/18/19  2332 12/18/19  0517 12/17/19  0329    140 141   K 3.9 3.7 3.8    110* 110*   CO2 28 26 26   * 115* 118*   BUN 14 7 8   CREA 0.47* 0.42* 0.55   CA 8.1* 8.0* 7.9*   MG  --  2.0  --    ALB 2.6*  --   --    TBILI 0.5  --   --    SGOT 18  --   --    ALT 18  --   --      No results found for: GLUCPOC  No results for input(s): PH, PCO2, PO2, HCO3, FIO2 in the last 72 hours. No results for input(s): INR, INREXT in the last 72 hours. All Micro Results     Procedure Component Value Units Date/Time    RESPIRATORY PANEL,PCR,NASOPHARYNGEAL [[id number]]     Order Status:  Sent Specimen:  NASOPHARYNGEAL SWAB     S. Birtha Alter, UR/CSF [[id number]]     Order Status:  Sent     LEGIONELLA PNEUMOPHILA Socorro Isjeff URINE [[id number]]     Order Status:  Sent Specimen:  Urine           Other pertinent lab: none    Total time spent with patient: 45 Minutes I rounded from 10:45 to 11:30 AM, in addition to the time spent by my partner Dr Ayad Sandoval earlier in the day. I reviewed chart, notes, data and current medications in the medical record. I have examined and treated the patient at bedside during this period. I made additional diagnoses, placed additional orders and had additional discussions.                  Care Plan discussed with: Patient, Care Manager, Nursing Staff and >50% of time spent in counseling and coordination of care    Discussed:  Care Plan and D/C Planning    Prophylaxis:  H2B/PPI    Disposition:  SNF/LTC           ___________________________________________________    Attending Physician: Le Diez MD

## 2019-12-19 NOTE — ED NOTES
Bedside and Verbal shift change report given to Brandyn (oncoming nurse) by Siddharth Carrero (offgoing nurse). Report included the following information SBAR and ED Summary.

## 2019-12-19 NOTE — PROGRESS NOTES
[facility]: MED RECONCILIATION    Comments/Recommendations:     Patient was just discharged earlier today (12/18/19) so no real changes with medication list. Patient is sleeping soundly, however this pharmacist s/w Christian Torre (caregiver) via phone for medication information. HCTZ was stopped on discharge (hypokalemia) and amlodipine 5 mg PO daily was prescribed. Per Christian Torre, she dropped the new prescription for amlodipine off at [address] who is to deliver the medication to her home on 12/19/19. She has not yet started taking this. Information obtained from: patient's caregiver Christian Torre, Rx query, progress notes, discharge summary from 12/18/19    Significant PMH/Disease States:   Past Medical History:   Diagnosis Date    Arthritis     Chronic kidney disease     kidney stones    Chronic pain     right hip    Hypertension     Ill-defined condition     chronic diarrhea     Chief Complaint for this Admission:   Chief Complaint   Patient presents with    Fall    Elbow Pain     Allergies: Pcn [penicillins] and Sulfa (sulfonamide antibiotics)    Prior to Admission Medications:     Medication Documentation Review Audit       Reviewed by KHADRA WilburnD (Pharmacist) on 12/18/19 at 65      Medication Sig Documenting Provider Last Dose Status Taking? amLODIPine (NORVASC) 5 mg tablet Take 1 Tab by mouth daily for 30 days. Flaca Marks MD  Active            Med Note (Kayyoandelores Pitts   Wed Dec 18, 2019 10:29 PM) Prescribed 12/18/19 upon discharge, but patient has not yet started taking. Prescription was dropped off and [facility] is scheduled to delivery to patient's home on 12/19/19 per Christian Torre, caregiver. loperamide (IMMODIUM) 2 mg tablet Take 2 mg by mouth four (4) times daily as needed for Diarrhea. Indications: DIARRHEA Provider, Historical  Active Yes                  Thank you for the consult,  Vinod SilvestreDignity Health St[facility]

## 2019-12-19 NOTE — PROGRESS NOTES
Bedside shift change report given to Jesica Luna RN (oncoming nurse) by Mamadou Sanchez RN (offgoing nurse). Report included the following information SBAR and Kardex.

## 2019-12-19 NOTE — PROGRESS NOTES
Problem: Dysphagia (Adult)  Goal: *Acute Goals and Plan of Care (Insert Text)  Description  Speech pathology goals  Initiated 12/19/2019  1. Patient will tolerate regular/thin liquid diet with no overt s/s aspiration within 7 days   Outcome: Progressing Towards Goal     SPEECH LANGUAGE PATHOLOGY BEDSIDE SWALLOW EVALUATION  Patient: Colin Valentin (53 y.o. female)  Date: 12/19/2019  Primary Diagnosis: Weak [R53.1]  'light-for-dates' infant with signs of fetal malnutrition [P05.00]  Weakness [R53.1]        Precautions: swallow  Fall, WBAT    ASSESSMENT :  Based on the objective data described below, the patient presents with suspected mild oropharyngeal dysphagia characterized by prolonged mastication, suspected premature spillage, delayed posterior propulsion, and suspected delayed swallow initiation. Patient with intermittent throat clearing during session and delayed cough after thin liquids x1, however this was not reproducible with additional ~6oz thin liquid. Patient is at risk for aspiration secondary to dementia and cognitive status. Patient will benefit from skilled intervention to address the above impairments. Patients rehabilitation potential is considered to be Fair     PLAN :  Recommendations and Planned Interventions:  -Regular / Thin liquid diet.   -Upright 90 degrees, Single sips and Small bites. -Meds as tolerated  -SLP to follow for diet tolerance  Frequency/Duration: Patient will be followed by speech-language pathology 3 times a week to address goals. Discharge Recommendations: To Be Determined     SUBJECTIVE:   Patient stated Betsy Friday you the vampire?  Patient confused.     OBJECTIVE:     Past Medical History:   Diagnosis Date    Arthritis     Chronic kidney disease     kidney stones    Chronic pain     right hip    Hypertension     Ill-defined condition     chronic diarrhea     Past Surgical History:   Procedure Laterality Date    HX APPENDECTOMY      HX ORTHOPAEDIC      bilateral carpal tunnel    HX TONSILLECTOMY       Prior Level of Function/Home Situation:   Home Situation  Home Environment: Apartment  One/Two Story Residence: One story  Living Alone: Yes  Support Systems: (caregiver during daytime hours alone at night)  Patient Expects to be Discharged to[de-identified] Private residence  Current DME Used/Available at Home: Namita Fragoso, rollator  Diet prior to admission: unknown, suspect regular/thin  Current Diet:  Regular/thin   Cognitive and Communication Status:  Neurologic State: Alert, Confused  Orientation Level: Oriented to person, Oriented to place, Disoriented to time, Disoriented to situation  Cognition: Decreased command following, Decreased attention/concentration  Perception: (hearing loss)        Oral Assessment:  Oral Assessment  Labial: (difficult to assess due to hearing loss, cognition)  Dentition: Natural  Oral Hygiene: moist oral mucosa  P.O. Trials:  Patient Position: upright in bed, leaning to right despite repositioning  Vocal quality prior to P.O.: No impairment  Consistency Presented: Thin liquid;Puree; Solid  How Presented: Self-fed/presented;Cup/sip;Cup/gulp; Spoon;Straw;Successive swallows     Bolus Acceptance: No impairment  Bolus Formation/Control: Impaired  Type of Impairment: Delayed;Mastication;Premature spillage  Propulsion: Delayed (# of seconds)  Oral Residue: None  Initiation of Swallow: Delayed (# of seconds)  Laryngeal Elevation: Functional  Aspiration Signs/Symptoms: Delayed cough/throat clear; Other (comment)(intermittent throat clear, cough x1)  Pharyngeal Phase Characteristics: No impairment, issues, or problems              Oral Phase Severity: Mild  Pharyngeal Phase Severity : Mild    NOMS:   The NOMS functional outcome measure was used to quantify this patient's level of swallowing impairment.   Based on the NOMS, the patient was determined to be at level 6 for swallow function       NOMS Swallowing Levels:  Level 1 (CN): NPO  Level 2 (CM): NPO but takes consistency in therapy  Level 3 (CL): Takes less than 50% of nutrition p.o. and continues with nonoral feedings; and/or safe with mod cues; and/or max diet restriction  Level 4 (CK): Safe swallow but needs mod cues; and/or mod diet restriction; and/or still requires some nonoral feeding/supplements  Level 5 (CJ): Safe swallow with min diet restriction; and/or needs min cues  Level 6 (CI): Independent with p.o.; rare cues; usually self cues; may need to avoid some foods or needs extra time  Level 7 ([address] [place] [address]): Independent for all p.o.  TOMMY. (2003). National Outcomes Measurement System (NOMS): Adult Speech-Language Pathology User's Guide. Pain:  Pain Scale 1: Numeric (0 - 10)  Pain Intensity 1: 0       After treatment:   Patient left in no apparent distress in bed, Call bell within reach and Nursing notified    COMMUNICATION/EDUCATION:   The patient's plan of care including recommendations, planned interventions, and recommended diet changes were discussed with: Registered Nurse. Patient is unable to participate in goal setting and plan of care.     Thank you for this referral.  JOSE A Burr  Time Calculation: 15 mins

## 2019-12-19 NOTE — ED PROVIDER NOTES
67 y.o. female with past medical history significant for arthritis, chronic kidney disease, chronic hip pain (right), hypertension, and chronic diarrhea who presents via EMS with chief complaint of right elbow pain secondary to a ground level fall. EMS reports the patient encountered a ground level fall earlier today at her independent living facility shortly after being discharged from [facility]. She states she fell out of bed but it unsure of how. She denies hitting her head or losing consciousness. The patient was admitted to [facility] on 12/15/2019 for acute cystitis with hematuria and was discharged today. During her admission, a stent in her right kidney was placed. EMS reports the patient complained of right elbow pain on scene, however she does not complain of any pain in the elbow at this time. She did not take any medication for her pain prior to arrival. The patient does complain of left side abdominal pain. There are no other acute medical concerns at this time. Social hx: denies tobacco, denies alcohol, denies illicit drug use  Surgical Hx: appendectomy, tonsillectomy, bilateral carpal tunnel, right kidney stent   Allergies: Penicillin, sulfa    PCP: Payal Castro MD    Note written by Karoline Heredia, as dictated by Vianney Ramos MD 10:05 PM        The history is provided by the patient and the EMS personnel. No  was used.         Past Medical History:   Diagnosis Date    Arthritis     Chronic kidney disease     kidney stones    Chronic pain     right hip    Hypertension     Ill-defined condition     chronic diarrhea       Past Surgical History:   Procedure Laterality Date    HX APPENDECTOMY      HX ORTHOPAEDIC      bilateral carpal tunnel    HX TONSILLECTOMY           Family History:   Problem Relation Age of Onset    Cancer Father        Social History     Socioeconomic History    Marital status:      Spouse name: Not on file    Number of children: Not on file    Years of education: Not on file    Highest education level: Not on file   Occupational History    Not on file   Social Needs    Financial resource strain: Not on file    Food insecurity:     Worry: Not on file     Inability: Not on file    Transportation needs:     Medical: Not on file     Non-medical: Not on file   Tobacco Use    Smoking status: Never Smoker    Smokeless tobacco: Never Used   Substance and Sexual Activity    Alcohol use: No    Drug use: No    Sexual activity: Not on file   Lifestyle    Physical activity:     Days per week: Not on file     Minutes per session: Not on file    Stress: Not on file   Relationships    Social connections:     Talks on phone: Not on file     Gets together: Not on file     Attends Rastafari service: Not on file     Active member of club or organization: Not on file     Attends meetings of clubs or organizations: Not on file     Relationship status: Not on file    Intimate partner violence:     Fear of current or ex partner: Not on file     Emotionally abused: Not on file     Physically abused: Not on file     Forced sexual activity: Not on file   Other Topics Concern    Not on file   Social History Narrative    Not on file         ALLERGIES: Pcn [penicillins] and Sulfa (sulfonamide antibiotics)    Review of Systems   Constitutional: Negative for chills and fever. HENT: Negative for drooling and nosebleeds. Eyes: Negative for pain and itching. Respiratory: Negative for choking and stridor. Cardiovascular: Negative for leg swelling. Gastrointestinal: Positive for abdominal pain (left side). Negative for abdominal distention and rectal pain. Endocrine: Negative for heat intolerance and polyphagia. Genitourinary: Negative for enuresis and genital sores. Musculoskeletal: Positive for arthralgias (right elbow). Negative for joint swelling. Skin: Negative for color change. Allergic/Immunologic: Negative for immunocompromised state. Neurological: Negative for tremors and speech difficulty. Hematological: Negative for adenopathy. Psychiatric/Behavioral: Negative for dysphoric mood and sleep disturbance. Vitals:    12/18/19 2148   BP: 135/46   Pulse: 77   Resp: 18   Temp: 98.4 °F (36.9 °C)   SpO2: 95%   Weight: 49.9 kg (110 lb)   Height: 5' (1.524 m)            Physical Exam  Vitals signs and nursing note reviewed. Constitutional:       Appearance: She is cachectic. Comments: Speaks in full sentences. Answers appropriately. HENT:      Head: Normocephalic. Nose: Nose normal.   Eyes:      Conjunctiva/sclera: Conjunctivae normal.   Neck:      Musculoskeletal: Normal range of motion and neck supple. Cardiovascular:      Rate and Rhythm: Regular rhythm. Heart sounds: Normal heart sounds. Pulmonary:      Effort: Pulmonary effort is normal. No respiratory distress. Abdominal:      General: There is no distension. Palpations: Abdomen is soft. Musculoskeletal: Normal range of motion. General: No tenderness (upper/lower extremity) or deformity. Skin:     General: Skin is warm and dry. Neurological:      Mental Status: She is alert. Coordination: Coordination normal.   Psychiatric:         Behavior: Behavior normal.     Note written by Karoline Hanson, as dictated by Dariel Apple MD 10:05 PM       MDM  Number of Diagnoses or Management Options  Fall, initial encounter:   Generalized weakness:   Leukocytosis, unspecified type:   Diagnosis management comments: Discharge earlier in day and pt returns after fall. Notes nausea and reports she feels weak. No evidence of extremity fracture on my exam and pt answering questions appropriately. Leukocytosis notes along with  ?airspace disease noted on CXR. Will defer decision re abx to inpatient team. She remains HD stable, resting in no distress. Will likely need placement since pt currently lives in independent living facility.         Amount and/or Complexity of Data Reviewed  Decide to obtain previous medical records or to obtain history from someone other than the patient: yes           Procedures    Hospitalist Perfect Serve for Admission  1:42 AM    ED Room Number: ER09/09  Patient Name and age:  Lissett Alexis 67 y.o.  female  Working Diagnosis:   1. Generalized weakness    2. Fall, initial encounter    3. Leukocytosis, unspecified type      Readmission: yes  Isolation Requirements:  no  Recommended Level of Care:  med/surg  Code Status:  Full Code  Department:[facility] ED - [phone]  Other:  Pt discharged home on 12/18. Returns after fall off of bed. Weak per pt. New leukocytosis with ?airspace disease on CXR. Lives in independent living facility. May need placement. Patient is being admitted to the hospital.  The results of their tests and reasons for their admission have been discussed with them and/or available family. They convey agreement and understanding for the need to be admitted and for their admission diagnosis.

## 2019-12-20 LAB
ANION GAP SERPL CALC-SCNC: 4 MMOL/L (ref 5–15)
BACTERIA SPEC CULT: NORMAL
BUN SERPL-MCNC: 9 MG/DL (ref 6–20)
BUN/CREAT SERPL: 20 (ref 12–20)
CALCIUM SERPL-MCNC: 7.9 MG/DL (ref 8.5–10.1)
CHLORIDE SERPL-SCNC: 109 MMOL/L (ref 97–108)
CO2 SERPL-SCNC: 27 MMOL/L (ref 21–32)
CREAT SERPL-MCNC: 0.45 MG/DL (ref 0.55–1.02)
ERYTHROCYTE [DISTWIDTH] IN BLOOD BY AUTOMATED COUNT: 12.5 % (ref 11.5–14.5)
GLUCOSE SERPL-MCNC: 112 MG/DL (ref 65–100)
HCT VFR BLD AUTO: 39.1 % (ref 35–47)
HGB BLD-MCNC: 13 G/DL (ref 11.5–16)
M PNEUMO IGG SER IA-ACNC: 178 U/ML (ref 0–99)
M PNEUMO IGM SER IA-ACNC: <770 U/ML (ref 0–769)
MCH RBC QN AUTO: 31.1 PG (ref 26–34)
MCHC RBC AUTO-ENTMCNC: 33.2 G/DL (ref 30–36.5)
MCV RBC AUTO: 93.5 FL (ref 80–99)
NRBC # BLD: 0 K/UL (ref 0–0.01)
NRBC BLD-RTO: 0 PER 100 WBC
PLATELET # BLD AUTO: 358 K/UL (ref 150–400)
PMV BLD AUTO: 10.1 FL (ref 8.9–12.9)
POTASSIUM SERPL-SCNC: 3.9 MMOL/L (ref 3.5–5.1)
RBC # BLD AUTO: 4.18 M/UL (ref 3.8–5.2)
SERVICE CMNT-IMP: NORMAL
SODIUM SERPL-SCNC: 140 MMOL/L (ref 136–145)
WBC # BLD AUTO: 15.1 K/UL (ref 3.6–11)

## 2019-12-20 PROCEDURE — 92526 ORAL FUNCTION THERAPY: CPT

## 2019-12-20 PROCEDURE — 80048 BASIC METABOLIC PNL TOTAL CA: CPT

## 2019-12-20 PROCEDURE — 97535 SELF CARE MNGMENT TRAINING: CPT

## 2019-12-20 PROCEDURE — 74011250637 HC RX REV CODE- 250/637: Performed by: INTERNAL MEDICINE

## 2019-12-20 PROCEDURE — 99218 HC RM OBSERVATION: CPT

## 2019-12-20 PROCEDURE — 85027 COMPLETE CBC AUTOMATED: CPT

## 2019-12-20 PROCEDURE — 36415 COLL VENOUS BLD VENIPUNCTURE: CPT

## 2019-12-20 PROCEDURE — 74011250636 HC RX REV CODE- 250/636: Performed by: INTERNAL MEDICINE

## 2019-12-20 PROCEDURE — 96372 THER/PROPH/DIAG INJ SC/IM: CPT

## 2019-12-20 RX ADMIN — Medication 10 ML: at 05:24

## 2019-12-20 RX ADMIN — ENOXAPARIN SODIUM 40 MG: 40 INJECTION SUBCUTANEOUS at 08:36

## 2019-12-20 RX ADMIN — Medication 10 ML: at 15:57

## 2019-12-20 RX ADMIN — AMLODIPINE BESYLATE 5 MG: 5 TABLET ORAL at 08:36

## 2019-12-20 NOTE — PROGRESS NOTES
[facility]    Medical Progress Note      NAME: Lupillo Carroll   :  1940  MRM:  [id number]    Date/Time: 2019  9:02 AM          Assessment and Plan:     Weak / Debility / Fall - Acute and chronic, related to acute deconditioning following recent hospitalization. Fall precaution. PT and OT evaluation. Needs SNF rehab placement. Consulted case management. Ready today     Leukocytosis - Chronic leukocytosis for a week. No bandemia, no fever. vague findings on single view and PA and lat xary. I suspect atelectasis. Negative procalcitonin, negative RVP and negative PNA labs. No Abx     Bilateral kidney stones / Hydronephrosis - S/p cystoscopy and right ureteral stent placement on last admission. Follow-up with urology upon discharge     HTN (hypertension) - BP low. Stop amlodipine     Dementia / Hard of hearing - Supportive care. Would benefit from outpatient neuropsych testing of official Dx and Rx. Subjective:     Chief Complaint: weak    ROS:  (bold if positive, if negative)    Tolerating some PT  Tolerating Diet        Objective:     Last 24hrs VS reviewed since prior progress note.  Most recent are:    Visit Vitals  /59 (BP 1 Location: Left arm, BP Patient Position: At rest)   Pulse 60   Temp 98.6 °F (37 °C)   Resp 17   Ht 5' (1.524 m)   Wt 49.9 kg (110 lb)   SpO2 98%   Breastfeeding No   BMI 21.48 kg/m²     SpO2 Readings from Last 6 Encounters:   19 98%   19 99%   19 100%   19 99%   10/31/19 99%   10/13/19 98%            Intake/Output Summary (Last 24 hours) at 2019 0902  Last data filed at 2019 0251  Gross per 24 hour   Intake 360 ml   Output    Net 360 ml        Physical Exam:    Gen:  Thin, frail, in no acute distress  HEENT:  Pink conjunctivae, PERRL, hearing intact to voice, moist mucous membranes  Neck:  Supple, without masses, thyroid non-tender  Resp:  No accessory muscle use, clear breath sounds without wheezes rales or rhonchi  Card:  No murmurs, normal S1, S2 without thrills, bruits or peripheral edema  Abd:  Soft, non-tender, non-distended, normoactive bowel sounds are present, no mass  Lymph:  No cervical or inguinal adenopathy  Musc:  No cyanosis or clubbing  Skin:  No rashes or ulcers, skin turgor is good  Neuro:  Cranial nerves are grossly intact, general motor weakness, follows commands   Psych:  Poor insight, oriented to person, place     Telemetry reviewed:   normal sinus rhythm  __________________________________________________________________  Medications Reviewed: (see below)  Medications:     Current Facility-Administered Medications   Medication Dose Route Frequency    amLODIPine (NORVASC) tablet 5 mg  5 mg Oral DAILY    sodium chloride (NS) flush 5-40 mL  5-40 mL IntraVENous Q8H    sodium chloride (NS) flush 5-40 mL  5-40 mL IntraVENous PRN    enoxaparin (LOVENOX) injection 40 mg  40 mg SubCUTAneous Q24H        Lab Data Reviewed: (see below)  Lab Review:     Recent Labs     12/20/19  0207 12/18/19  2332 12/18/19  1004   WBC 15.1* 19.9* 14.4*   HGB 13.0 14.9 14.6   HCT 39.1 44.4 43.8    383 368     Recent Labs     12/20/19  0207 12/18/19  2332 12/18/19  0517    139 140   K 3.9 3.9 3.7   * 107 110*   CO2 27 28 26   * 137* 115*   BUN 9 14 7   CREA 0.45* 0.47* 0.42*   CA 7.9* 8.1* 8.0*   MG  --   --  2.0   ALB  --  2.6*  --    TBILI  --  0.5  --    SGOT  --  18  --    ALT  --  18  --      No results found for: GLUCPOC  No results for input(s): PH, PCO2, PO2, HCO3, FIO2 in the last 72 hours. No results for input(s): INR, INREXT, INREXT in the last 72 hours. All Micro Results     Procedure Component Value Units Date/Time    SPili Torrez Sauger, UR/CSF [[id number]] Collected:  12/19/19 0033    Order Status:  Completed Updated:  12/19/19 2309    LEGIONELLA PNEUMOPHILA AG, URINE [[id number]] Collected:  12/19/19 0033    Order Status:  Completed Specimen:  Urine Updated:  12/19/19 2308    RESPIRATORY Nara Gan [[id number]] Collected:  12/19/19 1231    Order Status:  Completed Specimen:  Nasopharyngeal Updated:  12/19/19 1841     Adenovirus NOT DETECTED        Coronavirus 229E NOT DETECTED        Coronavirus HKU1 NOT DETECTED        Coronavirus CVNL63 NOT DETECTED        Coronavirus OC43 NOT DETECTED        Metapneumovirus NOT DETECTED        Rhinovirus and Enterovirus NOT DETECTED        Influenza A NOT DETECTED        Influenza A, subtype H1 NOT DETECTED        Influenza A, subtype H3 NOT DETECTED        INFLUENZA A H1N1 PCR NOT DETECTED        Influenza B NOT DETECTED        Parainfluenza 1 NOT DETECTED        Parainfluenza 2 NOT DETECTED        Parainfluenza 3 NOT DETECTED        Parainfluenza virus 4 NOT DETECTED        RSV by PCR NOT DETECTED        B. parapertussis, PCR NOT DETECTED        Bordetella pertussis - PCR NOT DETECTED        Chlamydophila pneumoniae DNA, QL, PCR NOT DETECTED        Mycoplasma pneumoniae DNA, QL, PCR NOT DETECTED             Other pertinent lab: none    Total time spent with patient: 45 Minutes  I reviewed chart, notes, data and current medications in the medical record. I have examined and treated the patient at bedside during this period.                  Care Plan discussed with: Patient, Care Manager, Nursing Staff and >50% of time spent in counseling and coordination of care    Discussed:  Care Plan and D/C Planning    Prophylaxis:  H2B/PPI    Disposition:  SNF/LTC           ___________________________________________________    Attending Physician: Karson Andrews MD

## 2019-12-20 NOTE — PROGRESS NOTES
12- CASE MANAGEMENT NOTE:  I have spoken with the patient's aMrino Overton [phone]), several times and told her that [facility] and are aware she may be a long-term resident. The POA prefers [facility] and their liaison was given the In-Pt order from the patient's 12--12- admission and the H&P's from both admissions. Since the patient has a diagnosis of Down's Syndrome, they are asking for a Level II assessment. I also met with the patient's Medicaid Personal Care aide from [address] [place] [address] and spoke with a representative from [address] [place] [address] and they have faxed me the UAI. Per request of [facility] Management (the Level II screening committee) I faxed [phone] the UAI, DMAS and both H&P's to them for a determination on the Level II. CM will continue to follow.  CARLO Morel, CM

## 2019-12-20 NOTE — PROGRESS NOTES
Bedside and Verbal shift change report given to patricia tam  (oncoming nurse) by Valdo Garcia  (offgoing nurse). Report included the following information SBAR and Kardex.

## 2019-12-20 NOTE — PHYSICIAN ADVISORY
Letter of Status Determination:  
Recommend hospitalization status upgraded from OBSERVATION  to INPATIENT  Status Pt Name:  Gib Kehr MR#  
EARNEST # [id number] / 
[id number] Payor: Shekhar Zambrano / Plan: [address] / Product Type: Medicare /   
DURAN#  [id number] Room and Hospital  525/01  @ [facility] Hospitalization date  12/18/2019  9:46 PM  
Current Attending Physician  Chloe Sánchez MD  
Principal diagnosis  Weak [R53.1] 'light-for-dates' infant with signs of fetal malnutrition [P05.00] Weakness [R53.1] Clinicals  67 y.o. y.o  female hospitalized with above diagnosis This pt remains in acute care setting while receiving Rx for weakness, poor balance, instability among other chronic illnesses. She recently went through complex  Urological interventions. Her leukocytosis remained for unclear reasons, for which empiric abx was started Due to appropriate and necessary medical care, this pt's hospitalization has now exceeded two midnights . [place] ([facility]) criteria Does  NOT apply STATUS DETERMINATION  This patient is at above high risk of deterioration based on documented presenting clinical data, comorbid conditions, high risk of adverse events and current acute care course. Ms. Gib Kehr now meets Inpatient Admission status criteria in accordance with CMS regulation Section 43 .3. Specifically, due to medical necessity the patient's stay now exceeds Two Midnights. It is our recommendation that this patient's hospitalization status should be upgraded from  OBSERVATION to INPATIENT status. The final decision of the patient's hospitalization status depends on the attending physician's judgment Additional comments Payor: [employer] MEDICARE / Plan: [address] / Product Type: Medicare / The information in this document is a recommendation to be used for utilization review and utilization management purposes only. This recommendation is not an order. The recommendation is made based on the information reviewed at the time of the referral, is pursuant to the [facility] Conditions of Participation (42 CFR Part 482), and is neither a judgment nor an assessment with regard to the appropriateness or quality of clinical care. For all Managed Care patients: The Criteria are intended solely for use as screening guidelines with respect to the medical appropriateness of healthcare services and not for final clinical or payment determinations concerning the type or level of medical care provided, or proposed to be provided, to a patient. They help the reviewers determine whether a patient is appropriate for observation or inpatient admission at the time a decision to admit the patient is being made. All efforts are made to apply the pertinent payor criteria ([employer] or [employer]) as well as the clinical judgements based on the information reviewed at the time of the referral.\" Nothing in this document may be used to limit, alter, or affect clinical services provided to the patient named below. Fran Ospina MD MPH FACP Cell: [phone] Physician Advisor [address]  
   
Cell  [phone]   
 
 
[phone] Sammi Perez

## 2019-12-20 NOTE — PROGRESS NOTES
Problem: Dysphagia (Adult)  Goal: *Acute Goals and Plan of Care (Insert Text)  Description  Speech pathology goals  Initiated 12/19/2019  1. Patient will tolerate regular/thin liquid diet with no overt s/s aspiration within 7 days -discontinued  2) tolerate dysphagia 2, thins without s/s aspiration or c/o pain with swallowing in chest by 12-23-19   Outcome: Progressing Towards Goal   SPEECH LANGUAGE PATHOLOGY DYSPHAGIA TREATMENT  Patient: Nikolas Powell (20 y.o. female)  Date: 12/20/2019  Diagnosis: Weak [R53.1]  'light-for-dates' infant with signs of fetal malnutrition [P05.00]  Weakness [R53.1]   Weak       Precautions: aspiration Fall, WBAT    ASSESSMENT:  Patient c/o chest pain after eating solids. Possibly esophageal source. PLAN:  Recommendations and Planned Interventions:  Downgrade to dysphagia 2, thins. Consider barium esophagram vs GI consult to r/o esophageal disorder-can be done OP  Patient continues to benefit from skilled intervention to address the above impairments. Continue treatment per established plan of care. Discharge Recommendations:  Skilled Nursing Facility     SUBJECTIVE:   Patient stated Is this part of my medical condition and will I have to keep coming back to the hospital with it? .    OBJECTIVE:   Cognitive and Communication Status:  Neurologic State: Alert  Orientation Level: Oriented to person  Cognition: Decreased command following  Perception: (severely [place])  Perseveration: No perseveration noted  Safety/Judgement: Awareness of environment  Dysphagia Treatment:  Oral Assessment:     P.O. Trials:  Patient Position: up in chair  Vocal quality prior to P.O.: No impairment  Consistency Presented: Solid; Thin liquid  How Presented: Self-fed/presented;Spoon;Straw;Successive swallows   ORAL PHASE:  Bolus Acceptance: No impairment  Bolus Formation/Control: (reduced chew.  suspect she swallowed some solids whole)  Type of Impairment: Mastication  Propulsion: No impairment  Oral Residue: None  PHARYNGEAL PHASE:   Initiation of Swallow: No impairment  Laryngeal Elevation: Functional  Aspiration Signs/Symptoms: None         AFter eating solid foods, she c/o chest pain. \"ow, it hurts. Is this part of my medical diagnosis\"       This occurred multiple times in session. Less with applesauce ?esophageal spasm, transit issues, ? Pain (thrush, irritation)          Exercises:  Laryngeal Exercises:                                                                                                                                   Pain:  Pain Scale 1: Numeric (0 - 10)  Pain Intensity 1: 0       After treatment:   Patient left in no apparent distress sitting up in chair, Call bell within reach, and Nursing notified    COMMUNICATION/EDUCATION:   Patient was educated regarding her deficit(s) of DYSPHAGIA  as this relates to her diagnosis of WEAKNESS. She demonstrated Guarded understanding as evidenced by dementia. The patient's plan of care including recommendations, planned interventions, and recommended diet changes were discussed with: Registered Nurse.     Johan Zamora, SLP  Time Calculation: 20 mins

## 2019-12-20 NOTE — PROGRESS NOTES
Pharmacist Discharge Medication Reconciliation    Discharge Provider:  Dr. Geeta Pittman       Discharge Medications:      My Medications        STOP taking these medications      amLODIPine 5 mg tablet  Commonly known as:  NORVASC        loperamide 2 mg tablet  Commonly known as:  IMMODIUM               Stopping amlodipine (patient never started taking it PTA) d/t low BP     The patient's chart, MAR, and AVS were reviewed by   ADELIA Ogden,   Contact: [phone]

## 2019-12-20 NOTE — PROGRESS NOTES
Bedside shift change report given to Iraj Still RN (oncoming nurse) by Citlaly Fragoso RN (offgoing nurse). Report included the following information SBAR and Kardex.

## 2019-12-20 NOTE — PROGRESS NOTES
12- CASE MANAGEMENT NOTE:  Per request of [place] [facility] (Level II organization), I updated pages 4 and 7 of the patient's UAI to reflect current needs and faxed back to her at [phone]. We will await their response.  CARLO Morel, CM

## 2019-12-20 NOTE — DISCHARGE SUMMARY
Physician Discharge Summary     Patient ID:  Jacki Grdale  [id number]  89 y.o.  1940    Admit date: 12/18/2019    Discharge date and time: 12/21/2019    Admission Diagnoses: Weak [R53.1]  'light-for-dates' infant with signs of fetal malnutrition [P05.00]  Weakness [R53.1]    Discharge Diagnoses:    Principal Diagnosis   Weak                                             Hospital Course and other diagnoses  Weak / Debility / Fall - Acute and chronic, related to acute deconditioning following recent hospitalization.   Fall precaution.  PT and OT evaluation.  Needs SNF rehab placement. Consulted case management. Ready today. Discharged delayed as SNF consideres level 2 eval.  She doesn't need level 2 eval. She was living independently prior to admission.     Bilateral kidney stones / Hydronephrosis - S/p cystoscopy and right ureteral stent placement on last admission.  Follow-up with urology upon discharge     HTN (hypertension) - BP low. Stop amlodipine     Dementia / Hard of hearing - Supportive care. Would benefit from outpatient neuropsych testing of official Dx and Rx. She does not have Downs syndrome     Leukocytosis - Chronic leukocytosis for a week. No bandemia, no fever.   vague findings on single view and PA and lat xary. I suspect atelectasis. Negative procalcitonin, negative RVP and negative PNA labs. No Abx     PCP: Connie Castro MD    Consults: None    Significant Diagnostic Studies: See Hospital Course    Discharged to SNF in improved condition.     Discharge Exam:  /67 (BP 1 Location: Right arm, BP Patient Position: At rest;Lying left side)   Pulse 63   Temp 98.1 °F (36.7 °C)   Resp 16   Ht 5' (1.524 m)   Wt 49.9 kg (110 lb)   SpO2 97%   Breastfeeding No   BMI 21.48 kg/m²      Gen:  Thin, frail, in no acute distress  HEENT:  Pink conjunctivae, PERRL, hearing intact to voice, moist mucous membranes  Neck:  Supple, without masses, thyroid non-tender  Resp:  No accessory muscle use, clear breath sounds without wheezes rales or rhonchi  Card:  No murmurs, normal S1, S2 without thrills, bruits or peripheral edema  Abd:  Soft, non-tender, non-distended, normoactive bowel sounds are present, no mass  Lymph:  No cervical or inguinal adenopathy  Musc:  No cyanosis or clubbing  Skin:  No rashes or ulcers, skin turgor is good  Neuro:  Cranial nerves are grossly intact, general motor weakness, follows commands vaguely  Psych:  Poor insight, oriented to person, place     Patient Instructions:   Current Discharge Medication List      STOP taking these medications       loperamide (IMMODIUM) 2 mg tablet Comments:   Reason for Stopping:         amLODIPine (NORVASC) 5 mg tablet Comments:   Reason for Stopping:         Hydrochlorothiazide (HYDRODIURIL) 12.5 mg tablet Comments:   Reason for Stopping:             Activity: Activity as tolerated and PT/OT Eval and Treat  Diet: Regular Diet and Increase noncaffeinated fluids  Wound Care: None needed    Follow-up with your PCP in a few weeks.   Follow-up tests/labs - none    Signed:  Francisco Javier Suazo MD  12/21/2019  8:40 AM

## 2019-12-21 PROCEDURE — 99218 HC RM OBSERVATION: CPT

## 2019-12-21 PROCEDURE — 96372 THER/PROPH/DIAG INJ SC/IM: CPT

## 2019-12-21 PROCEDURE — 74011250636 HC RX REV CODE- 250/636: Performed by: INTERNAL MEDICINE

## 2019-12-21 RX ADMIN — ENOXAPARIN SODIUM 40 MG: 40 INJECTION SUBCUTANEOUS at 09:27

## 2019-12-21 RX ADMIN — Medication 10 ML: at 06:44

## 2019-12-21 RX ADMIN — Medication 10 ML: at 14:00

## 2019-12-21 RX ADMIN — Medication 10 ML: at 22:00

## 2019-12-21 NOTE — PROGRESS NOTES
12/21/2019  3:29 PM  Level II assessment still pending which is required prior to pt discharging to SNF. CM to continue to monitor.

## 2019-12-21 NOTE — PROGRESS NOTES
[facility]    Medical Progress Note      NAME: Roxie Nolasco   :  1940  MRM:  [id number]    Date/Time: 2019  8:37 AM          Assessment and Plan:     Weak / [place] / Fall - Acute and chronic, related to acute deconditioning following recent hospitalization. Fall precaution. PT and OT evaluation. Needs SNF rehab placement. Consulted case management. Ready today. Discharged delayed as SNF consideres level 2 eval.  She doesn't need level 2 eval. She was living independently prior to admission. Bilateral kidney stones / Hydronephrosis - S/p cystoscopy and right ureteral stent placement on last admission. Follow-up with urology upon discharge     HTN (hypertension) - BP low. Stop amlodipine     Dementia / Hard of hearing - Supportive care. Would benefit from outpatient neuropsych testing of official Dx and Rx. She does not have Downs syndrome    Leukocytosis - Chronic leukocytosis for a week. No bandemia, no fever. vague findings on single view and PA and lat xary. I suspect atelectasis. Negative procalcitonin, negative RVP and negative PNA labs. No Abx        Subjective:     Chief Complaint: weak, still waiting on SNF approval    ROS:  (bold if positive, if negative)    Tolerating some PT  Tolerating Diet        Objective:     Last 24hrs VS reviewed since prior progress note.  Most recent are:    Visit Vitals  /67 (BP 1 Location: Right arm, BP Patient Position: At rest;Lying left side)   Pulse 63   Temp 98.1 °F (36.7 °C)   Resp 16   Ht 5' (1.524 m)   Wt 49.9 kg (110 lb)   SpO2 97%   Breastfeeding No   BMI 21.48 kg/m²     SpO2 Readings from Last 6 Encounters:   19 97%   19 99%   19 100%   19 99%   10/31/19 99%   10/13/19 98%            Intake/Output Summary (Last 24 hours) at 2019 0839  Last data filed at 2019 1045  Gross per 24 hour   Intake 120 ml   Output    Net 120 ml        Physical Exam:    Gen:  Thin, frail, in no acute distress  HEENT:  Pink conjunctivae, PERRL, hearing intact to voice, moist mucous membranes  Neck:  Supple, without masses, thyroid non-tender  Resp:  No accessory muscle use, clear breath sounds without wheezes rales or rhonchi  Card:  No murmurs, normal S1, S2 without thrills, bruits or peripheral edema  Abd:  Soft, non-tender, non-distended, normoactive bowel sounds are present, no mass  Lymph:  No cervical or inguinal adenopathy  Musc:  No cyanosis or clubbing  Skin:  No rashes or ulcers, skin turgor is good  Neuro:  Cranial nerves are grossly intact, general motor weakness, follows commands vaguely  Psych:  Poor insight, oriented to person, place     Telemetry reviewed:   normal sinus rhythm  __________________________________________________________________  Medications Reviewed: (see below)  Medications:     Current Facility-Administered Medications   Medication Dose Route Frequency    amLODIPine (NORVASC) tablet 5 mg  5 mg Oral DAILY    sodium chloride (NS) flush 5-40 mL  5-40 mL IntraVENous Q8H    sodium chloride (NS) flush 5-40 mL  5-40 mL IntraVENous PRN    enoxaparin (LOVENOX) injection 40 mg  40 mg SubCUTAneous Q24H        Lab Data Reviewed: (see below)  Lab Review:     Recent Labs     12/20/19  0207 12/18/19  2332 12/18/19  1004   WBC 15.1* 19.9* 14.4*   HGB 13.0 14.9 14.6   HCT 39.1 44.4 43.8    383 368     Recent Labs     12/20/19  0207 12/18/19  2332    139   K 3.9 3.9   * 107   CO2 27 28   * 137*   BUN 9 14   CREA 0.45* 0.47*   CA 7.9* 8.1*   ALB  --  2.6*   TBILI  --  0.5   SGOT  --  18   ALT  --  18     No results found for: GLUCPOC  No results for input(s): PH, PCO2, PO2, HCO3, FIO2 in the last 72 hours. No results for input(s): INR, INREXT, INREXT in the last 72 hours. All Micro Results     Procedure Component Value Units Date/Time    FEDERICA Alexander, UR/CSF [[id number]] Collected:  12/19/19 0033    Order Status:  Completed Updated:  12/19/19 9154    LEGIONELLA PNEUMOPHILA Teri Holder, URINE [[id number]] Collected:  12/19/19 0033    Order Status:  Completed Specimen:  Urine Updated:  12/19/19 2308    RESPIRATORY PANEL,PCR,NASOPHARYNGEAL [[id number]] Collected:  12/19/19 1231    Order Status:  Completed Specimen:  Nasopharyngeal Updated:  12/19/19 1841     Adenovirus NOT DETECTED        Coronavirus 229E NOT DETECTED        Coronavirus HKU1 NOT DETECTED        Coronavirus CVNL63 NOT DETECTED        Coronavirus OC43 NOT DETECTED        Metapneumovirus NOT DETECTED        Rhinovirus and Enterovirus NOT DETECTED        Influenza A NOT DETECTED        Influenza A, subtype H1 NOT DETECTED        Influenza A, subtype H3 NOT DETECTED        INFLUENZA A H1N1 PCR NOT DETECTED        Influenza B NOT DETECTED        Parainfluenza 1 NOT DETECTED        Parainfluenza 2 NOT DETECTED        Parainfluenza 3 NOT DETECTED        Parainfluenza virus 4 NOT DETECTED        RSV by PCR NOT DETECTED        B. parapertussis, PCR NOT DETECTED        Bordetella pertussis - PCR NOT DETECTED        Chlamydophila pneumoniae DNA, QL, PCR NOT DETECTED        Mycoplasma pneumoniae DNA, QL, PCR NOT DETECTED             Other pertinent lab: none    Total time spent with patient: 25 Minutes  I reviewed chart, notes, data and current medications in the medical record. I have examined and treated the patient at bedside during this period.                  Care Plan discussed with: Patient, Care Manager, Nursing Staff and >50% of time spent in counseling and coordination of care    Discussed:  Care Plan and D/C Planning    Prophylaxis:  H2B/PPI    Disposition:  SNF/LTC           ___________________________________________________    Attending Physician: Gorge Moreno MD

## 2019-12-21 NOTE — PROGRESS NOTES
Bedside and Verbal shift change report given to [address] Fer[address] (oncoming nurse) by Meka Sims RN (offgoing nurse). Report included the following information SBAR, Kardex, MAR, Accordion and Recent Results.

## 2019-12-21 NOTE — PROGRESS NOTES
8125  Bedside and Verbal shift change report given to Donnie Manning RN (oncoming nurse) by Suhail Mcnamara RN (offgoing nurse). Report included the following information SBAR, Kardex, Intake/Output, MAR and Recent Results.

## 2019-12-22 PROCEDURE — 74011250636 HC RX REV CODE- 250/636: Performed by: INTERNAL MEDICINE

## 2019-12-22 PROCEDURE — 99218 HC RM OBSERVATION: CPT

## 2019-12-22 PROCEDURE — 96372 THER/PROPH/DIAG INJ SC/IM: CPT

## 2019-12-22 RX ADMIN — ENOXAPARIN SODIUM 40 MG: 40 INJECTION SUBCUTANEOUS at 08:52

## 2019-12-22 RX ADMIN — Medication 10 ML: at 08:53

## 2019-12-22 RX ADMIN — Medication 10 ML: at 04:00

## 2019-12-22 RX ADMIN — Medication 10 ML: at 22:41

## 2019-12-22 RX ADMIN — Medication 10 ML: at 14:00

## 2019-12-22 NOTE — PROGRESS NOTES
Bedside shift change report given to Sue Andrews RN (oncoming nurse) by Pete Persaud RN (offgoing nurse). Report included the following information SBAR, Kardex, Procedure Summary, Intake/Output, MAR and Recent Results.

## 2019-12-22 NOTE — PROGRESS NOTES
Patient stated she was dizzy. RN sent message to MD to make her aware. RN will continue to monitor. Bedside shift change report given to Andre Uribe RN (oncoming nurse) by KAHLIL Anaya (offgoing nurse). Report included the following information SBAR, Kardex, Procedure Summary, Intake/Output, MAR and Recent Results.

## 2019-12-22 NOTE — PROGRESS NOTES
[facility]    Medical Progress Note      NAME: Charlette Doyle   :  1940  MRM:  [id number]    Date/Time: 2019        Assessment and Plan:     Weak / [place] / Fall - Acute and chronic, related to acute deconditioning following recent hospitalization. Fall precaution. PT and OT evaluation. Needs SNF rehab placement. Consulted case management. Leukocytosis - Chronic leukocytosis for a week. No bandemia, no fever. vague findings on single view and PA and lat xary. I suspect atelectasis. Negative procalcitonin, negative RVP and negative PNA labs. No Abx     Bilateral kidney stones / Hydronephrosis - S/p cystoscopy and right ureteral stent placement on last admission. Follow-up with urology upon discharge     HTN (hypertension) - BP low. Stop amlodipine     Dementia / Hard of hearing - Supportive care. Would benefit from outpatient neuropsych testing of official Dx and Rx. Subjective:     Chief Complaint: weak    ROS:  (bold if positive, if negative)    Tolerating some PT  Tolerating Diet        Objective:     Last 24hrs VS reviewed since prior progress note.  Most recent are:    Visit Vitals  /54 (BP 1 Location: Left arm, BP Patient Position: At rest)   Pulse 71   Temp 97.8 °F (36.6 °C)   Resp 16   Ht 5' (1.524 m)   Wt 49.9 kg (110 lb)   SpO2 94%   Breastfeeding No   BMI 21.48 kg/m²     SpO2 Readings from Last 6 Encounters:   19 94%   19 99%   19 100%   19 99%   10/31/19 99%   10/13/19 98%          No intake or output data in the 24 hours ending 19 1347     Physical Exam:    Gen:  Thin, frail, in no acute distress  HEENT:  Pink conjunctivae, PERRL, hearing intact to voice, moist mucous membranes  Neck:  Supple, without masses, thyroid non-tender  Resp:  No accessory muscle use, clear breath sounds without wheezes rales or rhonchi  Card:  No murmurs, normal S1, S2 without thrills, bruits or peripheral edema  Abd:  Soft, non-tender, non-distended, normoactive bowel sounds are present, no mass  Lymph:  No cervical or inguinal adenopathy  Musc:  No cyanosis or clubbing  Skin:  No rashes or ulcers, skin turgor is good  Neuro:  Cranial nerves are grossly intact, general motor weakness, follows commands   Psych:  Poor insight, oriented to person, place     Telemetry reviewed:   normal sinus rhythm  __________________________________________________________________  Medications Reviewed: (see below)  Medications:     Current Facility-Administered Medications   Medication Dose Route Frequency    sodium chloride (NS) flush 5-40 mL  5-40 mL IntraVENous Q8H    sodium chloride (NS) flush 5-40 mL  5-40 mL IntraVENous PRN    enoxaparin (LOVENOX) injection 40 mg  40 mg SubCUTAneous Q24H        Lab Data Reviewed: (see below)  Lab Review:     Recent Labs     12/20/19 0207   WBC 15.1*   HGB 13.0   HCT 39.1        Recent Labs     12/20/19 0207      K 3.9   *   CO2 27   *   BUN 9   CREA 0.45*   CA 7.9*     No results found for: GLUCPOC  No results for input(s): PH, PCO2, PO2, HCO3, FIO2 in the last 72 hours. No results for input(s): INR, INREXT, INREXT in the last 72 hours. All Micro Results     Procedure Component Value Units Date/Time    FEDERICA Espino, UR/CSF [[id number]] Collected:  12/19/19 0033    Order Status:  Completed Updated:  12/19/19 2309    LEGIONELLA PNEUMOPHILA AG, URINE [[id number]] Collected:  12/19/19 0033    Order Status:  Completed Specimen:  Urine Updated:  12/19/19 2308    RESPIRATORY PANEL,PCR,NASOPHARYNGEAL [[id number]] Collected:  12/19/19 1231    Order Status:  Completed Specimen:  Nasopharyngeal Updated:  12/19/19 1841     Adenovirus NOT DETECTED        Coronavirus 229E NOT DETECTED        Coronavirus HKU1 NOT DETECTED        Coronavirus CVNL63 NOT DETECTED        Coronavirus OC43 NOT DETECTED        Metapneumovirus NOT DETECTED        Rhinovirus and Enterovirus NOT DETECTED        Influenza A NOT DETECTED Influenza A, subtype H1 NOT DETECTED        Influenza A, subtype H3 NOT DETECTED        INFLUENZA A H1N1 PCR NOT DETECTED        Influenza B NOT DETECTED        Parainfluenza 1 NOT DETECTED        Parainfluenza 2 NOT DETECTED        Parainfluenza 3 NOT DETECTED        Parainfluenza virus 4 NOT DETECTED        RSV by PCR NOT DETECTED        B. parapertussis, PCR NOT DETECTED        Bordetella pertussis - PCR NOT DETECTED        Chlamydophila pneumoniae DNA, QL, PCR NOT DETECTED        Mycoplasma pneumoniae DNA, QL, PCR NOT DETECTED             Other pertinent lab: none    Total time spent with patient: 30 Minutes  I reviewed chart, notes, data and current medications in the medical record. I have examined and treated the patient at bedside during this period.                  Care Plan discussed with: Patient, Care Manager, Nursing Staff and >50% of time spent in counseling and coordination of care    Discussed:  Care Plan and D/C Planning    Prophylaxis:  H2B/PPI    Disposition:  SNF/LTC           ___________________________________________________    Attending Physician: Thierno Alonso MD

## 2019-12-23 LAB
L PNEUMO1 AG UR QL IA: NEGATIVE
SPECIMEN SOURCE: NORMAL

## 2019-12-23 PROCEDURE — 74011250636 HC RX REV CODE- 250/636: Performed by: INTERNAL MEDICINE

## 2019-12-23 PROCEDURE — 92526 ORAL FUNCTION THERAPY: CPT

## 2019-12-23 PROCEDURE — 96372 THER/PROPH/DIAG INJ SC/IM: CPT

## 2019-12-23 PROCEDURE — 99218 HC RM OBSERVATION: CPT

## 2019-12-23 RX ADMIN — Medication 10 ML: at 22:10

## 2019-12-23 RX ADMIN — Medication 10 ML: at 05:51

## 2019-12-23 RX ADMIN — Medication 10 ML: at 14:00

## 2019-12-23 RX ADMIN — ENOXAPARIN SODIUM 40 MG: 40 INJECTION SUBCUTANEOUS at 08:49

## 2019-12-23 NOTE — PROGRESS NOTES
[facility]    Medical Progress Note      NAME: Michelle Dunn   :  1940  MRM:  [id number]    Date/Time: 2019        Assessment and Plan:     Weak / [place] / Fall - Acute and chronic, related to acute deconditioning following recent hospitalization. Fall precaution. PT and OT evaluation. Needs SNF rehab placement. Consulted case management. Leukocytosis - Chronic leukocytosis for a week. No bandemia, no fever. vague findings on single view and PA and lat xary. I suspect atelectasis. Negative procalcitonin, negative RVP and negative PNA labs. No Abx     Bilateral kidney stones / Hydronephrosis - S/p cystoscopy and right ureteral stent placement on last admission. Follow-up with urology upon discharge     HTN (hypertension) - BP low. Stop amlodipine     Dementia / Hard of hearing - Supportive care. Would benefit from outpatient neuropsych testing of official Dx and Rx. She does not have a dx of down syndrome       Subjective:     Chief Complaint: weak    ROS:  (bold if positive, if negative)    Tolerating some PT  Tolerating Diet        Objective:     Last 24hrs VS reviewed since prior progress note.  Most recent are:    Visit Vitals  /75 (BP 1 Location: Left arm, BP Patient Position: Head of bed elevated (Comment degrees))   Pulse 63   Temp 98.1 °F (36.7 °C)   Resp 16   Ht 5' (1.524 m)   Wt 49.9 kg (110 lb)   SpO2 95%   Breastfeeding No   BMI 21.48 kg/m²     SpO2 Readings from Last 6 Encounters:   19 95%   19 99%   19 100%   19 99%   10/31/19 99%   10/13/19 98%            Intake/Output Summary (Last 24 hours) at 2019 1404  Last data filed at 2019 0646  Gross per 24 hour   Intake 120 ml   Output    Net 120 ml        Physical Exam:    Gen:  Thin, frail, in no acute distress  HEENT:  Pink conjunctivae, PERRL, hearing intact to voice, moist mucous membranes  Neck:  Supple, without masses, thyroid non-tender  Resp:  No accessory muscle use, clear breath sounds without wheezes rales or rhonchi  Card:  No murmurs, normal S1, S2 without thrills, bruits or peripheral edema  Abd:  Soft, non-tender, non-distended, normoactive bowel sounds are present, no mass  Lymph:  No cervical or inguinal adenopathy  Musc:  No cyanosis or clubbing  Skin:  No rashes or ulcers, skin turgor is good  Neuro:  Cranial nerves are grossly intact, general motor weakness, follows commands   Psych:  Poor insight, oriented to person, place     Telemetry reviewed:   normal sinus rhythm  __________________________________________________________________  Medications Reviewed: (see below)  Medications:     Current Facility-Administered Medications   Medication Dose Route Frequency    sodium chloride (NS) flush 5-40 mL  5-40 mL IntraVENous Q8H    sodium chloride (NS) flush 5-40 mL  5-40 mL IntraVENous PRN    enoxaparin (LOVENOX) injection 40 mg  40 mg SubCUTAneous Q24H        Lab Data Reviewed: (see below)  Lab Review:     No results for input(s): WBC, HGB, HCT, PLT, HGBEXT, HCTEXT, PLTEXT, HGBEXT, HCTEXT, PLTEXT in the last 72 hours. No results for input(s): NA, K, CL, CO2, GLU, BUN, CREA, CA, MG, PHOS, ALB, TBIL, TBILI, SGOT, ALT, INR, INREXT, INREXT in the last 72 hours. No results found for: GLUCPOC  No results for input(s): PH, PCO2, PO2, HCO3, FIO2 in the last 72 hours. No results for input(s): INR, INREXT, INREXT in the last 72 hours. All Micro Results     Procedure Component Value Units Date/Time    FEDERICA Issa, UR/CSF [[id number]] Collected:  12/19/19 0033    Order Status:  Completed Updated:  12/19/19 2309    LEGIONELLA PNEUMOPHILA AG, URINE [[id number]] Collected:  12/19/19 0033    Order Status:  Completed Specimen:  Urine Updated:  12/19/19 2308    RESPIRATORY PANEL,PCR,NASOPHARYNGEAL [[id number]] Collected:  12/19/19 1231    Order Status:  Completed Specimen:  Nasopharyngeal Updated:  12/19/19 1841     Adenovirus NOT DETECTED        Coronavirus 229E NOT DETECTED        Coronavirus HKU1 NOT DETECTED        Coronavirus CVNL63 NOT DETECTED        Coronavirus OC43 NOT DETECTED        Metapneumovirus NOT DETECTED        Rhinovirus and Enterovirus NOT DETECTED        Influenza A NOT DETECTED        Influenza A, subtype H1 NOT DETECTED        Influenza A, subtype H3 NOT DETECTED        INFLUENZA A H1N1 PCR NOT DETECTED        Influenza B NOT DETECTED        Parainfluenza 1 NOT DETECTED        Parainfluenza 2 NOT DETECTED        Parainfluenza 3 NOT DETECTED        Parainfluenza virus 4 NOT DETECTED        RSV by PCR NOT DETECTED        B. parapertussis, PCR NOT DETECTED        Bordetella pertussis - PCR NOT DETECTED        Chlamydophila pneumoniae DNA, QL, PCR NOT DETECTED        Mycoplasma pneumoniae DNA, QL, PCR NOT DETECTED             Other pertinent lab: none    Total time spent with patient: 30 Minutes  I reviewed chart, notes, data and current medications in the medical record. I have examined and treated the patient at bedside during this period.                  Care Plan discussed with: Patient, Care Manager, Nursing Staff and >50% of time spent in counseling and coordination of care    Discussed:  Care Plan and D/C Planning    Prophylaxis:  H2B/PPI    Disposition:  SNF/LTC           ___________________________________________________    Attending Physician: Viktoria Cook MD

## 2019-12-23 NOTE — PROGRESS NOTES
1930: Bedside and Verbal shift change report given to Wilfrido Serna RN (oncoming nurse) by Tari Camilo RN (offgoing nurse). Report included the following information SBAR, Kardex, MAR, and Accordion. 0730: Bedside and Verbal shift change report given to Dax Painting RN (oncoming nurse) by Wilfrido Serna RN (offgoing nurse). Report included the following information SBAR, Kardex, MAR and Accordion. Problem: Pressure Injury - Risk of  Goal: *Prevention of pressure injury  Description  Document David Scale and appropriate interventions in the flowsheet.   Outcome: Progressing Towards Goal  Note: Pressure Injury Interventions:  Sensory Interventions: Chair cushion, Assess changes in LOC, Float heels, Keep linens dry and wrinkle-free    Moisture Interventions: Absorbent underpads, Check for incontinence Q2 hours and as needed, Minimize layers    Activity Interventions: Chair cushion, Increase time out of bed    Mobility Interventions: Chair cushion, Float heels, HOB 30 degrees or less    Nutrition Interventions: Document food/fluid/supplement intake    Friction and Shear Interventions: Lift sheet, HOB 30 degrees or less, Minimize layers

## 2019-12-23 NOTE — PROGRESS NOTES
Bedside shift change report given to Laurel (oncoming nurse) by Beatrice Kilgore (offgoing nurse). Report included the following information SBAR, Kardex, Intake/Output and Recent Results. Bedside shift change report given to Beatrice Kilgore (oncoming nurse) by Clifford Lopez (offgoing nurse). Report included the following information SBAR, Kardex, Intake/Output and Recent Results.

## 2019-12-23 NOTE — PROGRESS NOTES
12- CASE MANAGEMENT NOTE:  I have attempted to reach someone at [facility] (Level II organization) to determine the status of the possible need for a Level II assessment. I continue to await their response.  SABRINA MorelW, CM

## 2019-12-23 NOTE — PROGRESS NOTES
Interdisciplinary team rounds were held 12/23/2019 with the following team members:Care Management, Nursing, Nutrition, Pharmacy, Physical Therapy and Physician . Plan of care discussed. See clinical pathway and/or care plan for interventions and desired outcomes.     Plan: awaiting placement

## 2019-12-23 NOTE — PROGRESS NOTES
Problem: Dysphagia (Adult)  Goal: *Acute Goals and Plan of Care (Insert Text)  Description  Speech pathology goals  Initiated 12/19/2019  1. Patient will tolerate regular/thin liquid diet with no overt s/s aspiration within 7 days -discontinued  2) tolerate dysphagia 2, thins without s/s aspiration or c/o pain with swallowing in chest by 12-23-19 ; continue to 12-26-19   Outcome: Progressing Towards Goal   SPEECH LANGUAGE PATHOLOGY DYSPHAGIA TREATMENT  Patient: Gabriela Rai (44 y.o. female)  Date: 12/23/2019  Diagnosis: Weak [R53.1]  'light-for-dates' infant with signs of fetal malnutrition [P05.00]  Weakness [R53.1]   Weak       Precautions: aspiration Fall, WBAT    ASSESSMENT:  Patient appears to have less chest pain with dysphagia 2 (minced) foods and thin liquids. PLAN:  Recommendations and Planned Interventions: continue:  -Dysphagia 2 / Thin liquid diet.   -Upright 90 degrees. -will need this diet at discharge  Patient continues to benefit from skilled intervention to address the above impairments. Continue treatment per established plan of care. Discharge Recommendations:  Skilled Nursing Facility     SUBJECTIVE:   Patient stated oh sh---. When she dropped some food off her tray  OBJECTIVE:   Cognitive and Communication Status:  Neurologic State: Alert  Orientation Level: Oriented to person, Oriented to place, Disoriented to situation, Disoriented to time  Cognition: Decreased command following, Poor safety awareness  Perception: (severely [place])  Perseveration: No perseveration noted  Safety/Judgement: Awareness of environment  Dysphagia Treatment:  Oral Assessment:     P.O.  Trials:  Patient Position: up in bed  Vocal quality prior to P.O.: No impairment  Consistency Presented: (Dysphagia 2, thins-seen at end of breakfast)  How Presented: Self-fed/presented;Spoon;Straw   ORAL PHASE:   Bolus Acceptance: No impairment  Bolus Formation/Control: No impairment     Propulsion: No impairment  Oral Residue: None  PHARYNGEAL PHASE:  Initiation of Swallow: No impairment  Laryngeal Elevation: Functional  Aspiration Signs/Symptoms: None  Pharyngeal Phase Characteristics: No impairment, issues, or problems   Effective Modifications: None        No patient c/o chest pain with eating. RN reports no issues with pills        Exercises:  Laryngeal Exercises:                                                                                                                                   Pain:  Pain Scale 1: Numeric (0 - 10)  Pain Intensity 1: 0       After treatment:   Patient left in no apparent distress in bed and Nursing notified    COMMUNICATION/EDUCATION:   Patient was educated regarding her deficit(s) of dysphagia as this relates to her diagnosis of weakness . She demonstrated Guarded understanding as evidenced by severe [place] , dementia. The patient's plan of care including recommendations, planned interventions, and recommended diet changes were discussed with: Registered Nurse.     JOSE A Gil  Time Calculation: 15 mins

## 2019-12-24 LAB
CREAT SERPL-MCNC: 0.41 MG/DL (ref 0.55–1.02)
ERYTHROCYTE [DISTWIDTH] IN BLOOD BY AUTOMATED COUNT: 12.3 % (ref 11.5–14.5)
FLUID CULTURE, SPNG2: NORMAL
HCT VFR BLD AUTO: 36.6 % (ref 35–47)
HGB BLD-MCNC: 12.1 G/DL (ref 11.5–16)
MCH RBC QN AUTO: 30.6 PG (ref 26–34)
MCHC RBC AUTO-ENTMCNC: 33.1 G/DL (ref 30–36.5)
MCV RBC AUTO: 92.7 FL (ref 80–99)
NRBC # BLD: 0 K/UL (ref 0–0.01)
NRBC BLD-RTO: 0 PER 100 WBC
ORGANISM ID, SPNG3: NORMAL
PLATELET # BLD AUTO: 330 K/UL (ref 150–400)
PLEASE NOTE, SPNG4: NORMAL
PMV BLD AUTO: 10.2 FL (ref 8.9–12.9)
RBC # BLD AUTO: 3.95 M/UL (ref 3.8–5.2)
S PNEUM AG SPEC QL LA: NEGATIVE
SPECIMEN SOURCE: NORMAL
SPECIMEN, SPNG1: NORMAL
WBC # BLD AUTO: 9.4 K/UL (ref 3.6–11)

## 2019-12-24 PROCEDURE — 99218 HC RM OBSERVATION: CPT

## 2019-12-24 PROCEDURE — 74011250636 HC RX REV CODE- 250/636: Performed by: INTERNAL MEDICINE

## 2019-12-24 PROCEDURE — 36415 COLL VENOUS BLD VENIPUNCTURE: CPT

## 2019-12-24 PROCEDURE — 97535 SELF CARE MNGMENT TRAINING: CPT

## 2019-12-24 PROCEDURE — 97530 THERAPEUTIC ACTIVITIES: CPT

## 2019-12-24 PROCEDURE — 85027 COMPLETE CBC AUTOMATED: CPT

## 2019-12-24 PROCEDURE — 97116 GAIT TRAINING THERAPY: CPT

## 2019-12-24 PROCEDURE — 93005 ELECTROCARDIOGRAM TRACING: CPT

## 2019-12-24 PROCEDURE — 96372 THER/PROPH/DIAG INJ SC/IM: CPT

## 2019-12-24 PROCEDURE — 82565 ASSAY OF CREATININE: CPT

## 2019-12-24 RX ADMIN — ENOXAPARIN SODIUM 40 MG: 40 INJECTION SUBCUTANEOUS at 09:12

## 2019-12-24 RX ADMIN — Medication 10 ML: at 14:00

## 2019-12-24 RX ADMIN — Medication 10 ML: at 05:29

## 2019-12-24 RX ADMIN — Medication 10 ML: at 22:07

## 2019-12-24 NOTE — PROGRESS NOTES
Nutrition Assessment:    RECOMMENDATIONS/INTERVENTION(S):   1. Continue with Mechanical Soft diet order. Will continue to monitor PO intakes, weight, labs. ASSESSMENT:   12/24: 66 yo female admitted for weakness. RD assessment for low BMI. PMhx: HTN, chronic diarrhea, dementia. Underweight per BMI per advanced age. Weight hx in EMR indicates weight has been stable PTA. Pt working with PT at time of visit, no family in room. Unable to obtain nutrition hx. Mechanical Soft diet ordered per SLP. PO intakes documented as % most meals since admission. Labs and meds reviewed. No open wounds. Diet Order: Mechanical soft  % Eaten:    Patient Vitals for the past 72 hrs:   % Diet Eaten   12/24/19 1057 100 %   12/22/19 0931 40 %       Pertinent Medications: [x] Reviewed    Labs: [x] Reviewed    Anthropometrics: Height: 5' (152.4 cm) Weight: 49.9 kg (110 lb)    IBW (%IBW):   ( ) UBW (%UBW):   (  %)      BMI: Body mass index is 21.48 kg/m². This BMI is indicative of:   [x] Underweight   - per age  [] Normal    [] Overweight    []  Obesity    []  Extreme Obesity (BMI>40)  Estimated Nutrition Needs (Based on): 3075 Kcals/day( x AF 1.3 + 250) , 60 g(60-70gm (1.2-1.4gm/kg/d)) Protein  Carbohydrate:  At Least 130 g/day  Fluids: 1419mL/day (1 ml/kcal)    Last BM: 12/23   [x]Active     []Hyperactive  []Hypoactive       [] Absent   BS  Skin:    [] Intact   [] Incision  [] Breakdown   [] DTI   [] Tears/Excoriation/Abrasion  []Edema [x] Other: erythema to heels     Wt Readings from Last 30 Encounters:   12/18/19 49.9 kg (110 lb)   12/16/19 50 kg (110 lb 3.7 oz)   12/08/19 49.9 kg (110 lb)   10/31/19 49.9 kg (110 lb)   10/13/19 49.9 kg (110 lb)   10/19/15 48.8 kg (107 lb 8 oz)   04/22/14 59.4 kg (131 lb)      NUTRITION DIAGNOSES:   Problem:  Underweight     Etiology: related to decreased ability to consume sufficient energy to maintain appropriate weight     Signs/Symptoms: as evidenced by BMI 21.5 (age > 65 years)      NUTRITION INTERVENTIONS:  Meals/Snacks: General/healthful diet                  GOAL:   PO intakes > 75% all meals to promote weight gain of 0.5-1lb within next 5-7 days    Cultural, Christian, or Ethnic Dietary Needs: None     EDUCATION & DISCHARGE NEEDS:    [x] None Identified   [] Identified and Education Provided/Documented   [] Identified and Pt declined/was not appropriate      [x] Interdisciplinary Care Plan Reviewed/Documented    [x] Discharge Needs:   Mechanical Soft diet   [] No Nutrition Related Discharge Needs    NUTRITION RISK:   Pt Is At Nutrition Risk  [x]     No Nutrition Risk Identified  []       PT SEEN FOR:    []  MD Consult: []Calorie Count      []Diabetic Diet Education        []Diet Education     []Electrolyte Management     []General Nutrition Management and Supplements     []Management of Tube Feeding     []TPN Recommendations    []  RN Referral:  []MST score >=2     []Enteral/Parenteral Nutrition PTA     []Pregnant: Gestational DM or Multigestation                 [] Pressure Ulcer    [x]  Low BMI      []  Length of Stay       [] Dysphagia Diet         [] Ventilator  []  Follow-up     Previous Recommendations:   [] Implemented          [] Not Implemented          [x] Not Applicable    Previous Goal:   [] Met              [] Progressing Towards Goal              [] Not Progressing Towards Goal   [x] Not Applicable            Sujey Michael, [address]  Pager [phone]  Phone [phone]

## 2019-12-24 NOTE — PROGRESS NOTES
PHYSICAL THERAPY:Pt declined mobilizing out of bed with PT after much encouragement from visitor and PT. PT will continue to follow.

## 2019-12-24 NOTE — PROGRESS NOTES
[facility]    Medical Progress Note      NAME: Gib Kehr   :  1940  MRM:  [id number]    Date/Time: 2019        Assessment and Plan:     Weak / Lianne Herd / Fall - Acute and chronic, related to acute deconditioning following recent hospitalization. Fall precaution. PT and OT evaluation. Needs SNF rehab placement. Consulted case management. Leukocytosis - Chronic leukocytosis for a week. No bandemia, no fever. vague findings on single view and PA and lat xary. I suspect atelectasis. Negative procalcitonin, negative RVP and negative PNA labs. No Abx     Bilateral kidney stones / Hydronephrosis - S/p cystoscopy and right ureteral stent placement on last admission. Follow-up with urology upon discharge     HTN (hypertension) - BP labile. norvasc stopped due to low bp. Will continue to hold for now     Dementia / Hard of hearing - Supportive care. Would benefit from outpatient neuropsych testing of official Dx and Rx. She does not have a dx of down syndrome       Subjective:     Chief Complaint: weak    ROS:  (bold if positive, if negative)    Tolerating some PT  Tolerating Diet        Objective:     Last 24hrs VS reviewed since prior progress note. Most recent are:    Visit Vitals  /63 (BP 1 Location: Right arm, BP Patient Position: At rest)   Pulse 60   Temp 97.2 °F (36.2 °C)   Resp 16   Ht 5' (1.524 m)   Wt 49.9 kg (110 lb)   SpO2 99%   Breastfeeding No   BMI 21.48 kg/m²     SpO2 Readings from Last 6 Encounters:   19 99%   19 99%   19 100%   19 99%   10/31/19 99%   10/13/19 98%          No intake or output data in the 24 hours ending 19 0957     Physical Exam:    Gen:  Thin, frail, in no acute distress  HEENT:  Pink conjunctivae, PERRL, hearing intact to voice, moist mucous membranes  Neck:  Supple, without masses, thyroid non-tender  Resp:  No accessory muscle use, clear breath sounds without wheezes rales or rhonchi  Card:   No murmurs, normal S1, S2 without thrills, bruits or peripheral edema  Abd:  Soft, non-tender, non-distended, normoactive bowel sounds are present, no mass  Lymph:  No cervical or inguinal adenopathy  Musc:  No cyanosis or clubbing  Skin:  No rashes or ulcers, skin turgor is good  Neuro:  Cranial nerves are grossly intact, general motor weakness, follows commands   Psych:  Poor insight, oriented to person, place     Telemetry reviewed:   normal sinus rhythm  __________________________________________________________________  Medications Reviewed: (see below)  Medications:     Current Facility-Administered Medications   Medication Dose Route Frequency    sodium chloride (NS) flush 5-40 mL  5-40 mL IntraVENous Q8H    sodium chloride (NS) flush 5-40 mL  5-40 mL IntraVENous PRN    enoxaparin (LOVENOX) injection 40 mg  40 mg SubCUTAneous Q24H        Lab Data Reviewed: (see below)  Lab Review:     Recent Labs     12/24/19  0425   WBC 9.4   HGB 12.1   HCT 36.6        Recent Labs     12/24/19 0425   CREA 0.41*     No results found for: GLUCPOC  No results for input(s): PH, PCO2, PO2, HCO3, FIO2 in the last 72 hours. No results for input(s): INR, INREXT, INREXT in the last 72 hours. All Micro Results     Procedure Component Value Units Date/Time    DAVE Burger URINE [[id number]] Collected:  12/19/19 0033    Order Status:  Completed Specimen:  Urine Updated:  12/23/19 1436     Source URINE        L pneumophila S1 Ag, urine NEGATIVE         Comment: (NOTE)  Presumptive negative for L. pneumophila serogroup 1 antigen in urine,  suggesting no recent or current infection. Legionnaires' disease  cannot be ruled out since other serogroups and species may also  cause disease. Performed At: [address] [id number]  Kamille Burrell MD DD:[id number]         FEDERICA Berry, UR/CSF [[id number]] Collected:  12/19/19 0033    Order Status:  Completed Updated:  12/19/19 3526 RESPIRATORY PANEL,PCR,NASOPHARYNGEAL [[id number]] Collected:  12/19/19 1231    Order Status:  Completed Specimen:  Nasopharyngeal Updated:  12/19/19 1841     Adenovirus NOT DETECTED        Coronavirus 229E NOT DETECTED        Coronavirus HKU1 NOT DETECTED        Coronavirus CVNL63 NOT DETECTED        Coronavirus OC43 NOT DETECTED        Metapneumovirus NOT DETECTED        Rhinovirus and Enterovirus NOT DETECTED        Influenza A NOT DETECTED        Influenza A, subtype H1 NOT DETECTED        Influenza A, subtype H3 NOT DETECTED        INFLUENZA A H1N1 PCR NOT DETECTED        Influenza B NOT DETECTED        Parainfluenza 1 NOT DETECTED        Parainfluenza 2 NOT DETECTED        Parainfluenza 3 NOT DETECTED        Parainfluenza virus 4 NOT DETECTED        RSV by PCR NOT DETECTED        B. parapertussis, PCR NOT DETECTED        Bordetella pertussis - PCR NOT DETECTED        Chlamydophila pneumoniae DNA, QL, PCR NOT DETECTED        Mycoplasma pneumoniae DNA, QL, PCR NOT DETECTED             Other pertinent lab: none    Total time spent with patient: 15 Minutes      I reviewed chart, notes, data and current medications in the medical record. I have examined and treated the patient at bedside during this period.                  Care Plan discussed with: Patient, Care Manager, Nursing Staff and >50% of time spent in counseling and coordination of care    Discussed:  Care Plan and D/C Planning    Prophylaxis:  H2B/PPI    Disposition:  SNF/LTC           ___________________________________________________    Attending Physician: Bridger Scherer MD

## 2019-12-24 NOTE — PROGRESS NOTES
Problem: Self Care Deficits Care Plan (Adult)  Goal: *Acute Goals and Plan of Care (Insert Text)  Description    FUNCTIONAL STATUS PRIOR TO ADMISSION: Patient was supervision for basic and instrumental ADLs. HOME SUPPORT: The patient lived alone with caregiver intermittently to provide assistance. Occupational Therapy Goals  Initiated 12/19/2019  1. Patient will perform lower body dressing with supervision/set-up within 7 day(s). 2.  Patient will perform grooming, standing at sink, with supervision/set-up within 7 day(s). 3.  Patient will perform toilet transfers with supervision/set-up within 7 day(s). 4.  Patient will perform all aspects of toileting with supervision/set-up within 7 day(s). 5.  Patient will participate in upper extremity therapeutic exercise/activities with supervision/set-up for 5 minutes within 7 day(s). Outcome: Progressing Towards Goal   OCCUPATIONAL THERAPY TREATMENT  Patient: Marie Zamudio (47 y.o. female)  Date: 12/24/2019  Diagnosis: Weak [R53.1]  'light-for-dates' infant with signs of fetal malnutrition [P05.00]  Weakness [R53.1]   Weak       Precautions: Fall, WBAT  Chart, occupational therapy assessment, plan of care, and goals were reviewed. ASSESSMENT  Patient continues with skilled OT services and is progressing towards goals. Patient received supine in bed, family and caregiver initially present but left shortly after therapy arrival. Patient agreeable to activity but with initiation of mobility, she becomes very fearful and resistant to movement. Patient mod/max assist for bed mobility and noted with posterior lean, flailing arm/legs in fear. Patient very [address] and hard to calm with voice. Able to redirect with showing patient shoes and she is willing to don. Unable to complete without max assist.  Patient transfers with assist x 2 - mod/max assist.  Patient able to sit in  chair but very unsafe.   She is not agreeable to tray table near, or LEs elevated in recliner chair. Patient unsafe to leave in chair this date. Transfer back to chair. Patient left in NAD in bed, calm. Patient fear, impeding activity today. Current Level of Function Impacting Discharge (ADLs): mod/max assist x 2, varying level depending on fear    Other factors to consider for discharge: lives alone         PLAN :  Patient continues to benefit from skilled intervention to address the above impairments. Continue treatment per established plan of care. to address goals. Recommend with staff: Bianca Hong  as able    Recommend next OT session: progression of goals    Recommendation for discharge: (in order for the patient to meet his/her long term goals)  Therapy up to 5 days/week in SNF setting    This discharge recommendation:  Has been made in collaboration with the attending provider and/or case management    IF patient discharges home will need the following DME: to be determined       SUBJECTIVE:   Patient stated I dont want to fall! Anatoliy Corcoran    OBJECTIVE DATA SUMMARY:   Cognitive/Behavioral Status:  Neurologic State: Alert  Orientation Level: Oriented to person;Disoriented to time;Disoriented to situation;Disoriented to place  Cognition: Decreased command following(fear of falling )  Perception: Appears intact  Perseveration: No perseveration noted  Safety/Judgement: Decreased awareness of need for safety;Decreased awareness of need for assistance    Functional Mobility and Transfers for ADLs:  Bed Mobility:  Supine to Sit: Moderate assistance;Maximum assistance  Sit to Supine: Maximum assistance; Moderate assistance;Assist x2    Transfers:  Sit to Stand: Maximum assistance; Moderate assistance;Assist x2          Balance:  Sitting: With support  Standing - Static: Constant support  Standing - Dynamic : Poor    ADL Intervention:                           Lower Body Dressing Assistance  Socks: Minimum assistance(for straightening and positioning-already donned)  Shoes with Cloth Laces: Maximum assistance  Leg Crossed Method Used: No  Position Performed: Supine(patient too fearful for bending forward)  Cues: Verbal cues provided;Physical assistance         Cognitive Retraining  Safety/Judgement: Decreased awareness of need for safety;Decreased awareness of need for assistance    Therapeutic Exercises:     Pain:      Activity Tolerance:   Fair and fear of falling limits activity   Please refer to the flowsheet for vital signs taken during this treatment.     After treatment patient left in no apparent distress:   Supine in bed, Call bell within reach, Bed / chair alarm activated, and Side rails x 3    COMMUNICATION/COLLABORATION:   The patients plan of care was discussed with: Physical Therapist and Registered Nurse    CHARU Brown/L  Time Calculation: 23 mins

## 2019-12-24 NOTE — WOUND CARE
67 y.o. female with PMH including renal stones, CKD, HTN, chronic diarrhea, dementia, and hard of hearing. Admitted through the ER after fall at home. Wound care consulted for low reid and skin risk post fall. Assessment: 
Patient is sitting up in bed with caregiver at bedside. She turns and repositions well in bed with minimal assist. Skin assessment finds blanching erythema to bilateral heels and Bilateral inner gluteal moisture irritation (likely related to chronic diarrhea). Recommendation: 
Daily with skin care apply lotion to extremities and bony prominences for protection from friction/shear. Apply Desitin ointment to the inner glutes daily with skin care and as needed with moisture. Float heels and protect elbows (with posey elbow/heel protectors). Turn Q2h while in bed (with turn team). Protect from lines and devices. When up in chair use a waffle cushion and reposition every 20 minutes.  
 
Consult as needed,  
Asya BENNETTN KAHLIL [facility], [facility].

## 2019-12-24 NOTE — PROGRESS NOTES
12/24/2019   CARE MANAGEMENT NOTE:  (cross coverage). EMR reviewed. Transition Plan of Care:  1. SNF - pt accepted to [facility] after completion of Level 2.  2.  Level 2 screening thru [employer] is still pending. 3.  Pt is currently in OBS status however she completed a three night qualifying hospital stay from inpt admission on 12/15/19 - 12/18/19. CM will continue to follow pt and will update MD once Level 2 has been completed and approved.   Jesusita

## 2019-12-24 NOTE — PROGRESS NOTES
Bedside shift change report given to Yoni Santamaria RN (oncoming nurse) by Bud Esteves RN (offgoing nurse). Report included the following information SBAR, Kardex, Procedure Summary, Intake/Output, MAR and Recent Results. 1915 Patient stated she was having chest pain. RN assessed patient and did EKG. Called on call hospitalist. He will be up to assess.

## 2019-12-24 NOTE — PROGRESS NOTES
Problem: Mobility Impaired (Adult and Pediatric)  Goal: *Acute Goals and Plan of Care (Insert Text)  Description  FUNCTIONAL STATUS PRIOR TO ADMISSION: At baseline patient uses rollator. HOME SUPPORT PRIOR TO ADMISSION: The patient lived alone with hired caregiver during daylight hours to provide assistance. Physical Therapy Goals  Initiated 12/19/2019  1. Patient will move from supine to sit and sit to supine  in bed with minimal assistance/contact guard assist within 7 day(s). 2.  Patient will transfer from bed to chair and chair to bed with minimal assistance using the least restrictive device within 7 day(s). 3.  Patient will perform sit to stand with minimal assistance/contact guard assist within 7 day(s). 4.  Patient will ambulate with minimal assistance/contact guard assist for 50 feet with the least restrictive device within 7 day(s). 5.  Patient will ascend/descend 4 stairs with 2 handrail(s) with minimal assistance/contact guard assist within 7 day(s). Note:   PHYSICAL THERAPY TREATMENT  Patient: Vanesa Almaraz (76 y.o. female)  Date: 12/24/2019  Diagnosis: Weak [R53.1]  'light-for-dates' infant with signs of fetal malnutrition [P05.00]  Weakness [R53.1]   Weak       Precautions: Fall, WBAT  Chart, physical therapy assessment, plan of care and goals were reviewed. ASSESSMENT  Patient continues with skilled PT services. Pt comes to sit with mod to max of 2. Pt agreeable initially but resisted multiple tomes during tranfers. Pt mod to max of 2 sit to stand and mobilizing 5ft to chair and  held of 2. Pt not left sitting in chair as she was restless and unsafe in chair. Pt progress slow. Continue goals. Current Level of Function Impacting Discharge (mobility/balance): Pt mod to max assist of 2 for mobility. PLAN :  Patient continues to benefit from skilled intervention to address the above impairments. Continue treatment per established plan of care.   to address goals. Recommendation for discharge: (in order for the patient to meet his/her long term goals)  Therapy up to 5 days/week in SNF setting    This discharge recommendation:  Has been made in collaboration with the attending provider and/or case management    IF patient discharges home will need the following DME: rolling walker       SUBJECTIVE:       OBJECTIVE DATA SUMMARY:   Critical Behavior:  Neurologic State: Alert  Orientation Level: Oriented to person, Disoriented to time, Disoriented to situation, Disoriented to place  Cognition: Decreased command following(fear of falling )  Safety/Judgement: Decreased awareness of need for safety, Decreased awareness of need for assistance  Functional Mobility Training:  Bed Mobility:     Supine to Sit: Moderate assistance;Maximum assistance  Sit to Supine: Maximum assistance; Moderate assistance;Assist x2           Transfers:  Sit to Stand: Maximum assistance; Moderate assistance;Assist x2  Stand to Sit: Moderate assistance;Assist x2                             Balance:  Sitting: With support  Standing - Static: Constant support  Standing - Dynamic : Poor  Ambulation/Gait Training:  Distance (ft): 5 Feet (ft)  Assistive Device: Gait belt  Ambulation - Level of Assistance: Maximum assistance; Moderate assistance of 2                                        Activity Tolerance:   Poor  Please refer to the flowsheet for vital signs taken during this treatment. After treatment patient left in no apparent distress:   Supine in bed    COMMUNICATION/COLLABORATION:   The patients plan of care was discussed with: Physical Therapist    Catarina Zavaleta PTA   Time Calculation: 23 mins            Problem: Mobility Impaired (Adult and Pediatric)  Goal: *Acute Goals and Plan of Care (Insert Text)  Description  FUNCTIONAL STATUS PRIOR TO ADMISSION: At baseline patient uses rollator.      HOME SUPPORT PRIOR TO ADMISSION: The patient lived alone with hired caregiver during daylight hours to provide assistance. Physical Therapy Goals  Initiated 12/19/2019  1. Patient will move from supine to sit and sit to supine  in bed with minimal assistance/contact guard assist within 7 day(s). 2.  Patient will transfer from bed to chair and chair to bed with minimal assistance using the least restrictive device within 7 day(s). 3.  Patient will perform sit to stand with minimal assistance/contact guard assist within 7 day(s). 4.  Patient will ambulate with minimal assistance/contact guard assist for 50 feet with the least restrictive device within 7 day(s). 5.  Patient will ascend/descend 4 stairs with 2 handrail(s) with minimal assistance/contact guard assist within 7 day(s). Note:   PHYSICAL THERAPY TREATMENT  Patient: Brandy Alegre (3 y.o. female)  Date: 12/24/2019  Diagnosis: Weak [R53.1]  'light-for-dates' infant with signs of fetal malnutrition [P05.00]  Weakness [R53.1]   Weak       Precautions: Fall, WBAT  Chart, physical therapy assessment, plan of care and goals were reviewed. ASSESSMENT  Patient continues with skilled PT services. Pt comes to sit with mod to max of 2. Pt agreeable initially but resisted multiple tomes during tranfers. Pt mod to max of 2 sit to stand and mobilizing 5ft to chair and  held of 2. Pt not left sitting in chair as she was restless and unsafe in chair. Pt progress slow. Continue goals. Current Level of Function Impacting Discharge (mobility/balance): Pt mod to max assist of 2 for mobility. PLAN :  Patient continues to benefit from skilled intervention to address the above impairments. Continue treatment per established plan of care. to address goals.     Recommendation for discharge: (in order for the patient to meet his/her long term goals)  Therapy up to 5 days/week in SNF setting    This discharge recommendation:  Has been made in collaboration with the attending provider and/or case management    IF patient discharges home will need the following DME: rolling walker       SUBJECTIVE:       OBJECTIVE DATA SUMMARY:   Critical Behavior:  Neurologic State: Alert  Orientation Level: Oriented to person, Disoriented to time, Disoriented to situation, Disoriented to place  Cognition: Decreased command following(fear of falling )  Safety/Judgement: Decreased awareness of need for safety, Decreased awareness of need for assistance  Functional Mobility Training:  Bed Mobility:     Supine to Sit: Moderate assistance;Maximum assistance  Sit to Supine: Maximum assistance; Moderate assistance;Assist x2           Transfers:  Sit to Stand: Maximum assistance; Moderate assistance;Assist x2  Stand to Sit: Moderate assistance;Assist x2                             Balance:  Sitting: With support  Standing - Static: Constant support  Standing - Dynamic : Poor  Ambulation/Gait Training:  Distance (ft): 5 Feet (ft)  Assistive Device: Gait belt  Ambulation - Level of Assistance: Maximum assistance; Moderate assistance of 2                                        Activity Tolerance:   Poor  Please refer to the flowsheet for vital signs taken during this treatment.     After treatment patient left in no apparent distress:   Supine in bed    COMMUNICATION/COLLABORATION:   The patients plan of care was discussed with: Physical Therapist    Abdi Aparicio PTA   Time Calculation: 23 mins

## 2019-12-25 PROBLEM — N20.0 BILATERAL KIDNEY STONES: Chronic | Status: ACTIVE | Noted: 2019-12-15

## 2019-12-25 PROBLEM — H91.90 HARD OF HEARING: Chronic | Status: ACTIVE | Noted: 2019-12-19

## 2019-12-25 PROBLEM — I10 HTN (HYPERTENSION): Chronic | Status: ACTIVE | Noted: 2019-12-16

## 2019-12-25 PROBLEM — F03.90 DEMENTIA (HCC): Chronic | Status: ACTIVE | Noted: 2019-12-19

## 2019-12-25 PROBLEM — J18.9 PNEUMONIA: Status: RESOLVED | Noted: 2019-12-19 | Resolved: 2019-12-25

## 2019-12-25 PROBLEM — R53.81 DEBILITY: Chronic | Status: ACTIVE | Noted: 2019-12-19

## 2019-12-25 PROBLEM — D72.829 LEUKOCYTOSIS: Status: RESOLVED | Noted: 2019-12-15 | Resolved: 2019-12-25

## 2019-12-25 PROCEDURE — 99218 HC RM OBSERVATION: CPT

## 2019-12-25 PROCEDURE — 74011250636 HC RX REV CODE- 250/636: Performed by: INTERNAL MEDICINE

## 2019-12-25 PROCEDURE — 96372 THER/PROPH/DIAG INJ SC/IM: CPT

## 2019-12-25 PROCEDURE — 74011250637 HC RX REV CODE- 250/637: Performed by: INTERNAL MEDICINE

## 2019-12-25 RX ADMIN — Medication 10 ML: at 21:51

## 2019-12-25 RX ADMIN — Medication: at 09:14

## 2019-12-25 RX ADMIN — Medication 10 ML: at 14:00

## 2019-12-25 RX ADMIN — ENOXAPARIN SODIUM 40 MG: 40 INJECTION SUBCUTANEOUS at 09:14

## 2019-12-25 RX ADMIN — Medication 10 ML: at 06:00

## 2019-12-25 NOTE — PROGRESS NOTES
Omar Freeman Bardolph 80  [facility] [facility], [address] [place] [address]  [phone]      Medical Progress Note      NAME: Ulysses Hausen   :  1940  MRM:  [id number]    Date/Time: 2019  12:00 PM         Subjective:     Chief Complaint:  Weak: patient cannot tell me why she is here or what has happened recently due to dementia    ROS:  (bold if positive, if negative)    Unable to obtain          Objective:       Vitals:          Last 24hrs VS reviewed since prior progress note.  Most recent are:    Visit Vitals  /61 (BP 1 Location: Right arm, BP Patient Position: At rest)   Pulse 61   Temp 97.6 °F (36.4 °C)   Resp 16   Ht 5' (1.524 m)   Wt 49.9 kg (110 lb)   SpO2 97%   Breastfeeding No   BMI 21.48 kg/m²     SpO2 Readings from Last 6 Encounters:   19 97%   19 99%   19 100%   19 99%   10/31/19 99%   10/13/19 98%        No intake or output data in the 24 hours ending 19 1200       Exam:     Physical Exam:    Gen:  Well-developed, well-nourished, frail, elderly, in no acute distress  HEENT:  Pink conjunctivae, PERRL, hearing intact to voice, moist mucous membranes  Neck:  Supple, without masses, thyroid non-tender  Resp:  No accessory muscle use, clear breath sounds without wheezes rales or rhonchi  Card:  No murmurs, normal S1, S2 without thrills, bruits or peripheral edema, 2+ pedal pulses  Abd:  Soft, non-tender, non-distended, normoactive bowel sounds are present, no palpable organomegaly and no detectable hernias  Lymph:  No cervical or inguinal adenopathy  Musc:  No cyanosis or clubbing  Skin:  No rashes or ulcers, skin turgor is good, cap refill <2 sec  Neuro:  Cranial nerves are grossly intact, no focal motor weakness, follows some commands appropriately  Psych:  Poor insight, oriented to person, but not place and time, alert       Medications Reviewed: (see below)    Lab Data Reviewed: (see below)    ______________________________________________________________________    Medications:     Current Facility-Administered Medications   Medication Dose Route Frequency    zinc oxide-cod liver oil (DESITIN) 40 % paste   Topical DAILY    sodium chloride (NS) flush 5-40 mL  5-40 mL IntraVENous Q8H    sodium chloride (NS) flush 5-40 mL  5-40 mL IntraVENous PRN    enoxaparin (LOVENOX) injection 40 mg  40 mg SubCUTAneous Q24H            Lab Review:     Recent Labs     12/24/19  0425   WBC 9.4   HGB 12.1   HCT 36.6        Recent Labs     12/24/19  0425   CREA 0.41*     No results found for: GLUCPOC  No results for input(s): PH, PCO2, PO2, HCO3, FIO2 in the last 72 hours. No results for input(s): INR, INREXT in the last 72 hours.   No results found for: SDES  Lab Results   Component Value Date/Time    Culture result: NO GROWTH 5 DAYS 12/15/2019 10:53 PM    Culture result: NO GROWTH 1 DAY 12/15/2019 08:03 PM            Assessment:     Principal Problem:    Weak (12/19/2019)    Active Problems:    Bilateral kidney stones (12/15/2019)      HTN (hypertension) (12/16/2019)      Dementia ([facility] [address] (12/19/2019)      Jensen Hernandez (12/19/2019)           Plan:     Principal Problem:    Weak (12/19/2019)/Debility (12/19/2019)   - due to recent hospitalization   - attempting PT/OT, limited by patient's dementia   - SNF placement    Active Problems:    Bilateral kidney stones (12/15/2019)   - leukocytosis has resolved without antibiotics   - no acute intervention needed from , had stent last admission      HTN (hypertension) (12/16/2019)   - BP okay on meds as above      Dementia ([address] (12/19/2019)   - appears to be unable to care for herself      Total time spent in patient care: 25 minutes                  Care Plan discussed with: Patient and Nursing Staff    Discussed:  Code Status, Care Plan and D/C Planning    Prophylaxis:  Lovenox    Disposition: SNF/LTC           ___________________________________________________    Attending Physician: Cherri Morales MD

## 2019-12-25 NOTE — PROGRESS NOTES
shift change report given to [id number] (oncoming nurse) by Kelsea Gerard (offgoing nurse). Report included the following information SBAR, Kardex, MAR, Accordion and Recent Results.

## 2019-12-25 NOTE — PROGRESS NOTES
Bedside shift change report given to Hollis Mccann RN (oncoming nurse) by Marva Kat RN (offgoing nurse). Report included the following information SBAR, Kardex, Procedure Summary, Intake/Output, MAR and Recent Results.

## 2019-12-26 LAB
ATRIAL RATE: 75 BPM
CALCULATED P AXIS, ECG09: 71 DEGREES
CALCULATED R AXIS, ECG10: 65 DEGREES
CALCULATED T AXIS, ECG11: 34 DEGREES
DIAGNOSIS, 93000: NORMAL
P-R INTERVAL, ECG05: 106 MS
Q-T INTERVAL, ECG07: 372 MS
QRS DURATION, ECG06: 72 MS
QTC CALCULATION (BEZET), ECG08: 415 MS
VENTRICULAR RATE, ECG03: 75 BPM

## 2019-12-26 PROCEDURE — 77030038269 HC DRN EXT URIN PURWCK BARD -A

## 2019-12-26 PROCEDURE — 97530 THERAPEUTIC ACTIVITIES: CPT

## 2019-12-26 PROCEDURE — 99218 HC RM OBSERVATION: CPT

## 2019-12-26 PROCEDURE — 74011250636 HC RX REV CODE- 250/636: Performed by: INTERNAL MEDICINE

## 2019-12-26 PROCEDURE — 96372 THER/PROPH/DIAG INJ SC/IM: CPT

## 2019-12-26 PROCEDURE — 97116 GAIT TRAINING THERAPY: CPT

## 2019-12-26 RX ADMIN — Medication: at 08:59

## 2019-12-26 RX ADMIN — ENOXAPARIN SODIUM 40 MG: 40 INJECTION SUBCUTANEOUS at 08:57

## 2019-12-26 RX ADMIN — Medication 10 ML: at 06:00

## 2019-12-26 RX ADMIN — Medication 10 ML: at 15:33

## 2019-12-26 RX ADMIN — Medication 10 ML: at 21:01

## 2019-12-26 NOTE — PROGRESS NOTES
Problem: Mobility Impaired (Adult and Pediatric)  Goal: *Acute Goals and Plan of Care (Insert Text)  Description  FUNCTIONAL STATUS PRIOR TO ADMISSION: At baseline patient uses rollator. HOME SUPPORT PRIOR TO ADMISSION: The patient lived alone with hired caregiver during daylight hours to provide assistance. Physical Therapy Goals  Initiated 12/19/2019 Weekly Reassessment 12/26/19 (Goals ongoing)  1. Patient will move from supine to sit and sit to supine  in bed with minimal assistance/contact guard assist within 7 day(s). 2.  Patient will transfer from bed to chair and chair to bed with minimal assistance using the least restrictive device within 7 day(s). 3.  Patient will perform sit to stand with minimal assistance/contact guard assist within 7 day(s). 4.  Patient will ambulate with minimal assistance/contact guard assist for 50 feet with the least restrictive device within 7 day(s). 5.  Patient will ascend/descend 4 stairs with 2 handrail(s) with minimal assistance/contact guard assist within 7 day(s). 6.  Patient to maintain sitting balance on edge of bed for at least 5 minutes with min/CGA assist within 7 days. (added 12/26)      Outcome: Progressing Towards Goal   PHYSICAL THERAPY TREATMENT: WEEKLY REASSESSMENT  Patient: Michelle Dunn (51 y.o. female)  Date: 12/26/2019  Primary Diagnosis: Weak [R53.1]  'light-for-dates' infant with signs of fetal malnutrition [P05.00]  Weakness [R53.1]        Precautions:   Fall, WBAT      ASSESSMENT  Patient continues with skilled PT services and is progressing towards goals. Patient seen for weekly reassessment; progress has been slow as patient has only been seen by PT twice prior to today. Patient is highly anxious with mobility attempts and shakes severely. She needs a lot of reassurance during PT session. She is at high risk for falls. She has history of Down's and is very [place]. Patient is no longer appropriate to live alone and SNF is recommended. Patient's progression toward goals since last assessment: Patient has slow progression toward goals so far. Current Level of Function Impacting Discharge (mobility/balance): Today she sat on edge of bed with +2 mod assist. Patient needs constant support in sitting due to being overall shaky and anxious. She stood with +2 max assist, then ambulated around the bed with +2 mod assist. Patient left in recliner with pillow support and chair alarm in place. Functional Outcome Measure: The patient scored 40/100 on the Barthel outcome measure. Other factors to consider for discharge: cognitive impairment, high fall risk, highly anxious with mobility efforts         PLAN :  Goals have been updated based on progression since last assessment. Patient continues to benefit from skilled intervention to address the above impairments. Recommendations and Planned Interventions: bed mobility training, transfer training, gait training, and therapeutic exercises      Frequency/Duration: Patient will be followed by physical therapy:  5 times a week to address goals. Recommendation for discharge: (in order for the patient to meet his/her long term goals)  Therapy up to 5 days/week in SNF setting    This discharge recommendation:  Has not yet been discussed the attending provider and/or case management    IF patient discharges home will need the following DME: none         SUBJECTIVE:   Patient stated What did you get for [place]? Chau Addison    OBJECTIVE DATA SUMMARY:   HISTORY:    Past Medical History:   Diagnosis Date    Arthritis     Chronic kidney disease     kidney stones    Chronic pain     right hip    Hypertension     Ill-defined condition     chronic diarrhea     Past Surgical History:   Procedure Laterality Date    HX APPENDECTOMY      HX ORTHOPAEDIC      bilateral carpal tunnel    HX TONSILLECTOMY         Personal factors and/or comorbidities impacting plan of care: Cognitive impairment; high fall risk, highly anxious; needs 24/7 assistance for safety. Home Situation  Home Environment: Apartment  One/Two Story Residence: One story  Living Alone: Yes  Support Systems: (caregiver during daytime hours alone at night)  Patient Expects to be Discharged to[de-identified] Private residence  Current DME Used/Available at Home: Perla Dynes, rollator  Tub or Shower Type: Shower    EXAMINATION/PRESENTATION/DECISION MAKING:   Critical Behavior:  Neurologic State: Alert  Orientation Level: Oriented to person  Cognition: Decreased command following, Decreased attention/concentration  Safety/Judgement: Awareness of environment  Hearing: Auditory  Auditory Impairment: Hard of hearing, bilateral  Skin:  not fully observed    Range Of Motion:      Generally decreased but functional                    Strength:        Generally decreased but functional                 Tone & Sensation:          Functional                           Functional Mobility:  Bed Mobility:     Supine to Sit: Assist x2; Moderate assistance     Scooting: Moderate assistance  Transfers:  Sit to Stand: Assist x2;Maximum assistance  Stand to Sit: Assist x2; Moderate assistance        Bed to Chair: Assist x2; Moderate assistance              Balance:   Sitting: Impaired  Sitting - Static: Fair (occasional)  Sitting - Dynamic: Fair (occasional)  Standing: Impaired  Standing - Static: Constant support  Standing - Dynamic : Constant support  Ambulation/Gait Training:  Distance (ft): 15 Feet (ft)  Assistive Device: Gait belt(HHA of 2)  Ambulation - Level of Assistance: Moderate assistance;Assist x2        Gait Abnormalities: Steppage gait;Trunk sway increased        Base of Support: Narrowed                                   Functional Measure:  Barthel Index:    Bathin  Bladder: 0  Bowels: 5  Groomin  Dressin  Feeding: 10  Mobility: 5  Stairs: 0  Toilet Use: 5  Transfer (Bed to Chair and Back): 5  Total: 40/100       The Barthel ADL Index: Guidelines  1.  The index should be used as a record of what a patient does, not as a record of what a patient could do. 2. The main aim is to establish degree of independence from any help, physical or verbal, however minor and for whatever reason. 3. The need for supervision renders the patient not independent. 4. A patient's performance should be established using the best available evidence. Asking the patient, friends/relatives and nurses are the usual sources, but direct observation and common sense are also important. However direct testing is not needed. 5. Usually the patient's performance over the preceding 24-48 hours is important, but occasionally longer periods will be relevant. 6. Middle categories imply that the patient supplies over 50 per cent of the effort. 7. Use of aids to be independent is allowed. Lucian Camarena., Barthel, D.W. (8035). Functional evaluation: the Barthel Index. [facility] (14)2. Rebecca Douglas madison KYE Li, Catrina Vang., Ciro Gutierrez., [place], [address] [place] [address] (1999). Measuring the change indisability after inpatient rehabilitation; comparison of the responsiveness of the Barthel Index and Functional [place] Measure. Journal of Neurology, Neurosurgery, and Psychiatry, 66(4), 258-102. Deven Prince, N.J.A, MALENA Alfaro, & Alanis Lopez, M.A. (2004.) Assessment of post-stroke quality of life in cost-effectiveness studies: The usefulness of the Barthel Index and the EuroQoL-5D. Quality of Life Research, 13, 427-43          Pain Rating:  No complaints at this time. Activity Tolerance:   Patient get very anxious. Please refer to the flowsheet for vital signs taken during this treatment. After treatment patient left in no apparent distress:   Sitting in chair, Call bell within reach, and Bed / chair alarm activated    COMMUNICATION/EDUCATION:   The patients plan of care was discussed with: Occupational Therapist and Registered Nurse.     Fall prevention education was provided and the patient/caregiver indicated understanding. and Patient/family agree to work toward stated goals and plan of care.     Thank you for this referral.  Lore Berger, PT   Time Calculation: 23 mins

## 2019-12-26 NOTE — PROGRESS NOTES
Omar Naik johnathan Amelia 80  [address] [facility], [address] [place] [address]  [phone]      Medical Progress Note      NAME: Miryam Elaine   :  1940  MRM:  [id number]    Date/Time: 2019  10:59 AM          Subjective:     Chief Complaint:  Weak: patient cannot tell me why she is here or what has happened recently due to dementia    ROS:  (bold if positive, if negative)    Unable to obtain          Objective:       Vitals:          Last 24hrs VS reviewed since prior progress note.  Most recent are:    Visit Vitals  /61 (BP 1 Location: Right arm, BP Patient Position: At rest)   Pulse (!) 54   Temp 98.4 °F (36.9 °C)   Resp 16   Ht 5' (1.524 m)   Wt 49.9 kg (110 lb)   SpO2 95%   Breastfeeding No   BMI 21.48 kg/m²     SpO2 Readings from Last 6 Encounters:   19 95%   19 99%   19 100%   19 99%   10/31/19 99%   10/13/19 98%            Intake/Output Summary (Last 24 hours) at 2019 1059  Last data filed at 2019 0854  Gross per 24 hour   Intake    Output 1 ml   Net -1 ml          Exam:     Physical Exam:    Gen:  Well-developed, well-nourished, frail, elderly, in no acute distress  HEENT:  Pink conjunctivae, PERRL, hearing intact to voice, moist mucous membranes  Neck:  Supple, without masses, thyroid non-tender  Resp:  No accessory muscle use, clear breath sounds without wheezes rales or rhonchi  Card:  No murmurs, normal S1, S2 without thrills, bruits or peripheral edema, 2+ pedal pulses  Abd:  Soft, non-tender, non-distended, normoactive bowel sounds are present, no palpable organomegaly and no detectable hernias  Lymph:  No cervical or inguinal adenopathy  Musc:  No cyanosis or clubbing  Skin:  No rashes or ulcers, skin turgor is good, cap refill <2 sec  Neuro:  Cranial nerves are grossly intact, no focal motor weakness, follows some commands appropriately  Psych:  Poor insight, oriented to person, but not place and time, alert       Medications Reviewed: (see below)    Lab Data Reviewed: (see below)    ______________________________________________________________________    Medications:     Current Facility-Administered Medications   Medication Dose Route Frequency    zinc oxide-cod liver oil (DESITIN) 40 % paste   Topical DAILY    sodium chloride (NS) flush 5-40 mL  5-40 mL IntraVENous Q8H    sodium chloride (NS) flush 5-40 mL  5-40 mL IntraVENous PRN    enoxaparin (LOVENOX) injection 40 mg  40 mg SubCUTAneous Q24H            Lab Review:     Recent Labs     12/24/19  0425   WBC 9.4   HGB 12.1   HCT 36.6        Recent Labs     12/24/19  0425   CREA 0.41*     No results found for: GLUCPOC  No results for input(s): PH, PCO2, PO2, HCO3, FIO2 in the last 72 hours. No results for input(s): INR, INREXT, INREXT in the last 72 hours.   No results found for: SDES  Lab Results   Component Value Date/Time    Culture result: NO GROWTH 5 DAYS 12/15/2019 10:53 PM    Culture result: NO GROWTH 1 DAY 12/15/2019 08:03 PM            Assessment:     Principal Problem:    Weak (12/19/2019)    Active Problems:    Bilateral kidney stones (12/15/2019)      HTN (hypertension) (12/16/2019)      Dementia ([address] (12/19/2019)      Indy Civil (12/19/2019)           Plan:     Principal Problem:    Weak (12/19/2019)/Debility (12/19/2019)   - due to recent hospitalization   - attempting PT/OT, limited by patient's dementia   - SNF placement    Active Problems:    Bilateral kidney stones (12/15/2019)   - leukocytosis has resolved without antibiotics   - no acute intervention needed from , had stent last admission      HTN (hypertension) (12/16/2019)   - BP okay on meds as above      Dementia ([address] (12/19/2019)   - appears to be unable to care for herself      Total time spent in patient care: 15 minutes                  Care Plan discussed with: Patient, Care Manager and Nursing Staff    Discussed:  Code Status, Care Plan and D/C Planning    Prophylaxis:  Lovenox    Disposition: [facility]/LTC           ___________________________________________________    Attending Physician: Marylee Alberts, MD

## 2019-12-26 NOTE — PROGRESS NOTES
OCCUPATIONAL THERAPY TREATMENT/WEEKLY RE-EVALUATION  Patient: Michelle Dunn (59 y.o. female)  Date: 12/26/2019  Diagnosis: Weak [R53.1]  'light-for-dates' infant with signs of fetal malnutrition [P05.00]  Weakness [R53.1]   Weak       Precautions: Fall, WBAT  Chart, occupational therapy assessment, plan of care, and goals were reviewed. ASSESSMENT  Based on the objective data described below, patient received in bed eating lunch and agreeable to transfer to chair for eating. Patient using cross leg technique in supine to don shoes but requires significant assist to perform. Mod assist x 2 to move to EOB due to fear. Patient able to stand with max assist x 2 at EOB. Patient tapping each foot with straight legs at EOB and returns to sitting. OT instructed patient to transfer to chair and with verifying therapist would assist her, patient agreeable. Max assist x 2 for transfer to chair. Patient left in chair, eating lunch. Goals updated today for weekly re assessment. Patient goals downgraded secondary to inconsistent performance and limited ability secondary to fear which increases safety concerns. Current Level of Function Impacting Discharge (ADLs): fear for mobility, up to max assist x 2    Other factors to consider for discharge: lives alone         PLAN :  Goals have been updated based on progression since last assessment. Patient continues to benefit from skilled intervention to address the above impairments. Continue to follow patient 3 times a week to address goals.     Recommend with staff: Ramos alonso able    Recommend next OT session: progression of goals    Recommendation for discharge: (in order for the patient to meet his/her long term goals)  Therapy up to 5 days/week in SNF setting    This discharge recommendation:  Has been made in collaboration with the attending provider and/or case management    Equipment recommendations for successful discharge (if) home: none       SUBJECTIVE: Patient stated Elodia Bolden you going to help me? Anatoliy Corcoran    OBJECTIVE DATA SUMMARY:   Cognitive/Behavioral Status:  Neurologic State: Alert  Orientation Level: Oriented to person  Cognition: Decreased command following;Decreased attention/concentration     Perseveration: Perseverates during conversation  Safety/Judgement: Awareness of environment    Functional Mobility and Transfers for ADLs:  Bed Mobility:  Supine to Sit: Assist x2; Moderate assistance  Scooting: Moderate assistance    Transfers:  Sit to Stand: Assist x2;Maximum assistance     Bed to Chair: Assist x2; Moderate assistance    Balance:  Sitting: Impaired  Sitting - Static: Fair (occasional)  Sitting - Dynamic: Fair (occasional)  Standing: Impaired  Standing - Static: Constant support  Standing - Dynamic : Constant support    ADL Intervention:  Feeding  Feeding Assistance: Set-up  Food to Mouth: Set-up  Drink to Mouth: Set-up    Grooming  Grooming Assistance: Moderate assistance(seated EOB, fear of falling)                   Lower Body Dressing Assistance  Socks: Maximum assistance  Shoes with Cloth Laces: Maximum assistance  Leg Crossed Method Used: Yes  Position Performed: Supine  Cues: Physical assistance;Verbal cues provided         Cognitive Retraining  Safety/Judgement: Awareness of environment    Pain:      Activity Tolerance:   Poor and increased anxiety regarding mobility   Please refer to the flowsheet for vital signs taken during this treatment.     After treatment patient left in no apparent distress:   Sitting in chair, Call bell within reach, and Bed / chair alarm activated    COMMUNICATION/COLLABORATION:   The patients plan of care was discussed with: Physical Therapist and Registered Nurse    CHARU Yeung/ADRIANA  Time Calculation: 18 mins

## 2019-12-26 NOTE — PROGRESS NOTES
12/26/2019   CARE MANAGEMENT NOTE:  (cross coverage). EMR reviewed.       Transition Plan of Care:  1. SNF - pt accepted to [facility] after completion of Level 2.  2.  Level 2 screening thru [employer] is still pending. 3.  Pt is currently in OBS status however she completed a three night qualifying hospital stay from inpt admission on 12/15/19 - 12/18/19.     CM will continue to follow pt and will update MD once Level 2 has been completed and approved.   Jesusita

## 2019-12-27 LAB
ANION GAP SERPL CALC-SCNC: 7 MMOL/L (ref 5–15)
BUN SERPL-MCNC: 13 MG/DL (ref 6–20)
BUN/CREAT SERPL: 28 (ref 12–20)
CALCIUM SERPL-MCNC: 8.4 MG/DL (ref 8.5–10.1)
CHLORIDE SERPL-SCNC: 110 MMOL/L (ref 97–108)
CO2 SERPL-SCNC: 24 MMOL/L (ref 21–32)
CREAT SERPL-MCNC: 0.47 MG/DL (ref 0.55–1.02)
ERYTHROCYTE [DISTWIDTH] IN BLOOD BY AUTOMATED COUNT: 12.3 % (ref 11.5–14.5)
GLUCOSE SERPL-MCNC: 110 MG/DL (ref 65–100)
HCT VFR BLD AUTO: 40.4 % (ref 35–47)
HGB BLD-MCNC: 13.4 G/DL (ref 11.5–16)
MCH RBC QN AUTO: 30.6 PG (ref 26–34)
MCHC RBC AUTO-ENTMCNC: 33.2 G/DL (ref 30–36.5)
MCV RBC AUTO: 92.2 FL (ref 80–99)
NRBC # BLD: 0 K/UL (ref 0–0.01)
NRBC BLD-RTO: 0 PER 100 WBC
PLATELET # BLD AUTO: 377 K/UL (ref 150–400)
PMV BLD AUTO: 10 FL (ref 8.9–12.9)
POTASSIUM SERPL-SCNC: 4.4 MMOL/L (ref 3.5–5.1)
RBC # BLD AUTO: 4.38 M/UL (ref 3.8–5.2)
SODIUM SERPL-SCNC: 141 MMOL/L (ref 136–145)
WBC # BLD AUTO: 13.7 K/UL (ref 3.6–11)

## 2019-12-27 PROCEDURE — 80048 BASIC METABOLIC PNL TOTAL CA: CPT

## 2019-12-27 PROCEDURE — 96372 THER/PROPH/DIAG INJ SC/IM: CPT

## 2019-12-27 PROCEDURE — 74011250636 HC RX REV CODE- 250/636: Performed by: INTERNAL MEDICINE

## 2019-12-27 PROCEDURE — 85027 COMPLETE CBC AUTOMATED: CPT

## 2019-12-27 PROCEDURE — 99218 HC RM OBSERVATION: CPT

## 2019-12-27 PROCEDURE — 36415 COLL VENOUS BLD VENIPUNCTURE: CPT

## 2019-12-27 PROCEDURE — 97530 THERAPEUTIC ACTIVITIES: CPT

## 2019-12-27 RX ADMIN — Medication 10 ML: at 14:15

## 2019-12-27 RX ADMIN — Medication 10 ML: at 20:47

## 2019-12-27 RX ADMIN — Medication: at 08:30

## 2019-12-27 RX ADMIN — Medication 10 ML: at 05:09

## 2019-12-27 RX ADMIN — ENOXAPARIN SODIUM 40 MG: 40 INJECTION SUBCUTANEOUS at 08:30

## 2019-12-27 NOTE — PROGRESS NOTES
Bedside and Verbal shift change report given to [employer] (oncoming nurse) by Richardson Ballard RN  (offgoing nurse). Report included the following information SBAR, Kardex and MAR.

## 2019-12-27 NOTE — PROGRESS NOTES
Bedside shift change report given to Arnel Schmidt RN (oncoming nurse) by Stephon Flood (offgoing nurse). Report included the following information SBAR, Kardex, MAR, Accordion and Recent Results.    Stephon Flood

## 2019-12-27 NOTE — PROGRESS NOTES
[facility]mar Naik [facility] [address]  [address] [facility], [address] [place] [address]  [phone]      Medical Progress Note      NAME: Lissett Alexis   :  1940  MRM:  [id number]    Date/Time: 2019  11:10 AM           Subjective:     Chief Complaint:  Weak: patient cannot tell me why she is here or what has happened recently due to dementia    ROS:  (bold if positive, if negative)    Unable to obtain          Objective:       Vitals:          Last 24hrs VS reviewed since prior progress note.  Most recent are:    Visit Vitals  /72 (BP 1 Location: Right arm, BP Patient Position: At rest)   Pulse (!) 57   Temp 97.7 °F (36.5 °C)   Resp 18   Ht 5' (1.524 m)   Wt 49.9 kg (110 lb)   SpO2 96%   Breastfeeding No   BMI 21.48 kg/m²     SpO2 Readings from Last 6 Encounters:   19 96%   19 99%   19 100%   19 99%   10/31/19 99%   10/13/19 98%          No intake or output data in the 24 hours ending 19 1110       Exam:     Physical Exam:    Gen:  Well-developed, well-nourished, frail, elderly, in no acute distress  HEENT:  Pink conjunctivae, PERRL, hearing intact to voice, moist mucous membranes  Neck:  Supple, without masses, thyroid non-tender  Resp:  No accessory muscle use, clear breath sounds without wheezes rales or rhonchi  Card:  No murmurs, normal S1, S2 without thrills, bruits or peripheral edema, 2+ pedal pulses  Abd:  Soft, non-tender, non-distended, normoactive bowel sounds are present, no palpable organomegaly and no detectable hernias  Lymph:  No cervical or inguinal adenopathy  Musc:  No cyanosis or clubbing  Skin:  No rashes or ulcers, skin turgor is good, cap refill <2 sec  Neuro:  Cranial nerves are grossly intact, no focal motor weakness, follows some commands appropriately  Psych:  Poor insight, oriented to person, but not place and time, alert       Medications Reviewed: (see below)    Lab Data Reviewed: (see below)    ______________________________________________________________________    Medications:     Current Facility-Administered Medications   Medication Dose Route Frequency    zinc oxide-cod liver oil (DESITIN) 40 % paste   Topical DAILY    sodium chloride (NS) flush 5-40 mL  5-40 mL IntraVENous Q8H    sodium chloride (NS) flush 5-40 mL  5-40 mL IntraVENous PRN    enoxaparin (LOVENOX) injection 40 mg  40 mg SubCUTAneous Q24H            Lab Review:     Recent Labs     12/27/19  0109   WBC 13.7*   HGB 13.4   HCT 40.4        Recent Labs     12/27/19  0109      K 4.4   *   CO2 24   *   BUN 13   CREA 0.47*   CA 8.4*     No results found for: GLUCPOC  No results for input(s): PH, PCO2, PO2, HCO3, FIO2 in the last 72 hours. No results for input(s): INR, INREXT, INREXT in the last 72 hours.   No results found for: SDES  Lab Results   Component Value Date/Time    Culture result: NO GROWTH 5 DAYS 12/15/2019 10:53 PM    Culture result: NO GROWTH 1 DAY 12/15/2019 08:03 PM            Assessment:     Principal Problem:    Weak (12/19/2019)    Active Problems:    Bilateral kidney stones (12/15/2019)      HTN (hypertension) (12/16/2019)      Dementia ([address] (12/19/2019)      Alessia Thomas (12/19/2019)           Plan:     Principal Problem:    Weak (12/19/2019)/Debility (12/19/2019)   - due to recent hospitalization   - attempting PT/OT, limited by patient's dementia   - SNF placement    Active Problems:    Bilateral kidney stones (12/15/2019)   - leukocytosis has resolved without antibiotics   - no acute intervention needed from , had stent last admission      HTN (hypertension) (12/16/2019)   - BP okay on meds as above      Dementia ([address] (12/19/2019)   - appears to be unable to care for herself      Total time spent in patient care: 15 minutes                  Care Plan discussed with: Patient, Care Manager and Nursing Staff    Discussed:  Code Status, Care Plan and D/C Planning    Prophylaxis: Lovenox    Disposition:  SNF/LTC           ___________________________________________________    Attending Physician: Davy Posadas MD

## 2019-12-27 NOTE — PROGRESS NOTES
12/27/2019   CARE MANAGEMENT NOTE:  (cross coverage).  EMR reviewed.       Transition Plan of Care:  1.  SNF - pt accepted to [facility] after completion of Level 2.  2.  Level 2 screening thru [employer] is still pending. 3.  Pt is currently in OBS status however she completed a three night qualifying hospital stay from inpt admission on 12/15/19 - 12/18/19.     CM will continue to follow pt and will update MD once Level 2 has been completed and approved.   Jesusita

## 2019-12-27 NOTE — PROGRESS NOTES
Problem: Self Care Deficits Care Plan (Adult)  Goal: *Acute Goals and Plan of Care (Insert Text)  Description    FUNCTIONAL STATUS PRIOR TO ADMISSION: Patient was supervision for basic and instrumental ADLs. HOME SUPPORT: The patient lived alone with caregiver intermittently to provide assistance. Occupational Therapy Goals  Weekly re assessment goals 12/26/2019  1. Patient will perform lower body dressing with moderate within 7 day(s). 2.  Patient will perform grooming, seated EOB with minimum assistance  within 7 day(s). 3.  Patient will perform toilet transfers with moderate assistance within 7 day(s). 4.  Patient will perform all aspects of toileting with moderate assistance within 7 day(s). 5.  Patient will participate in upper extremity therapeutic exercise/activities with supervision/set-up for 5 minutes within 7 day(s). Initiated 12/19/2019  1. Patient will perform lower body dressing with supervision/set-up within 7 day(s). - downgraded  2. Patient will perform grooming, standing at sink, with supervision/set-up within 7 day(s). - downgraded   3. Patient will perform toilet transfers with supervision/set-up within 7 day(s). - downgraded   4. Patient will perform all aspects of toileting with supervision/set-up within 7 day(s). -downgraded   5. Patient will participate in upper extremity therapeutic exercise/activities with supervision/set-up for 5 minutes within 7 day(s). - progressing          Outcome: Progressing Towards Goal   OCCUPATIONAL THERAPY TREATMENT  Patient: Roxie Nolasco (9 y.o. female)  Date: 12/27/2019  Diagnosis: Weak [R53.1]  'light-for-dates' infant with signs of fetal malnutrition [P05.00]  Weakness [R53.1]   Weak       Precautions: Fall, WBAT  Chart, occupational therapy assessment, plan of care, and goals were reviewed. ASSESSMENT  Patient continues with skilled OT services and is progressing towards goals.   Pt needs one on one visual demonstration of UE exercises and difficulty following through with exercise. Pt able to pull both socks up long sitting in bed cross legged position, pt attempted to don shoes but needs significant assist to don heels in shoes as well as to tie cloth laces. Sitting edge of bed pt needs high guard and max assist x 2 to transfer to chair with armrest removed. Pt limited by increased anxiety, extreme fear of falling. She is able to follow commands written for her due to impaired hearing. Current Level of Function Impacting Discharge (ADLs): max assist LB ADl's    Other factors to consider for discharge:          PLAN :  Patient continues to benefit from skilled intervention to address the above impairments. Continue treatment per established plan of care. to address goals.     Recommend with staff: bed in chair position for activity    Recommend next OT session: cont towards goals    Recommendation for discharge: (in order for the patient to meet his/her long term goals)  Therapy up to 5 days/week in SNF setting    This discharge recommendation:  Has not yet been discussed the attending provider and/or case management    IF patient discharges home will need the following DME: none       SUBJECTIVE:   Patient stated .    OBJECTIVE DATA SUMMARY:   Cognitive/Behavioral Status:  Neurologic State: Alert;Confused  Orientation Level: Oriented to person  Cognition: Decreased attention/concentration             Functional Mobility and Transfers for ADLs:  Bed Mobility:  Supine to Sit: Moderate assistance;Assist x2    Transfers:        Bed to Chair: Moderate assistance;Assist x2    Balance: fair sitting edge of bed       ADL Intervention:       Grooming  Brushing/Combing Hair: Stand-by assistance bed level                   Lower Body Dressing Assistance  Shoes with Cloth Laces: Maximum assistance  Leg Crossed Method Used: Yes  Position Performed: Long sitting on bed  Cues: Don;Physical assistance              Therapeutic Exercises:   Pt needs one on one visual demonstration of exercises and than difficulty following through with exercise. Activity Tolerance:   Fair  Please refer to the flowsheet for vital signs taken during this treatment.     After treatment patient left in no apparent distress:   Sitting in chair    COMMUNICATION/COLLABORATION:   The patients plan of care was discussed with: Physical Therapy Assistant, Occupational Therapist, and Registered Nurse    JOHN Callahan  Time Calculation: 20 mins

## 2019-12-28 PROCEDURE — 97535 SELF CARE MNGMENT TRAINING: CPT

## 2019-12-28 PROCEDURE — 96372 THER/PROPH/DIAG INJ SC/IM: CPT

## 2019-12-28 PROCEDURE — 97530 THERAPEUTIC ACTIVITIES: CPT

## 2019-12-28 PROCEDURE — 74011250636 HC RX REV CODE- 250/636: Performed by: INTERNAL MEDICINE

## 2019-12-28 PROCEDURE — 99218 HC RM OBSERVATION: CPT

## 2019-12-28 RX ADMIN — Medication 10 ML: at 16:08

## 2019-12-28 RX ADMIN — Medication 10 ML: at 05:33

## 2019-12-28 RX ADMIN — ENOXAPARIN SODIUM 40 MG: 40 INJECTION SUBCUTANEOUS at 08:53

## 2019-12-28 RX ADMIN — Medication: at 08:53

## 2019-12-28 RX ADMIN — Medication 10 ML: at 21:09

## 2019-12-28 NOTE — PROGRESS NOTES
[facility]  [address] [facility], [address] [place] [address]  [phone]      Medical Progress Note      NAME: Charlette Doyle   :  1940  MRM:  [id number]    Date/Time: 2019  10:39 AM           Subjective:     Chief Complaint:  Weak: patient cannot tell me why she is here or what has happened recently due to dementia    ROS:  (bold if positive, if negative)    Unable to obtain          Objective:       Vitals:          Last 24hrs VS reviewed since prior progress note.  Most recent are:    Visit Vitals  /80 (BP 1 Location: Left arm, BP Patient Position: At rest)   Pulse 63   Temp 98 °F (36.7 °C)   Resp 18   Ht 5' (1.524 m)   Wt 49.9 kg (110 lb)   SpO2 98%   Breastfeeding No   BMI 21.48 kg/m²     SpO2 Readings from Last 6 Encounters:   19 98%   19 99%   19 100%   19 99%   10/31/19 99%   10/13/19 98%            Intake/Output Summary (Last 24 hours) at 2019 1039  Last data filed at 2019 0956  Gross per 24 hour   Intake 540 ml   Output    Net 540 ml          Exam:     Physical Exam:    Gen:  Well-developed, well-nourished, frail, elderly, in no acute distress  HEENT:  Pink conjunctivae, PERRL, hearing intact to voice, moist mucous membranes  Neck:  Supple, without masses, thyroid non-tender  Resp:  No accessory muscle use, clear breath sounds without wheezes rales or rhonchi  Card:  No murmurs, normal S1, S2 without thrills, bruits or peripheral edema, 2+ pedal pulses  Abd:  Soft, non-tender, non-distended, normoactive bowel sounds are present, no palpable organomegaly and no detectable hernias  Lymph:  No cervical or inguinal adenopathy  Musc:  No cyanosis or clubbing  Skin:  No rashes or ulcers, skin turgor is good, cap refill <2 sec  Neuro:  Cranial nerves are grossly intact, no focal motor weakness, follows some commands appropriately  Psych:  Poor insight, oriented to person, but not place and time, alert       Medications Reviewed: (see below)    Lab Data Reviewed: (see below)    ______________________________________________________________________    Medications:     Current Facility-Administered Medications   Medication Dose Route Frequency    zinc oxide-cod liver oil (DESITIN) 40 % paste   Topical DAILY    sodium chloride (NS) flush 5-40 mL  5-40 mL IntraVENous Q8H    sodium chloride (NS) flush 5-40 mL  5-40 mL IntraVENous PRN    enoxaparin (LOVENOX) injection 40 mg  40 mg SubCUTAneous Q24H            Lab Review:     Recent Labs     12/27/19  0109   WBC 13.7*   HGB 13.4   HCT 40.4        Recent Labs     12/27/19 0109      K 4.4   *   CO2 24   *   BUN 13   CREA 0.47*   CA 8.4*     No results found for: GLUCPOC  No results for input(s): PH, PCO2, PO2, HCO3, FIO2 in the last 72 hours. No results for input(s): INR, INREXT, INREXT in the last 72 hours.   No results found for: SDES  Lab Results   Component Value Date/Time    Culture result: NO GROWTH 5 DAYS 12/15/2019 10:53 PM    Culture result: NO GROWTH 1 DAY 12/15/2019 08:03 PM            Assessment:     Principal Problem:    Weak (12/19/2019)    Active Problems:    Bilateral kidney stones (12/15/2019)      HTN (hypertension) (12/16/2019)      Dementia ([address] (12/19/2019)      Alessia Thomas (12/19/2019)           Plan:     Principal Problem:    Weak (12/19/2019)/Debility (12/19/2019)   - due to recent hospitalization   - attempting PT/OT, limited by patient's dementia   - SNF placement    Active Problems:    Bilateral kidney stones (12/15/2019)   - leukocytosis has resolved without antibiotics   - no acute intervention needed from , had stent last admission      HTN (hypertension) (12/16/2019)   - BP okay on meds as above      Dementia ([address] (12/19/2019)   - appears to be unable to care for herself      Total time spent in patient care: 15 minutes                  Care Plan discussed with: Patient, Care Manager and Nursing Staff    Discussed:  Code Status, Care Plan and D/C Planning    Prophylaxis:  Lovenox    Disposition:  SNF/LTC           ___________________________________________________    Attending Physician: Davy Posadas MD

## 2019-12-28 NOTE — PROGRESS NOTES
Spiritual Care Assessment/Progress Note  [address]      NAME: Bhaskar Almaraz      MRN: [id number]  AGE: 67 y.o.  SEX: female  Jainism Affiliation: Christopheribouti   Language: English     12/28/2019     Total Time (in minutes): 18     Spiritual Assessment begun in [facility]  MED SURG 2 through conversation with:         [x]Patient        [] Family    [] Friend(s)        Reason for Consult: Initial/Spiritual assessment, patient floor     Spiritual beliefs: (Please include comment if needed)     [x] Identifies with a elizabeth tradition:    Jewish      [] Supported by a elizabeth community:            [] Claims no spiritual orientation:           [] Seeking spiritual identity:                [] Adheres to an individual form of spirituality:           [] Not able to assess:                           Identified resources for coping:      [] Prayer                               [] Music                  [] Guided Imagery     [] Family/friends                 [] Pet visits     [] Devotional reading                         [] Unknown     [] Other:                                              Interventions offered during this visit: (See comments for more details)    Patient Interventions: Affirmation of elizabeth, Iconic (affirming the presence of God/Higher Power), Initial/Spiritual assessment, patient floor, Prayer (assurance of), Prayer (actual)           Plan of Care:     [] Support spiritual and/or cultural needs    [] Support AMD and/or advance care planning process      [] Support grieving process   [] Coordinate Rites and/or Rituals    [] Coordination with community clergy   [] No spiritual needs identified at this time   [] Detailed Plan of Care below (See Comments)  [] Make referral to Music Therapy  [] Make referral to Pet Therapy     [] Make referral to Addiction services  [] Make referral to [facility]  [] Make referral to Spiritual Care Partner  [] No future visits requested        [x] Follow up visits as needed     Comments: Initial spiritual assessment in Med Surg. Miss Subhash Mae is apparently very hard of hearing. She finally said to write it down. We were able to establish she has strong elizabeth in God and prays everyday. She welcomes prayer and when we bowed our head to pray she lifted up her  who has . Provided spiritual presence and prayer. She did not express any other needs at this time. Advised of  Availability.   Visited by: Mary Delogn., MS., [address] ([id number])

## 2019-12-28 NOTE — PROGRESS NOTES
Problem: Self Care Deficits Care Plan (Adult)  Goal: *Acute Goals and Plan of Care (Insert Text)  Description    FUNCTIONAL STATUS PRIOR TO ADMISSION: Patient was supervision for basic and instrumental ADLs. HOME SUPPORT: The patient lived alone with caregiver intermittently to provide assistance. Occupational Therapy Goals  Weekly re assessment goals 12/26/2019  1. Patient will perform lower body dressing with moderate within 7 day(s). 2.  Patient will perform grooming, seated EOB with minimum assistance  within 7 day(s). 3.  Patient will perform toilet transfers with moderate assistance within 7 day(s). 4.  Patient will perform all aspects of toileting with moderate assistance within 7 day(s). 5.  Patient will participate in upper extremity therapeutic exercise/activities with supervision/set-up for 5 minutes within 7 day(s). Initiated 12/19/2019  1. Patient will perform lower body dressing with supervision/set-up within 7 day(s). - downgraded  2. Patient will perform grooming, standing at sink, with supervision/set-up within 7 day(s). - downgraded   3. Patient will perform toilet transfers with supervision/set-up within 7 day(s). - downgraded   4. Patient will perform all aspects of toileting with supervision/set-up within 7 day(s). -downgraded   5. Patient will participate in upper extremity therapeutic exercise/activities with supervision/set-up for 5 minutes within 7 day(s). - progressing          Outcome: Progressing Towards Goal     OCCUPATIONAL THERAPY TREATMENT  Patient: Colin Valentin (44 y.o. female)  Date: 12/28/2019  Diagnosis: Weak [R53.1]  'light-for-dates' infant with signs of fetal malnutrition [P05.00]  Weakness [R53.1]   Weak       Precautions: Fall, WBAT  Chart, occupational therapy assessment, plan of care, and goals were reviewed. ASSESSMENT  Patient continues with skilled OT services and is slowly progressing towards goals.   Patient is limited by severe anxiety and fear of falling. This session, she participates in lower body bathing and toileting hygiene at bed-level due to inability to safely sit EOB and trial standing. She is able to cross LEs while supine/long sitting in bed but requires assist due to impaired coordination. Patient requires constant encouragement and assurance of safety and benefits from step by step cues for activity sequence (however this is limited due to very [facility]). At EOB, patient is able to sit with fair balance for a few seconds but becomes overwhelmed and begins to push posteriorly and wave her arms and for safety is returned to supine with max assist.       Current Level of Function Impacting Discharge (ADLs): up to max A ADLs for safety    Other factors to consider for discharge: severe anxiety/fear of falling         PLAN :  Patient continues to benefit from skilled intervention to address the above impairments. Continue treatment per established plan of care. to address goals. Recommend with staff: 2 person assist for all mobility; elevated HOB for meals; ADLs as able    Recommend next OT session: simple UE ADL; attempt EOB tolerance    Recommendation for discharge: (in order for the patient to meet his/her long term goals)  Therapy up to 5 days/week in SNF setting    This discharge recommendation:  Has been made in collaboration with the attending provider and/or case management    IF patient discharges home will need the following DME: to be determined (TBD)       SUBJECTIVE:   Patient stated Kiet [place]! Please don't let me fall! Help!    OBJECTIVE DATA SUMMARY:   Cognitive/Behavioral Status:  Neurologic State: Alert;Confused  Orientation Level: Oriented to person  Cognition: Decreased attention/concentration;Poor safety awareness; Impulsive; Impaired decision making  Perception: (hearing loss)  Perseveration: No perseveration noted  Safety/Judgement: Decreased awareness of environment;Decreased awareness of need for assistance;Decreased awareness of need for safety    Functional Mobility and Transfers for ADLs:  Bed Mobility:  Supine to Sit: Moderate assistance(pt very anxious and fearful)  Sit to Supine: Maximum assistance;Assist x2(due to patient anxiety)    Transfers:  Not performed secondary to unsafe behavior at EOB  Balance:  Sitting: Impaired; Without support(pt very anxious, resulting in extra movement and pushing)  Sitting - Static: Fair (occasional)    ADL Intervention:    Lower Body Bathing  Perineal  : Minimum assistance  Position Performed: Supine(with HOB slighlty raised)  Cues: Tactile cues provided;Verbal cues provided;Visual cues provided(physsical assist for thoroughness)  Lower Body : Moderate assistance(assist for distal LEs)  Position Performed: Long sitting on bed;Supine  Cues: Physical assistance; Tactile cues provided;Verbal cues provided;Visual cues provided    Upper [address]: Minimum  assistance  Cues: Physical assistance; Tactile cues provided;Visual cues provided    Lower Body Dressing Assistance  Socks: Minimum assistance(for orientation; pt able to don with SBA)  Shoes with Cloth Laces: Maximum assistance(pt able to get over toes but assist for heel and for laces)  Leg Crossed Method Used: Yes  Position Performed: Supine(able to bring LEs up to her; fearful of falling forwards)  Cues: Don;Physical assistance; Tactile cues provided;Verbal cues provided;Visual cues provided    Toileting  Bladder Hygiene: Minimum assistance(performed in supine)  Clothing Management: Minimum assistance  Cues: Tactile cues provided;Verbal cues provided;Visual cues provided(physical assistance)    Cognitive Retraining  Safety/Judgement: Decreased awareness of environment;Decreased awareness of need for assistance;Decreased awareness of need for safety    Pain:  Pt reporting minimal pain    Activity Tolerance:   Fair  Please refer to the flowsheet for vital signs taken during this treatment.     After treatment patient left in no apparent distress:   Supine in bed, Call bell within reach, Bed / chair alarm activated, Side rails x 3 and RN notified    COMMUNICATION/COLLABORATION:   The patients plan of care was discussed with: Physical Therapist and Registered Nurse    Erick Owens OT  Time Calculation: 38 mins

## 2019-12-28 NOTE — PROGRESS NOTES
Bedside and Verbal shift change report given to Cara Roberts (oncoming nurse) by Hugo Kirby (offgoing nurse). Report included the following information SBAR, Kardex, Intake/Output, Accordion and Recent Results.

## 2019-12-28 NOTE — PROGRESS NOTES
Bedside shift change report given to Patricia Arrington, RN (oncoming nurse) by Inés Bettencourt (offgoing nurse). Report included the following information SBAR, Kardex, MAR and Accordion.    Inés Bettencourt

## 2019-12-28 NOTE — PROGRESS NOTES
Problem: Pressure Injury - Risk of  Goal: *Prevention of pressure injury  Description  Document David Scale and appropriate interventions in the flowsheet. Outcome: Progressing Towards Goal  Note: Pressure Injury Interventions:  Sensory Interventions: Assess need for specialty bed, Avoid rigorous massage over bony prominences, Check visual cues for pain    Moisture Interventions: Apply protective barrier, creams and emollients, Assess need for specialty bed, Check for incontinence Q2 hours and as needed, Minimize layers, Offer toileting Q_hr    Activity Interventions: Increase time out of bed, Pressure redistribution bed/mattress(bed type), PT/OT evaluation    Mobility Interventions: HOB 30 degrees or less, Pressure redistribution bed/mattress (bed type), PT/OT evaluation, Turn and reposition approx. every two hours(pillow and wedges)    Nutrition Interventions: Document food/fluid/supplement intake, Discuss nutritional consult with provider, Offer support with meals,snacks and hydration    Friction and Shear Interventions: Apply protective barrier, creams and emollients, Foam dressings/transparent film/skin sealants, HOB 30 degrees or less, Lift team/patient mobility team, Minimize layers                Problem: Patient Education: Go to Patient Education Activity  Goal: Patient/Family Education  Outcome: Progressing Towards Goal     Problem: Falls - Risk of  Goal: *Absence of Falls  Description  Document Giovana Jaocbo Fall Risk and appropriate interventions in the flowsheet.   Outcome: Progressing Towards Goal  Note: Fall Risk Interventions:  Mobility Interventions: Bed/chair exit alarm, Communicate number of staff needed for ambulation/transfer, Patient to call before getting OOB    Mentation Interventions: Bed/chair exit alarm, Door open when patient unattended, More frequent rounding, Reorient patient, Toileting rounds    Medication Interventions: Bed/chair exit alarm, Patient to call before getting OOB, Teach patient to arise slowly, Utilize gait belt for transfers/ambulation    Elimination Interventions: Bed/chair exit alarm, Call light in reach, Patient to call for help with toileting needs, Stay With Me (per policy), Toileting schedule/hourly rounds    History of Falls Interventions: Bed/chair exit alarm, Door open when patient unattended, Investigate reason for fall, Room close to nurse's station         Problem: Patient Education: Go to Patient Education Activity  Goal: Patient/Family Education  Outcome: Progressing Towards Goal     Problem: Patient Education: Go to Patient Education Activity  Goal: Patient/Family Education  Outcome: Progressing Towards Goal     Problem: Patient Education: Go to Patient Education Activity  Goal: Patient/Family Education  Outcome: Progressing Towards Goal     Problem: Patient Education: Go to Patient Education Activity  Goal: Patient/Family Education  Outcome: Progressing Towards Goal

## 2019-12-29 PROCEDURE — 74011250636 HC RX REV CODE- 250/636: Performed by: INTERNAL MEDICINE

## 2019-12-29 PROCEDURE — 93005 ELECTROCARDIOGRAM TRACING: CPT

## 2019-12-29 PROCEDURE — 96372 THER/PROPH/DIAG INJ SC/IM: CPT

## 2019-12-29 PROCEDURE — 99218 HC RM OBSERVATION: CPT

## 2019-12-29 RX ADMIN — Medication 10 ML: at 21:01

## 2019-12-29 RX ADMIN — Medication: at 11:03

## 2019-12-29 RX ADMIN — ENOXAPARIN SODIUM 40 MG: 40 INJECTION SUBCUTANEOUS at 11:02

## 2019-12-29 NOTE — PROGRESS NOTES
Bedside shift change report given to Ezekiel Walker RN (oncoming nurse) by [address] [place] (offgoing nurse). Report included the following information SBAR, Kardex, MAR and Accordion.    [address] [place] [address]

## 2019-12-29 NOTE — PROGRESS NOTES
Omar Naik johnathan [place] [address]  [address] [facility], [address] [place] [address]  [phone]      Medical Progress Note      NAME: Meme Santiago   :  1940  MRM:  [id number]    Date/Time: 2019  10:21 AM            Subjective:     Chief Complaint:  Weak: patient cannot tell me why she is here or what has happened recently due to dementia    ROS:  (bold if positive, if negative)    Unable to obtain          Objective:       Vitals:          Last 24hrs VS reviewed since prior progress note.  Most recent are:    Visit Vitals  /51 (BP 1 Location: Left arm, BP Patient Position: At rest)   Pulse 79   Temp 97.9 °F (36.6 °C)   Resp 18   Ht 5' (1.524 m)   Wt 49.9 kg (110 lb)   SpO2 95%   Breastfeeding No   BMI 21.48 kg/m²     SpO2 Readings from Last 6 Encounters:   19 95%   19 99%   19 100%   19 99%   10/31/19 99%   10/13/19 98%            Intake/Output Summary (Last 24 hours) at 2019 1021  Last data filed at 2019 1753  Gross per 24 hour   Intake 240 ml   Output    Net 240 ml          Exam:     Physical Exam:    Gen:  Well-developed, well-nourished, frail, elderly, in no acute distress  HEENT:  Pink conjunctivae, PERRL, hearing intact to voice, moist mucous membranes  Neck:  Supple, without masses, thyroid non-tender  Resp:  No accessory muscle use, clear breath sounds without wheezes rales or rhonchi  Card:  No murmurs, normal S1, S2 without thrills, bruits or peripheral edema, 2+ pedal pulses  Abd:  Soft, non-tender, non-distended, normoactive bowel sounds are present, no palpable organomegaly and no detectable hernias  Lymph:  No cervical or inguinal adenopathy  Musc:  No cyanosis or clubbing  Skin:  No rashes or ulcers, skin turgor is good, cap refill <2 sec  Neuro:  Cranial nerves are grossly intact, no focal motor weakness, follows some commands appropriately  Psych:  Poor insight, oriented to person, but not place and time, alert       Medications Reviewed: (see below)    Lab Data Reviewed: (see below)    ______________________________________________________________________    Medications:     Current Facility-Administered Medications   Medication Dose Route Frequency    zinc oxide-cod liver oil (DESITIN) 40 % paste   Topical DAILY    sodium chloride (NS) flush 5-40 mL  5-40 mL IntraVENous Q8H    sodium chloride (NS) flush 5-40 mL  5-40 mL IntraVENous PRN    enoxaparin (LOVENOX) injection 40 mg  40 mg SubCUTAneous Q24H            Lab Review:     Recent Labs     12/27/19  0109   WBC 13.7*   HGB 13.4   HCT 40.4        Recent Labs     12/27/19  0109      K 4.4   *   CO2 24   *   BUN 13   CREA 0.47*   CA 8.4*     No results found for: GLUCPOC  No results for input(s): PH, PCO2, PO2, HCO3, FIO2 in the last 72 hours. No results for input(s): INR, INREXT, INREXT in the last 72 hours.   No results found for: SDES  Lab Results   Component Value Date/Time    Culture result: NO GROWTH 5 DAYS 12/15/2019 10:53 PM    Culture result: NO GROWTH 1 DAY 12/15/2019 08:03 PM            Assessment:     Principal Problem:    Weak (12/19/2019)    Active Problems:    Bilateral kidney stones (12/15/2019)      HTN (hypertension) (12/16/2019)      Dementia ([address] (12/19/2019)      Dea Contreras (12/19/2019)           Plan:     Principal Problem:    Weak (12/19/2019)/Debility (12/19/2019)   - due to recent hospitalization   - attempting PT/OT, limited by patient's dementia   - SNF placement    Active Problems:    Bilateral kidney stones (12/15/2019)   - leukocytosis has resolved without antibiotics   - no acute intervention needed from , had stent last admission      HTN (hypertension) (12/16/2019)   - BP okay on meds as above      Dementia ([address] (12/19/2019)   - appears to be unable to care for herself      Total time spent in patient care: 15 minutes                  Care Plan discussed with: Patient, Care Manager and Nursing Staff    Discussed:  Code Status, Care Plan and D/C Planning    Prophylaxis:  Lovenox    Disposition:  SNF/LTC           ___________________________________________________    Attending Physician: Israel Santiago MD

## 2019-12-30 LAB
ATRIAL RATE: 71 BPM
CALCULATED P AXIS, ECG09: 62 DEGREES
CALCULATED R AXIS, ECG10: 55 DEGREES
CALCULATED T AXIS, ECG11: 45 DEGREES
DIAGNOSIS, 93000: NORMAL
P-R INTERVAL, ECG05: 100 MS
Q-T INTERVAL, ECG07: 382 MS
QRS DURATION, ECG06: 80 MS
QTC CALCULATION (BEZET), ECG08: 415 MS
VENTRICULAR RATE, ECG03: 71 BPM

## 2019-12-30 PROCEDURE — 99218 HC RM OBSERVATION: CPT

## 2019-12-30 PROCEDURE — 74011250637 HC RX REV CODE- 250/637: Performed by: INTERNAL MEDICINE

## 2019-12-30 PROCEDURE — 96372 THER/PROPH/DIAG INJ SC/IM: CPT

## 2019-12-30 PROCEDURE — 74011250636 HC RX REV CODE- 250/636: Performed by: INTERNAL MEDICINE

## 2019-12-30 PROCEDURE — 92526 ORAL FUNCTION THERAPY: CPT

## 2019-12-30 RX ORDER — NYSTATIN 100000 [USP'U]/G
POWDER TOPICAL 2 TIMES DAILY
Status: DISCONTINUED | OUTPATIENT
Start: 2019-12-30 | End: 2020-01-06 | Stop reason: HOSPADM

## 2019-12-30 RX ADMIN — Medication 10 ML: at 21:09

## 2019-12-30 RX ADMIN — Medication: at 09:55

## 2019-12-30 RX ADMIN — NYSTATIN: 100000 POWDER TOPICAL at 18:40

## 2019-12-30 RX ADMIN — ENOXAPARIN SODIUM 40 MG: 40 INJECTION SUBCUTANEOUS at 09:55

## 2019-12-30 NOTE — PROGRESS NOTES
Wound care consult/Follow up:  Patient is sitting up in bed reading her newspaper. She turns and repositions with minimal assist. Skin assessment finds a decrease in erythema to the gluteal areas with noted scattered satellite lesions (likely yeast). Patient is incontinent of urine on assessment. Cleansed and applied zinc barrier to the bear/gluteal areas. Recommendation:  Consider nystatin to the gluteal/bear-areas BID.      Consult as needed,   Ranulfo Arroyo BSN RN [facility], [facility].

## 2019-12-30 NOTE — PROGRESS NOTES
12- CASE MANAGEMENT NOTE:  I was able to speak with Lito at [facility] ([phone]-Prompts 3, 8, 1), to ask about the status of the need for a Level II assessment for this patient and explained that the requested paperwork had been faxed to them on 12-. He informed me that they have a \"lot of people out of town and have not been able to get to it yet\". He said he will try to look at it by tomorrow but not sure if he will be able to. I have informed [facility] of this and will continue to follow.  Terra Gonzalez, BSW, CM

## 2019-12-30 NOTE — PROGRESS NOTES
Incontinence care provided around 1800. Pt thrashing in bed due to fear of falling. After repositioning pt, she became tearful and starting complaining of chest pain. Pt clutched chest and said \"It hurts. It hurts right here. \" Requested EKG from Dr. Radha Gillette. No acute changes from previous EKG. 1900 Pt resting comfortably in bed. Bedside and Verbal shift change report given to Lucian Yung (oncoming nurse) by Paula Grant (offgoing nurse). Report included the following information SBAR, Kardex, Intake/Output, MAR and Accordion.

## 2019-12-30 NOTE — PROGRESS NOTES
Omar Naik johnathan [place] [address]  [address] [facility], [address] [place] [address]  [phone]      Medical Progress Note      NAME: Magali Lopez   :  1940  MRM:  [id number]    Date/Time: 2019  11:32 AM            Subjective:     Chief Complaint:  Weak: patient cannot tell me why she is here or what has happened recently due to dementia    ROS:  (bold if positive, if negative)    Unable to obtain          Objective:       Vitals:          Last 24hrs VS reviewed since prior progress note.  Most recent are:    Visit Vitals  /72 (BP 1 Location: Left arm, BP Patient Position: At rest)   Pulse 86   Temp 97.7 °F (36.5 °C)   Resp 16   Ht 5' (1.524 m)   Wt 49.9 kg (110 lb)   SpO2 98%   Breastfeeding No   BMI 21.48 kg/m²     SpO2 Readings from Last 6 Encounters:   19 98%   19 99%   19 100%   19 99%   10/31/19 99%   10/13/19 98%          No intake or output data in the 24 hours ending 19 1132       Exam:     Physical Exam:    Gen:  Well-developed, well-nourished, frail, elderly, in no acute distress  HEENT:  Pink conjunctivae, PERRL, hearing intact to voice, moist mucous membranes  Neck:  Supple, without masses, thyroid non-tender  Resp:  No accessory muscle use, clear breath sounds without wheezes rales or rhonchi  Card:  No murmurs, normal S1, S2 without thrills, bruits or peripheral edema, 2+ pedal pulses  Abd:  Soft, non-tender, non-distended, normoactive bowel sounds are present, no palpable organomegaly and no detectable hernias  Lymph:  No cervical or inguinal adenopathy  Musc:  No cyanosis or clubbing  Skin:  No rashes or ulcers, skin turgor is good, cap refill <2 sec  Neuro:  Cranial nerves are grossly intact, no focal motor weakness, follows some commands appropriately  Psych:  Poor insight, oriented to person, but not place and time, alert       Medications Reviewed: (see below)    Lab Data Reviewed: (see below)    ______________________________________________________________________    Medications:     Current Facility-Administered Medications   Medication Dose Route Frequency    zinc oxide-cod liver oil (DESITIN) 40 % paste   Topical DAILY    sodium chloride (NS) flush 5-40 mL  5-40 mL IntraVENous Q8H    sodium chloride (NS) flush 5-40 mL  5-40 mL IntraVENous PRN    enoxaparin (LOVENOX) injection 40 mg  40 mg SubCUTAneous Q24H            Lab Review:     No results for input(s): WBC, HGB, HCT, PLT, HGBEXT, HCTEXT, PLTEXT, HGBEXT, HCTEXT, PLTEXT in the last 72 hours. No results for input(s): NA, K, CL, CO2, GLU, BUN, CREA, CA, MG, PHOS, ALB, TBIL, TBILI, SGOT, ALT, INR, INREXT, INREXT in the last 72 hours. No results found for: GLUCPOC  No results for input(s): PH, PCO2, PO2, HCO3, FIO2 in the last 72 hours. No results for input(s): INR, INREXT, INREXT in the last 72 hours.   No results found for: SDES  Lab Results   Component Value Date/Time    Culture result: NO GROWTH 5 DAYS 12/15/2019 10:53 PM    Culture result: NO GROWTH 1 DAY 12/15/2019 08:03 PM            Assessment:     Principal Problem:    Weak (12/19/2019)    Active Problems:    Bilateral kidney stones (12/15/2019)      HTN (hypertension) (12/16/2019)      Dementia ([address] (12/19/2019)      Juan Stinson (12/19/2019)           Plan:     Principal Problem:    Weak (12/19/2019)/Debility (12/19/2019)   - due to recent hospitalization   - attempting PT/OT, limited by patient's dementia   - SNF placement    Active Problems:    Bilateral kidney stones (12/15/2019)   - leukocytosis has resolved without antibiotics   - no acute intervention needed from , had stent last admission      HTN (hypertension) (12/16/2019)   - BP okay on meds as above   - chest pain yesterday, unchanged EKG, not cardiac      Dementia ([address] (12/19/2019)   - appears to be unable to care for herself      Total time spent in patient care: 15 minutes                  Care Plan discussed with: Patient, Care Manager and Nursing Staff    Discussed:  Code Status, Care Plan and D/C Planning    Prophylaxis:  Lovenox    Disposition:  SNF/LTC           ___________________________________________________    Attending Physician: Love Glez MD

## 2019-12-30 NOTE — PROGRESS NOTES
Problem: Dysphagia (Adult)  Goal: *Acute Goals and Plan of Care (Insert Text)  Description  Speech pathology goals  Initiated 12/19/2019  1. Patient will tolerate regular/thin liquid diet with no overt s/s aspiration within 7 days -discontinued  2) tolerate dysphagia 2, thins without s/s aspiration or c/o pain with swallowing in chest by 12-23-19 ; continue to 12-26-19 ; continue to 1-2-20   Outcome: Progressing Towards Goal   SPEECH LANGUAGE PATHOLOGY DYSPHAGIA TREATMENT: WEEKLY REASSESSMENT  Patient: Meme Santiago (57 y.o. female)  Date: 12/30/2019  Diagnosis: Weak [R53.1]  'light-for-dates' infant with signs of fetal malnutrition [P05.00]  Weakness [R53.1]   Weak       Precautions: aspiration Fall, WBAT    ASSESSMENT:  Patient had 1 instance of chest pain a few hours after eating again. SPoke with RN: possibly GERD vs anxiety attack. Patient still shows decreased chew and has risk for esophageal backup based on sedentary status and c/o chest pain afte meals. She should remain on dysphagia 2 (minced diet) permanently to keep food particle size small and reduce the chew load. Patient's progression toward goals since last assessment: Fewer instances of chest pain with food since diet downgraded. Still has some intermittently. PLAN:  Goals have been updated based on progression since last assessment. Patient continues to benefit from skilled intervention to address the above impairments. Continue to follow the patient 1 time a week to address goals. Recommendations and Planned Interventions:  Discharge to SNF on dysphagia 2, thins for esophageal transit purposes. Discharge Recommendations: Skilled Nursing Facility     SUBJECTIVE:   Patient answered a written question did your chest hurt after eating today? \" with head shake \"NO\".     OBJECTIVE:   Cognitive and Communication Status:  Neurologic State: Alert  Orientation Level: Oriented to person  Cognition: Recognition of people/places, Memory loss, Decreased attention/concentration  Perception: (hearing loss)  Perseveration: No perseveration noted  Safety/Judgement: Decreased awareness of environment, Decreased awareness of need for assistance, Decreased awareness of need for safety  Dysphagia Treatment:  Oral Assessment:     P.O. Trials:  Patient Position: up in bed  Vocal quality prior to P.O.: No impairment  Consistency Presented: (dysphagia 2, thins)  How Presented: Self-fed/presented;Straw;Successive swallows;Spoon   ORAL PHASE:  Bolus Acceptance: No impairment  Bolus Formation/Control: No impairment     Propulsion: No impairment  Oral Residue: None      PHARYNGEAL PHASE:   Aspiration Signs/Symptoms: None    RN reports that patient has no pills to take this am.   She reported that patient was crying last night that her chest hurt and saying \"Everyone hates me\". She was laying down and thrashing around in the bed. Exercises:  Laryngeal Exercises:                                                                                                                                   Pain:  Pain Scale 1: Numeric (0 - 10)  Pain Intensity 1: 0       After treatment patient left in no apparent distress:   Patient left in no apparent distress in bed, Call bell within reach, and Nursing notified    COMMUNICATION/EDUCATION:   Patient was educated regarding her deficit(s) of dysphagia  as this relates to her diagnosis of weakness. She demonstrated Guarded understanding as evidenced by mental status. .    The patients plan of care including recommendations, planned interventions, and recommended diet changes were discussed with: Registered Nurse.     JOSE A Vuong  Time Calculation: 15 mins

## 2019-12-31 PROCEDURE — 96372 THER/PROPH/DIAG INJ SC/IM: CPT

## 2019-12-31 PROCEDURE — 74011250637 HC RX REV CODE- 250/637: Performed by: INTERNAL MEDICINE

## 2019-12-31 PROCEDURE — 74011250636 HC RX REV CODE- 250/636: Performed by: INTERNAL MEDICINE

## 2019-12-31 PROCEDURE — 99218 HC RM OBSERVATION: CPT

## 2019-12-31 RX ADMIN — ENOXAPARIN SODIUM 40 MG: 40 INJECTION SUBCUTANEOUS at 09:07

## 2019-12-31 RX ADMIN — Medication: at 09:07

## 2019-12-31 RX ADMIN — NYSTATIN: 100000 POWDER TOPICAL at 09:07

## 2019-12-31 RX ADMIN — Medication 10 ML: at 14:00

## 2019-12-31 RX ADMIN — Medication 10 ML: at 22:47

## 2019-12-31 RX ADMIN — Medication 10 ML: at 06:07

## 2019-12-31 RX ADMIN — NYSTATIN: 100000 POWDER TOPICAL at 18:00

## 2019-12-31 NOTE — PROGRESS NOTES
Problem: Pressure Injury - Risk of  Goal: *Prevention of pressure injury  Description  Document David Scale and appropriate interventions in the flowsheet. Outcome: Progressing Towards Goal  Note: Pressure Injury Interventions:  Sensory Interventions: Assess changes in LOC, Assess need for specialty bed, Check visual cues for pain, Keep linens dry and wrinkle-free, Minimize linen layers, Turn and reposition approx. every two hours (pillows and wedges if needed)    Moisture Interventions: Absorbent underpads, Assess need for specialty bed, Check for incontinence Q2 hours and as needed, Limit adult briefs, Maintain skin hydration (lotion/cream), Minimize layers    Activity Interventions: Assess need for specialty bed, Increase time out of bed, PT/OT evaluation    Mobility Interventions: Assess need for specialty bed, PT/OT evaluation, Turn and reposition approx. every two hours(pillow and wedges)    Nutrition Interventions: Document food/fluid/supplement intake, Offer support with meals,snacks and hydration    Friction and Shear Interventions: Apply protective barrier, creams and emollients, Lift sheet, Lift team/patient mobility team, Minimize layers, Transferring/repositioning devices, Foam dressings/transparent film/skin sealants                Problem: Patient Education: Go to Patient Education Activity  Goal: Patient/Family Education  Outcome: Progressing Towards Goal     Problem: Falls - Risk of  Goal: *Absence of Falls  Description  Document Lakshmi Rizo Fall Risk and appropriate interventions in the flowsheet.   Outcome: Progressing Towards Goal  Note: Fall Risk Interventions:  Mobility Interventions: Bed/chair exit alarm, Communicate number of staff needed for ambulation/transfer, Patient to call before getting OOB, Utilize walker, cane, or other assistive device    Mentation Interventions: Adequate sleep, hydration, pain control, Bed/chair exit alarm, Evaluate medications/consider consulting pharmacy, Familiar objects from home, More frequent rounding, Room close to nurse's station, Update white board    Medication Interventions: Assess postural VS orthostatic hypotension, Bed/chair exit alarm, Patient to call before getting OOB, Teach patient to arise slowly    Elimination Interventions: Bed/chair exit alarm, Call light in reach, Patient to call for help with toileting needs, Toileting schedule/hourly rounds    History of Falls Interventions: Consult care management for discharge planning, Door open when patient unattended, Investigate reason for fall, Utilize gait belt for transfer/ambulation, Bed/chair exit alarm         Problem: Patient Education: Go to Patient Education Activity  Goal: Patient/Family Education  Outcome: Progressing Towards Goal     Problem: Patient Education: Go to Patient Education Activity  Goal: Patient/Family Education  Outcome: Progressing Towards Goal     Problem: Patient Education: Go to Patient Education Activity  Goal: Patient/Family Education  Outcome: Progressing Towards Goal     Problem: Patient Education: Go to Patient Education Activity  Goal: Patient/Family Education  Outcome: Progressing Towards Goal

## 2019-12-31 NOTE — ROUTINE PROCESS
Bedside shift change report given to Marlon Patel RN (oncoming nurse) by Kwesi Cox RN (offgoing nurse). Report included the following information SBAR, Kardex, Intake/Output, Recent Results and Quality Measures.

## 2019-12-31 NOTE — PROGRESS NOTES
Omar Naik johnathan [facility]  [address] [facility], [address] [place] [address]  [phone]      Medical Progress Note      NAME: Marie Zamuido   :  1940  MRM:  [id number]    Date/Time: 2019  12:29 PM          Subjective:     Chief Complaint:  Weak: patient cannot tell me why she is here or what has happened recently due to dementia    ROS:  (bold if positive, if negative)    Unable to obtain          Objective:       Vitals:          Last 24hrs VS reviewed since prior progress note.  Most recent are:    Visit Vitals  /71 (BP 1 Location: Right arm, BP Patient Position: At rest)   Pulse 68   Temp 97.8 °F (36.6 °C)   Resp 17   Ht 5' (1.524 m)   Wt 49.9 kg (110 lb)   SpO2 99%   Breastfeeding No   BMI 21.48 kg/m²     SpO2 Readings from Last 6 Encounters:   19 99%   19 99%   19 100%   19 99%   10/31/19 99%   10/13/19 98%          No intake or output data in the 24 hours ending 19 1229       Exam:     Physical Exam:    Gen:  Well-developed, well-nourished, frail, elderly, in no acute distress  HEENT:  Pink conjunctivae, PERRL, hearing intact to voice, moist mucous membranes  Neck:  Supple, without masses, thyroid non-tender  Resp:  No accessory muscle use, clear breath sounds without wheezes rales or rhonchi  Card:  No murmurs, normal S1, S2 without thrills, bruits or peripheral edema, 2+ pedal pulses  Abd:  Soft, non-tender, non-distended, normoactive bowel sounds are present, no palpable organomegaly and no detectable hernias  Lymph:  No cervical or inguinal adenopathy  Musc:  No cyanosis or clubbing  Skin:  No rashes or ulcers, skin turgor is good, cap refill <2 sec  Neuro:  Cranial nerves are grossly intact, no focal motor weakness, follows some commands appropriately  Psych:  Poor insight, oriented to person, but not place and time, alert       Medications Reviewed: (see below)    Lab Data Reviewed: (see below)    ______________________________________________________________________    Medications:     Current Facility-Administered Medications   Medication Dose Route Frequency    nystatin (MYCOSTATIN) 100,000 unit/gram powder   Topical BID    zinc oxide-cod liver oil (DESITIN) 40 % paste   Topical DAILY    sodium chloride (NS) flush 5-40 mL  5-40 mL IntraVENous Q8H    sodium chloride (NS) flush 5-40 mL  5-40 mL IntraVENous PRN    enoxaparin (LOVENOX) injection 40 mg  40 mg SubCUTAneous Q24H            Lab Review:     No results for input(s): WBC, HGB, HCT, PLT, HGBEXT, HCTEXT, PLTEXT, HGBEXT, HCTEXT, PLTEXT in the last 72 hours. No results for input(s): NA, K, CL, CO2, GLU, BUN, CREA, CA, MG, PHOS, ALB, TBIL, TBILI, SGOT, ALT, INR, INREXT, INREXT in the last 72 hours. No results found for: GLUCPOC  No results for input(s): PH, PCO2, PO2, HCO3, FIO2 in the last 72 hours. No results for input(s): INR, INREXT, INREXT in the last 72 hours.   No results found for: SDES  Lab Results   Component Value Date/Time    Culture result: NO GROWTH 5 DAYS 12/15/2019 10:53 PM    Culture result: NO GROWTH 1 DAY 12/15/2019 08:03 PM            Assessment:     Principal Problem:    Weak (12/19/2019)    Active Problems:    Bilateral kidney stones (12/15/2019)      HTN (hypertension) (12/16/2019)      Dementia ([address] (12/19/2019)      Colleen Whalen (12/19/2019)           Plan:     Principal Problem:    Weak (12/19/2019)/Debility (12/19/2019)   - due to recent hospitalization   - attempting PT/OT, limited by patient's dementia   - SNF placement    Active Problems:    Bilateral kidney stones (12/15/2019)   - leukocytosis has resolved without antibiotics   - no acute intervention needed from , had stent last admission      HTN (hypertension) (12/16/2019)   - BP okay on meds as above   - chest pain yesterday, unchanged EKG, not cardiac      Dementia ([facility] (12/19/2019)   - appears to be unable to care for herself      Total time spent in patient care: 15 minutes                  Care Plan discussed with: Patient, Care Manager and Nursing Staff    Discussed:  Code Status, Care Plan and D/C Planning    Prophylaxis:  Lovenox    Disposition:  SNF/LTC           ___________________________________________________    Attending Physician: Tamiko Cortez MD

## 2019-12-31 NOTE — PROGRESS NOTES
Bedside and Verbal shift change report given to Praveen Modi (oncoming nurse) by Matthew Amanda (offgoing nurse). Report included the following information SBAR, Kardex, Intake/Output, MAR and Accordion.

## 2019-12-31 NOTE — PROGRESS NOTES
Nutrition Assessment:    RECOMMENDATIONS/INTERVENTION(S):   1. Continue with Mechanical Soft diet order per SLP. Will continue to monitor PO intakes, weight, labs. ASSESSMENT:   12/31: F/u. No family in room. Met with pt in room. 75% breakfast tray in room eaten so far and pt still eating. Recorded intakes %. Stage 1 sacral PI noted. Will add Ensure HP 1x/day for pt to try to promote adequate protein intake. Will continue to monitor intakes. Labs and meds reviewed. 12/24: 66 yo female admitted for weakness. RD assessment for low BMI. PMhx: HTN, chronic diarrhea, dementia. Underweight per BMI per advanced age. Weight hx in EMR indicates weight has been stable PTA. Pt working with PT at time of visit, no family in room. Unable to obtain nutrition hx. Mechanical Soft diet ordered per SLP. PO intakes documented as % most meals since admission. Labs and meds reviewed. No open wounds. Diet Order: Mechanical soft  % Eaten:    Patient Vitals for the past 168 hrs:   % Diet Eaten   12/28/19 1753 85 %   12/28/19 0956 100 %   12/26/19 0946 95 %   12/24/19 1057 100 %       Pertinent Medications: [x] Reviewed    Labs: [x] Reviewed    Anthropometrics: Height: 5' (152.4 cm) Weight: 49.9 kg (110 lb)    IBW (%IBW):   ( ) UBW (%UBW):   (  %)      BMI: Body mass index is 21.48 kg/m². This BMI is indicative of:   [x] Underweight   - per age  [] Normal    [] Overweight    []  Obesity    []  Extreme Obesity (BMI>40)  Estimated Nutrition Needs (Based on): 5754 Kcals/day( x AF 1.3 + 250) , 60 g(60-70gm (1.2-1.4gm/kg/d)) Protein  Carbohydrate:  At Least 130 g/day  Fluids: 1419mL/day (1 ml/kcal)    Last BM: 12/29   [x]Active     []Hyperactive  []Hypoactive       [] Absent   BS  Skin:    [] Intact   [] Incision  [x] Breakdown: stage 1 sacral PI   [] DTI   [] Tears/Excoriation/Abrasion  []Edema [x] Other: erythema to heels     Wt Readings from Last 30 Encounters:   12/18/19 49.9 kg (110 lb)   12/16/19 50 kg (110 lb 3.7 oz)   12/08/19 49.9 kg (110 lb)   10/31/19 49.9 kg (110 lb)   10/13/19 49.9 kg (110 lb)   10/19/15 48.8 kg (107 lb 8 oz)   04/22/14 59.4 kg (131 lb)      NUTRITION DIAGNOSES:   Problem:  Underweight     Etiology: related to decreased ability to consume sufficient energy to maintain appropriate weight     Signs/Symptoms: as evidenced by BMI 21.5 (age > 65 years)      NUTRITION INTERVENTIONS:  Meals/Snacks: General/healthful diet                  GOAL:   PO intakes > 75% all meals to promote weight gain of 0.5-1lb within next 5-7 days    Cultural, Moravian, or Ethnic Dietary Needs: None     EDUCATION & DISCHARGE NEEDS:    [x] None Identified   [] Identified and Education Provided/Documented   [] Identified and Pt declined/was not appropriate      [x] Interdisciplinary Care Plan Reviewed/Documented    [x] Discharge Needs:   Mechanical Soft diet   [] No Nutrition Related Discharge Needs    NUTRITION RISK:   Pt Is At Nutrition Risk  [x]     No Nutrition Risk Identified  []       PT SEEN FOR:    []  MD Consult: []Calorie Count      []Diabetic Diet Education        []Diet Education     []Electrolyte Management     []General Nutrition Management and Supplements     []Management of Tube Feeding     []TPN Recommendations    []  RN Referral:  []MST score >=2     []Enteral/Parenteral Nutrition PTA     []Pregnant: Gestational DM or Multigestation                 [] Pressure Ulcer    []  Low BMI      []  Length of Stay       [] Dysphagia Diet         [] Ventilator  [x]  Follow-up     Previous Recommendations:   [x] Implemented          [] Not Implemented          [] Not Applicable    Previous Goal:   [] Met              [x] Progressing Towards Goal              [] Not Progressing Towards Goal   [] Not Applicable            Cathy Gibbons, [address] [place] [address]  Pager [phone]  Phone [phone]

## 2019-12-31 NOTE — PROGRESS NOTES
12- CASE MANAGEMENT NOTE:  I left a message for Caity ([phone]) requesting a call-back regarding the need for a Level II screening for this patient to allow her to be placed in a nursing home. CM will continue to follow.  CARLO Morel, CM

## 2020-01-01 PROCEDURE — 74011250636 HC RX REV CODE- 250/636: Performed by: INTERNAL MEDICINE

## 2020-01-01 PROCEDURE — 99218 HC RM OBSERVATION: CPT

## 2020-01-01 PROCEDURE — 96372 THER/PROPH/DIAG INJ SC/IM: CPT

## 2020-01-01 PROCEDURE — 74011250637 HC RX REV CODE- 250/637: Performed by: INTERNAL MEDICINE

## 2020-01-01 RX ADMIN — Medication: at 08:41

## 2020-01-01 RX ADMIN — Medication 10 ML: at 05:11

## 2020-01-01 RX ADMIN — Medication 20 ML: at 14:00

## 2020-01-01 RX ADMIN — Medication 10 ML: at 21:06

## 2020-01-01 RX ADMIN — NYSTATIN: 100000 POWDER TOPICAL at 08:40

## 2020-01-01 RX ADMIN — ENOXAPARIN SODIUM 40 MG: 40 INJECTION SUBCUTANEOUS at 08:40

## 2020-01-01 RX ADMIN — NYSTATIN: 100000 POWDER TOPICAL at 17:34

## 2020-01-01 NOTE — ROUTINE PROCESS
Bedside shift change report given to Job Samuel RN (oncoming nurse) by Elly Sánchez (offgoing nurse). Report included the following information SBAR, Kardex, Intake/Output, MAR, Accordion and Recent Results.

## 2020-01-01 NOTE — PROGRESS NOTES
visited patient, Rene Davis, for a follow up visit per staff request. Rene Davis was awake, alert, and lying in bed when the  came into the room. No family was present at this time.  introduced herself and extended support through active listening and pastoral presence. Rene Davis is very hard of hearing so  utilized a communication board when speaking with her. She also appeared to be somewhat confused during the conversation. Jose Raul Gibson briefly discussed her health concerns, discussed a recent move from [place], and also shared various other stories with the , but much of the conversation seemed very disconnected. Jose Raul Gibson did brighten when she spoke about all of the cards and gifts that were sitting on her window seal. She especially enjoyed speaking about the stuffed animals she had received. Jose Raul Gibson thanked the  for stopping by and expressed no other needs at this time. Advised of the 's availability.  will follow up as able and/or needed  [employer]. Lissett Villavicencio.      Paging Service: [phone])

## 2020-01-01 NOTE — PROGRESS NOTES
Bedside and Verbal shift change report given to Dominic Rahman RN (oncoming nurse) by Amaury Bautista RN (offgoing nurse). Report included the following information SBAR, Kardex, Intake/Output, MAR and Recent Results.

## 2020-01-01 NOTE — PROGRESS NOTES
Bedside and Verbal shift change report given to Sheldon Mchugh rn  (oncoming nurse) by Leo Fields  (offgoing nurse). Report included the following information SBAR and Kardex.

## 2020-01-01 NOTE — PROGRESS NOTES
Omar Naik [facility] 79  [address] [facility], [address] [place] [address]  [phone]      Medical Progress Note      NAME: Pooja Mccoy   :  1940  MRM:  [id number]    Date/Time: 2020  9:49 AM          Subjective:     Chief Complaint:  Weak: patient cannot tell me why she is here or what has happened recently due to dementia    ROS:  (bold if positive, if negative)    Unable to obtain          Objective:       Vitals:          Last 24hrs VS reviewed since prior progress note.  Most recent are:    Visit Vitals  /42 (BP 1 Location: Left arm, BP Patient Position: At rest)   Pulse 71   Temp 97.6 °F (36.4 °C)   Resp 17   Ht 5' (1.524 m)   Wt 49.9 kg (110 lb)   SpO2 99%   Breastfeeding No   BMI 21.48 kg/m²     SpO2 Readings from Last 6 Encounters:   20 99%   19 99%   19 100%   19 99%   10/31/19 99%   10/13/19 98%            Intake/Output Summary (Last 24 hours) at 2020 0949  Last data filed at 2020 0306  Gross per 24 hour   Intake 100 ml   Output    Net 100 ml          Exam:     Physical Exam:    Gen:  Well-developed, well-nourished, frail, elderly, in no acute distress  HEENT:  Pink conjunctivae, PERRL, hearing intact to voice, moist mucous membranes  Neck:  Supple, without masses, thyroid non-tender  Resp:  No accessory muscle use, clear breath sounds without wheezes rales or rhonchi  Card:  No murmurs, normal S1, S2 without thrills, bruits or peripheral edema, 2+ pedal pulses  Abd:  Soft, non-tender, non-distended, normoactive bowel sounds are present, no palpable organomegaly and no detectable hernias  Lymph:  No cervical or inguinal adenopathy  Musc:  No cyanosis or clubbing  Skin:  No rashes or ulcers, skin turgor is good, cap refill <2 sec  Neuro:  Cranial nerves are grossly intact, no focal motor weakness, follows some commands appropriately  Psych:  Poor insight, oriented to person, but not place and time, alert       Medications Reviewed: (see below)    Lab Data Reviewed: (see below)    ______________________________________________________________________    Medications:     Current Facility-Administered Medications   Medication Dose Route Frequency    nystatin (MYCOSTATIN) 100,000 unit/gram powder   Topical BID    zinc oxide-cod liver oil (DESITIN) 40 % paste   Topical DAILY    sodium chloride (NS) flush 5-40 mL  5-40 mL IntraVENous Q8H    sodium chloride (NS) flush 5-40 mL  5-40 mL IntraVENous PRN    enoxaparin (LOVENOX) injection 40 mg  40 mg SubCUTAneous Q24H            Lab Review:     No results for input(s): WBC, HGB, HCT, PLT, HGBEXT, HCTEXT, PLTEXT, HGBEXT, HCTEXT, PLTEXT in the last 72 hours. No results for input(s): NA, K, CL, CO2, GLU, BUN, CREA, CA, MG, PHOS, ALB, TBIL, TBILI, SGOT, ALT, INR, INREXT, INREXT in the last 72 hours. No results found for: GLUCPOC  No results for input(s): PH, PCO2, PO2, HCO3, FIO2 in the last 72 hours. No results for input(s): INR, INREXT, INREXT in the last 72 hours.   No results found for: SDES  Lab Results   Component Value Date/Time    Culture result: NO GROWTH 5 DAYS 12/15/2019 10:53 PM    Culture result: NO GROWTH 1 DAY 12/15/2019 08:03 PM            Assessment:     Principal Problem:    Weak (12/19/2019)    Active Problems:    Bilateral kidney stones (12/15/2019)      HTN (hypertension) (12/16/2019)      Dementia ([address] (12/19/2019)      Jacqualine Mink (12/19/2019)           Plan:     Principal Problem:    Weak (12/19/2019)/Debility (12/19/2019)   - due to recent hospitalization   - attempting PT/OT, limited by patient's dementia   - SNF placement    Active Problems:    Bilateral kidney stones (12/15/2019)   - leukocytosis has resolved without antibiotics but was up at last recheck, recheck in AM   - no acute intervention needed from , had stent last admission      HTN (hypertension) (12/16/2019)   - BP okay on meds as above      Dementia ([facility] [address] (12/19/2019)   - appears to be unable to care for herself      Total time spent in patient care: 15 minutes                  Care Plan discussed with: Patient, Care Manager and Nursing Staff    Discussed:  Code Status, Care Plan and D/C Planning    Prophylaxis:  Lovenox    Disposition:  SNF/LTC           ___________________________________________________    Attending Physician: Benny Medina MD

## 2020-01-01 NOTE — DISCHARGE SUMMARY
Physician Interim Discharge Summary     Patient ID:  Precious Orr  [id number]  78 y.o.  1940    Admit date: 12/18/2019      Discharge date and time: TBD    Admission Diagnoses: Weak [R53.1]  'light-for-dates' infant with signs of fetal malnutrition [P05.00]  Weakness [R53.1]    Discharge Diagnoses:  Principal Diagnosis Weak                                            Principal Problem:    Weak (12/19/2019)    Active Problems:    Bilateral kidney stones (12/15/2019)      HTN (hypertension) (12/16/2019)      Dementia ([address] (12/19/2019)      Juan Lnadon (12/19/2019)         Resolved Problems:  Problem List as of 1/1/2020 Date Reviewed: 12/19/2019          Codes Class Noted - Resolved    * (Principal) Weak ICD-10-CM: R53.1  ICD-9-CM: 780.79  12/19/2019 - Present        Hard of hearing (Chronic) ICD-10-CM: H91.90  ICD-9-CM: 389.9  12/19/2019 - Present        Dementia ([facility] (Chronic) ICD-10-CM: F03.90  ICD-9-CM: 294.20  12/19/2019 - Present        Debility (Chronic) ICD-10-CM: R53.81  ICD-9-CM: 799.3  12/19/2019 - Present        HTN (hypertension) (Chronic) ICD-10-CM: I10  ICD-9-CM: 401.9  12/16/2019 - Present        Hypokalemia due to loss of potassium ICD-10-CM: E87.6  ICD-9-CM: 276.8  12/16/2019 - Present        Hydronephrosis of right kidney ICD-10-CM: N13.30  ICD-9-CM: 591  12/15/2019 - Present        Bilateral kidney stones (Chronic) ICD-10-CM: N20.0  ICD-9-CM: 592.0  12/15/2019 - Present        RESOLVED: Pneumonia ICD-10-CM: J18.9  ICD-9-CM: 486  12/19/2019 - 12/25/2019        RESOLVED: Leukocytosis ICD-10-CM: E39.401  ICD-9-CM: 288.60  12/15/2019 - 12/25/2019        RESOLVED: A-fib ([address] ICD-10-CM: I48.91  ICD-9-CM: 427.31  12/15/2019 - 12/16/2019                Hospital Course: This is an interim summary and covers the hospitalization from 12/22/2019 to 1/1/2020. For any preceding portion of the patient's hospitalization, please see my partner's notes.      Ms. Deangelo Quiles remained in the hospital receiving halfway care throughout this time period. She had no acute medical issues. Placement is pending Level 2 screening which was delayed by the holidays. Final discharge summary will be done by the discharging physician.        Signed:  Love Glez MD  1/1/2020  11:08 AM

## 2020-01-02 PROBLEM — R41.0 CONFUSION: Status: ACTIVE | Noted: 2020-01-02

## 2020-01-02 PROBLEM — R53.1 WEAKNESS: Status: ACTIVE | Noted: 2020-01-02

## 2020-01-02 LAB
ANION GAP SERPL CALC-SCNC: 5 MMOL/L (ref 5–15)
BUN SERPL-MCNC: 16 MG/DL (ref 6–20)
BUN/CREAT SERPL: 37 (ref 12–20)
CALCIUM SERPL-MCNC: 7.7 MG/DL (ref 8.5–10.1)
CHLORIDE SERPL-SCNC: 109 MMOL/L (ref 97–108)
CO2 SERPL-SCNC: 25 MMOL/L (ref 21–32)
CREAT SERPL-MCNC: 0.43 MG/DL (ref 0.55–1.02)
ERYTHROCYTE [DISTWIDTH] IN BLOOD BY AUTOMATED COUNT: 13.2 % (ref 11.5–14.5)
FERRITIN SERPL-MCNC: 78 NG/ML (ref 8–252)
FOLATE SERPL-MCNC: 16.7 NG/ML (ref 5–21)
GLUCOSE SERPL-MCNC: 97 MG/DL (ref 65–100)
HCT VFR BLD AUTO: 28.5 % (ref 35–47)
HGB BLD-MCNC: 9.2 G/DL (ref 11.5–16)
IRON SATN MFR SERPL: 10 % (ref 20–50)
IRON SERPL-MCNC: 27 UG/DL (ref 35–150)
MAGNESIUM SERPL-MCNC: 2 MG/DL (ref 1.6–2.4)
MCH RBC QN AUTO: 30.9 PG (ref 26–34)
MCHC RBC AUTO-ENTMCNC: 32.3 G/DL (ref 30–36.5)
MCV RBC AUTO: 95.6 FL (ref 80–99)
NRBC # BLD: 0 K/UL (ref 0–0.01)
NRBC BLD-RTO: 0 PER 100 WBC
PHOSPHATE SERPL-MCNC: 3.2 MG/DL (ref 2.6–4.7)
PLATELET # BLD AUTO: 278 K/UL (ref 150–400)
PMV BLD AUTO: 10.7 FL (ref 8.9–12.9)
POTASSIUM SERPL-SCNC: 4 MMOL/L (ref 3.5–5.1)
RBC # BLD AUTO: 2.98 M/UL (ref 3.8–5.2)
SODIUM SERPL-SCNC: 139 MMOL/L (ref 136–145)
TIBC SERPL-MCNC: 274 UG/DL (ref 250–450)
VIT B12 SERPL-MCNC: 790 PG/ML (ref 193–986)
WBC # BLD AUTO: 9.2 K/UL (ref 3.6–11)

## 2020-01-02 PROCEDURE — 80048 BASIC METABOLIC PNL TOTAL CA: CPT

## 2020-01-02 PROCEDURE — 97530 THERAPEUTIC ACTIVITIES: CPT

## 2020-01-02 PROCEDURE — 83540 ASSAY OF IRON: CPT

## 2020-01-02 PROCEDURE — 97116 GAIT TRAINING THERAPY: CPT

## 2020-01-02 PROCEDURE — 74011250636 HC RX REV CODE- 250/636: Performed by: INTERNAL MEDICINE

## 2020-01-02 PROCEDURE — 82746 ASSAY OF FOLIC ACID SERUM: CPT

## 2020-01-02 PROCEDURE — 83735 ASSAY OF MAGNESIUM: CPT

## 2020-01-02 PROCEDURE — 74011250637 HC RX REV CODE- 250/637: Performed by: INTERNAL MEDICINE

## 2020-01-02 PROCEDURE — 65270000029 HC RM PRIVATE

## 2020-01-02 PROCEDURE — 99218 HC RM OBSERVATION: CPT

## 2020-01-02 PROCEDURE — 84100 ASSAY OF PHOSPHORUS: CPT

## 2020-01-02 PROCEDURE — 82728 ASSAY OF FERRITIN: CPT

## 2020-01-02 PROCEDURE — 85027 COMPLETE CBC AUTOMATED: CPT

## 2020-01-02 PROCEDURE — 36415 COLL VENOUS BLD VENIPUNCTURE: CPT

## 2020-01-02 PROCEDURE — 82607 VITAMIN B-12: CPT

## 2020-01-02 RX ORDER — LANOLIN ALCOHOL/MO/W.PET/CERES
1 CREAM (GRAM) TOPICAL
Status: DISCONTINUED | OUTPATIENT
Start: 2020-01-03 | End: 2020-01-06 | Stop reason: HOSPADM

## 2020-01-02 RX ORDER — AMOXICILLIN 250 MG
1 CAPSULE ORAL DAILY
Status: DISCONTINUED | OUTPATIENT
Start: 2020-01-03 | End: 2020-01-06 | Stop reason: HOSPADM

## 2020-01-02 RX ADMIN — Medication 20 ML: at 14:00

## 2020-01-02 RX ADMIN — Medication: at 08:39

## 2020-01-02 RX ADMIN — NYSTATIN: 100000 POWDER TOPICAL at 08:39

## 2020-01-02 RX ADMIN — Medication 10 ML: at 05:01

## 2020-01-02 RX ADMIN — NYSTATIN: 100000 POWDER TOPICAL at 17:04

## 2020-01-02 RX ADMIN — ENOXAPARIN SODIUM 40 MG: 40 INJECTION SUBCUTANEOUS at 08:39

## 2020-01-02 RX ADMIN — Medication 10 ML: at 21:42

## 2020-01-02 NOTE — PROGRESS NOTES
Bedside and Verbal shift change report given to Cuauhtemoc Crane RN (oncoming nurse) by Sabine Barton RN (offgoing nurse). Report included the following information SBAR, Kardex, Intake/Output, MAR and Recent Results.

## 2020-01-02 NOTE — PROGRESS NOTES
Omar Naik [facility] 79  [address]  [phone]      Medical Progress Note      NAME: Meme Santiago   :  1940  MRM:  [id number]    Date/Time: 2020         Subjective:     Chief Complaint:  Patient was seen and examined by me. Chart reviewed. Still confused. Requiring 2 assists with ambulation        Objective:       Vitals:       Last 24hrs VS reviewed since prior progress note. Most recent are:    Visit Vitals  /58 (BP 1 Location: Left arm, BP Patient Position: At rest)   Pulse 69   Temp 97.8 °F (36.6 °C)   Resp 16   Ht 5' (1.524 m)   Wt 49.9 kg (110 lb)   SpO2 96%   Breastfeeding No   BMI 21.48 kg/m²     SpO2 Readings from Last 6 Encounters:   20 96%   19 99%   19 100%   19 99%   10/31/19 99%   10/13/19 98%            Intake/Output Summary (Last 24 hours) at 2020 1305  Last data filed at 2020 1024  Gross per 24 hour   Intake 561 ml   Output    Net 561 ml        Exam:     Physical Exam:    Gen:  Elderly, frail, disheveled, NAD  HEENT:  Pink conjunctivae, PERRL, hard of hearing, moist mucous membranes  Neck:  Supple, without masses, thyroid non-tender  Resp:  No accessory muscle use, clear breath sounds without wheezes rales or rhonchi  Card:  No murmurs, normal S1, S2 without thrills, bruits or peripheral edema  Abd:  Soft, non-tender, non-distended, normoactive bowel sounds are present  Musc:  No cyanosis or clubbing  Skin:  No rashes  Neuro:   Moves all ext, followed some commands  Psych:  poor insight, oriented to person    Medications Reviewed: (see below)    Lab Data Reviewed: (see below)    ______________________________________________________________________    Medications:     Current Facility-Administered Medications   Medication Dose Route Frequency    [START ON 1/3/2020] senna-docusate (PERICOLACE) 8.6-50 mg per tablet 1 Tab  1 Tab Oral DAILY    [START ON 1/3/2020] ferrous sulfate tablet 325 mg 1 Tab Oral DAILY WITH BREAKFAST    nystatin (MYCOSTATIN) 100,000 unit/gram powder   Topical BID    zinc oxide-cod liver oil (DESITIN) 40 % paste   Topical DAILY    sodium chloride (NS) flush 5-40 mL  5-40 mL IntraVENous Q8H    sodium chloride (NS) flush 5-40 mL  5-40 mL IntraVENous PRN    enoxaparin (LOVENOX) injection 40 mg  40 mg SubCUTAneous Q24H          Lab Review:     Recent Labs     01/02/20  0230   WBC 9.2   HGB 9.2*   HCT 28.5*        Recent Labs     01/02/20  0230      K 4.0   *   CO2 25   GLU 97   BUN 16   CREA 0.43*   CA 7.7*   MG 2.0   PHOS 3.2     No results found for: GLUCPOC       Assessment / Plan:     66 yo hx of HTN, dementia, kidney stones, hydronephrosis s/p R stent, presented w/ falls, weakness    1) Frequent falls/weakness/debility: due to dementia, multiple hospitalizations. Cont PT/OT. Awaiting SNF placement. Level 2 pending    2) HTN: BP stable. Not on meds    3) Bilateral kidney stones/hydronephrosis: s/p recent R ureteral stent. F/u with Urology    4) Anemia: likely has iron def. Will check B12/folate/iron panel. Monitor Hgb prn    5) Dementia: confused.   Cont to monitor for agitation     Total time spent with patient: 20 min                  Care Plan discussed with: Patient, nursing    Discussed:  Care Plan    Prophylaxis:  Lovenox    Disposition:  SNF/LTC (level 2 pending)           ___________________________________________________    Attending Physician: Capo Walsh MD

## 2020-01-02 NOTE — PROGRESS NOTES
1-2-2020 CASE MANAGEMENT NOTE:  The patient was seen and evaluated today by Mamadou Baxter, an  hired by [facility], to complete the Level II screening. She informed me she plans to also speak with by telephone the patients's POA and her Medicaid personal care aide. She stated she will try to complete the screening today and fax her recommendation to [facility] for them to make the final determination. I spoke with the admissions director at [facility] to inform her of this and they do still have a bed. CM will follow.  CARLO Morel, CM

## 2020-01-02 NOTE — PROGRESS NOTES
Problem: Mobility Impaired (Adult and Pediatric)  Goal: *Acute Goals and Plan of Care (Insert Text)  Description  FUNCTIONAL STATUS PRIOR TO ADMISSION: At baseline patient uses rollator. HOME SUPPORT PRIOR TO ADMISSION: The patient lived alone with hired caregiver during daylight hours to provide assistance. Physical Therapy Goals  Revised 1/2/2020  1. Patient will move from supine to sit and sit to supine , scoot up and down and roll side to side in bed with minimal assistance/contact                                                                                                                                                                                                               guard assist within 7 day(s). 2.  Patient will transfer from bed to chair and chair to bed with minimal assistance/contact guard assist using the least restrictive device within 7 day(s). 3.  Patient will perform sit to stand with minimal assistance/contact guard assist within 7 day(s). 4.  Patient will ambulate with minimal assistance/contact guard assist for 10 feet with the least restrictive device within 7 day(s). Initiated 12/19/2019 Weekly Reassessment 12/26/19 (Goals ongoing); not met                                                                              1.  Patient will move from supine to sit and sit to supine  in bed with minimal assistance/contact guard assist within 7 day(s). 2.  Patient will transfer from bed to chair and chair to bed with minimal assistance using the least restrictive device within 7 day(s). 3.  Patient will perform sit to stand with minimal assistance/contact guard assist within 7 day(s). 4.  Patient will ambulate with minimal assistance/contact guard assist for 50 feet with the least restrictive device within 7 day(s). 5.  Patient will ascend/descend 4 stairs with 2 handrail(s) with minimal assistance/contact guard assist within 7 day(s).   6.  Patient to maintain sitting balance on edge of bed for at least 5 minutes with min/CGA assist within 7 days. (added 12/26)       Outcome: Progressing Towards Goal   PHYSICAL THERAPY TREATMENT: WEEKLY REASSESSMENT  Patient: Lupillo Carroll (20 y.o. female)  Date: 1/2/2020  Primary Diagnosis: Weak [R53.1]  'light-for-dates' infant with signs of fetal malnutrition [P05.00]  Weakness [R53.1]  Weakness [R53.1]  Confusion [R41.0]        Precautions:   Fall, WBAT      ASSESSMENT  Patient continues with skilled PT services and is progressing towards goals. Rolled on the edge of bed min assist,sit to stand min assist x 2, ambulate with mod assist x 2 using a rolling walker towards the recliner. Patient very hard of hearing. OOB to chair as tolerated performed some active range of motion exercise on both LE all planes while sitting on the chair.  Communicated with nurse who agreed to monitor and assist back to bed when ready to go back to bed when ready to go back                                                                                                                                                                                                                                                                                                                                                                                                                                                                                                                                                                                                                                                                                                                                                                                                                                            Patient's progression toward goals since last assessment: fair    Current Level of Function Impacting Discharge (mobility/balance): min assist x 2 with transfers nan mod assist with ambulation towards the recliner    Other factors to consider for discharge:          PLAN :  Goals have been updated based on progression since last assessment. Patient continues to benefit from skilled intervention to address the above impairments. Recommendations and Planned Interventions: bed mobility training, transfer training, gait training, therapeutic exercises, and therapeutic activities      Frequency/Duration: Patient will be followed by physical therapy:  5 times a week to address goals. Recommendation for discharge: (in order for the patient to meet his/her long term goals)  Therapy up to 5 days/week in SNF setting    This discharge recommendation:  Has been made in collaboration with the attending provider and/or case management    IF patient discharges home will need the following DME: patient owns DME required for discharge         SUBJECTIVE:   Patient stated [address].     OBJECTIVE DATA SUMMARY:   HISTORY:    Past Medical History:   Diagnosis Date    Arthritis     Chronic kidney disease     kidney stones    Chronic pain     right hip    Hypertension     Ill-defined condition     chronic diarrhea     Past Surgical History:   Procedure Laterality Date    HX APPENDECTOMY      HX ORTHOPAEDIC      bilateral carpal tunnel    HX TONSILLECTOMY         Personal factors and/or comorbidities impacting plan of care:     Home Situation  Home Environment: Apartment  One/Two Story Residence: One story  Living Alone: Yes  Support Systems: (caregiver during daytime hours alone at night)  Patient Expects to be Discharged to[de-identified] Private residence  Current DME Used/Available at Home: Belita Gallop, rollator  Tub or Shower Type: Shower    EXAMINATION/PRESENTATION/DECISION MAKING:   Critical Behavior:  Neurologic State: Alert  Orientation Level: Oriented to person, Oriented to place  Cognition: Decreased attention/concentration, Impaired decision making  Safety/Judgement: Decreased awareness of environment, Decreased awareness of need for assistance, Decreased awareness of need for safety  Hearing: Auditory  Auditory Impairment: Hard of hearing, bilateral    Range Of Motion:  AROM: Within functional limits           PROM: Within functional limits           Strength:    Strength: Generally decreased, functional                    Tone & Sensation:                                  Coordination:  Coordination: Generally decreased, functional  Vision:      Functional Mobility:  Bed Mobility:  Rolling: Minimum assistance  Supine to Sit: Minimum assistance  Sit to Supine: Minimum assistance  Scooting: Moderate assistance  Transfers:  Sit to Stand: Assist x2;Minimum assistance  Stand to Sit: Assist x2;Minimum assistance; Moderate assistance  Stand Pivot Transfers: Minimum assistance; Additional time;Assist x2     Bed to Chair: Moderate assistance;Assist x2              Balance:   Sitting: Impaired  Sitting - Static: Fair (occasional)  Sitting - Dynamic: Fair (occasional)  Standing: Impaired; With support  Standing - Static: Constant support  Standing - Dynamic : Constant support  Ambulation/Gait Training:  Distance (ft): 10 Feet (ft)  Assistive Device: Walker, rolling;Gait belt  Ambulation - Level of Assistance: Moderate assistance; Additional time;Assist x2     Gait Description (WDL): Exceptions to WDL  Gait Abnormalities: Path deviations; Step to gait        Base of Support: Widened     Speed/Heena: Fluctuations; Slow  Step Length: Right shortened;Left shortened                         Therapeutic Exercises:    Instructed patient to continue active range of motion exercise on both legs while up on chair or on bed. Pain Ratin/10    Activity Tolerance:   Good  Please refer to the flowsheet for vital signs taken during this treatment.     After treatment patient left in no apparent distress:   Sitting in chair, Heels elevated for pressure relief, and Bed / chair alarm activated    COMMUNICATION/EDUCATION:   The patients plan of care was discussed with: Occupational Therapist and Registered Nurse. Fall prevention education was provided and the patient/caregiver indicated understanding. Thank you for this referral.  Israel Beach PT,WCC.    Time Calculation: 26 mins

## 2020-01-02 NOTE — PROGRESS NOTES
Problem: Self Care Deficits Care Plan (Adult)  Goal: *Acute Goals and Plan of Care (Insert Text)  Description    FUNCTIONAL STATUS PRIOR TO ADMISSION: Patient was supervision for basic and instrumental ADLs. HOME SUPPORT: The patient lived alone with caregiver intermittently to provide assistance. Occupational Therapy Goals  1/2/2020- continue goals    Weekly re assessment goals 12/26/2019  1. Patient will perform lower body dressing with moderate within 7 day(s). 2.  Patient will perform grooming, seated EOB with minimum assistance  within 7 day(s). 3.  Patient will perform toilet transfers with moderate assistance within 7 day(s). 4.  Patient will perform all aspects of toileting with moderate assistance within 7 day(s). 5.  Patient will participate in upper extremity therapeutic exercise/activities with supervision/set-up for 5 minutes within 7 day(s). Initiated 12/19/2019  1. Patient will perform lower body dressing with supervision/set-up within 7 day(s). - downgraded  2. Patient will perform grooming, standing at sink, with supervision/set-up within 7 day(s). - downgraded   3. Patient will perform toilet transfers with supervision/set-up within 7 day(s). - downgraded   4. Patient will perform all aspects of toileting with supervision/set-up within 7 day(s). -downgraded   5. Patient will participate in upper extremity therapeutic exercise/activities with supervision/set-up for 5 minutes within 7 day(s). - progressing           Outcome: Progressing Towards Goal   OCCUPATIONAL THERAPY TREATMENT/WEEKLY RE-EVALUATION  Patient: Devan Elliott (59 y.o. female)  Date: 1/2/2020  Diagnosis: Weak [R53.1]  'light-for-dates' infant with signs of fetal malnutrition [P05.00]  Weakness [R53.1]  Weakness [R53.1]  Confusion [R41.0]   Weak       Precautions: Fall, WBAT  Chart, occupational therapy assessment, plan of care, and goals were reviewed.     ASSESSMENT  Based on the objective data described below, patient progressing toward goals, but has not met due to increased fear. Patient today with improved activity tolerance. She is agreeable to activity and requires mod/max assist to don socks/shoes in bed. Patient uses cross leg technique but not successful in managing tasks on her own. Today, she is min assist x 2 to move to EOB. Patient becomes fearful when EOB, and requires mod assist x 2 for transfer to chair. Patient left in chair in NAD, LEs elevate, awaiting lunch. Current Level of Function Impacting Discharge (ADLs): max assist for some ADLs, mod assist x 2 for transfers due to fear    Other factors to consider for discharge: patient lives alone - unable to continue          PLAN :  Goals have been updated based on progression since last assessment. Patient continues to benefit from skilled intervention to address the above impairments. Continue to follow patient 3 times a week to address goals. Recommend with staff: OOB to chair, BSC     Recommend next OT session: EOB ADLs     Recommendation for discharge: (in order for the patient to meet his/her long term goals)  Therapy up to 5 days/week in SNF setting    This discharge recommendation:  Has been made in collaboration with the attending provider and/or case management    Equipment recommendations for successful discharge (if) home: TBD       SUBJECTIVE:   Patient stated These shoes are new.     OBJECTIVE DATA SUMMARY:   Cognitive/Behavioral Status:  Neurologic State: Alert  Orientation Level: Oriented to person;Oriented to place  Cognition: Decreased attention/concentration; Impaired decision making             Functional Mobility and Transfers for ADLs:  Bed Mobility:  Supine to Sit: Minimum assistance  Scooting: Moderate assistance    Transfers:  Sit to Stand: Assist x2;Minimum assistance     Bed to Chair: Moderate assistance;Assist x2    Balance:  Sitting: Impaired  Standing: Impaired; With support  Standing - Static: Constant support  Standing - Dynamic : Constant support    ADL Intervention:                           Lower Body Dressing Assistance  Socks: Moderate assistance  Shoes with Cloth Laces: Maximum assistance  Leg Crossed Method Used: Yes  Position Performed: Supine  Cues: Verbal cues provided;Physical assistance              Pain:      Activity Tolerance:   Good  Please refer to the flowsheet for vital signs taken during this treatment.     After treatment patient left in no apparent distress:   Sitting in chair, Call bell within reach, and Bed / chair alarm activated    COMMUNICATION/COLLABORATION:   The patients plan of care was discussed with: Physical Therapist and Registered Nurse    Shirlean Babinski, OTR/L  Time Calculation: 26 mins

## 2020-01-03 PROCEDURE — 97535 SELF CARE MNGMENT TRAINING: CPT

## 2020-01-03 PROCEDURE — 97530 THERAPEUTIC ACTIVITIES: CPT

## 2020-01-03 PROCEDURE — 74011250637 HC RX REV CODE- 250/637: Performed by: INTERNAL MEDICINE

## 2020-01-03 PROCEDURE — 97116 GAIT TRAINING THERAPY: CPT

## 2020-01-03 PROCEDURE — 65270000029 HC RM PRIVATE

## 2020-01-03 PROCEDURE — 74011250636 HC RX REV CODE- 250/636: Performed by: INTERNAL MEDICINE

## 2020-01-03 RX ADMIN — Medication: at 09:15

## 2020-01-03 RX ADMIN — ENOXAPARIN SODIUM 40 MG: 40 INJECTION SUBCUTANEOUS at 09:15

## 2020-01-03 RX ADMIN — NYSTATIN: 100000 POWDER TOPICAL at 17:41

## 2020-01-03 RX ADMIN — Medication 10 ML: at 05:01

## 2020-01-03 RX ADMIN — NYSTATIN: 100000 POWDER TOPICAL at 09:15

## 2020-01-03 RX ADMIN — Medication 10 ML: at 09:16

## 2020-01-03 RX ADMIN — Medication 10 ML: at 22:00

## 2020-01-03 RX ADMIN — FERROUS SULFATE TAB 325 MG (65 MG ELEMENTAL FE) 325 MG: 325 (65 FE) TAB at 09:15

## 2020-01-03 RX ADMIN — SENNOSIDES AND DOCUSATE SODIUM 1 TABLET: 8.6; 5 TABLET ORAL at 09:15

## 2020-01-03 NOTE — PROGRESS NOTES
Bedside and Verbal shift change report given to KAHLIL Rodriguez (oncoming nurse) by [facility], RN (offgoing nurse). Report included the following information SBAR, Kardex, Intake/Output, MAR and Recent Results.

## 2020-01-03 NOTE — PROGRESS NOTES
Omar Naik [facility]  [address] [facility], [address] [place] [address]  [phone]      Medical Progress Note      NAME: Vanesa Almaraz   :  1940  MRM:  [id number]    Date/Time: 1/3/2020         Subjective:     Chief Complaint:  Patient was seen and examined by me. Chart reviewed. No events overnight. Working with PT/OT       Objective:       Vitals:       Last 24hrs VS reviewed since prior progress note. Most recent are:    Visit Vitals  /54 (BP 1 Location: Right arm, BP Patient Position: At rest)   Pulse 70   Temp 97.7 °F (36.5 °C)   Resp 20   Ht 5' (1.524 m)   Wt 49.9 kg (110 lb)   SpO2 100%   Breastfeeding No   BMI 21.48 kg/m²     SpO2 Readings from Last 6 Encounters:   20 100%   19 99%   19 100%   19 99%   10/31/19 99%   10/13/19 98%            Intake/Output Summary (Last 24 hours) at 1/3/2020 1346  Last data filed at 2020 1916  Gross per 24 hour   Intake 720 ml   Output    Net 720 ml        Exam:     Physical Exam:    Gen:  Elderly, frail, disheveled, NAD  HEENT:  Pink conjunctivae, PERRL, hard of hearing, moist mucous membranes  Neck:  Supple, without masses, thyroid non-tender  Resp:  No accessory muscle use, clear breath sounds without wheezes rales or rhonchi  Card:  No murmurs, normal S1, S2 without thrills, bruits or peripheral edema  Abd:  Soft, non-tender, non-distended, normoactive bowel sounds are present  Musc:  No cyanosis or clubbing  Skin:  No rashes  Neuro: Moves all ext, followed some commands  Psych:  poor insight, oriented to person.   Confused     Medications Reviewed: (see below)    Lab Data Reviewed: (see below)    ______________________________________________________________________    Medications:     Current Facility-Administered Medications   Medication Dose Route Frequency    senna-docusate (PERICOLACE) 8.6-50 mg per tablet 1 Tab  1 Tab Oral DAILY    ferrous sulfate tablet 325 mg  1 Tab Oral DAILY WITH BREAKFAST    nystatin (MYCOSTATIN) 100,000 unit/gram powder   Topical BID    zinc oxide-cod liver oil (DESITIN) 40 % paste   Topical DAILY    sodium chloride (NS) flush 5-40 mL  5-40 mL IntraVENous Q8H    sodium chloride (NS) flush 5-40 mL  5-40 mL IntraVENous PRN    enoxaparin (LOVENOX) injection 40 mg  40 mg SubCUTAneous Q24H          Lab Review:     Recent Labs     01/02/20  0230   WBC 9.2   HGB 9.2*   HCT 28.5*        Recent Labs     01/02/20  0230      K 4.0   *   CO2 25   GLU 97   BUN 16   CREA 0.43*   CA 7.7*   MG 2.0   PHOS 3.2     No results found for: GLUCPOC       Assessment / Plan:     66 yo hx of HTN, dementia, kidney stones, hydronephrosis s/p R stent, presented w/ falls, weakness    1) Frequent falls/weakness/debility: due to dementia, multiple hospitalizations. Cont PT/OT. Awaiting SNF placement. Level 2 pending    2) HTN: BP stable. Not on meds    3) Bilateral kidney stones/hydronephrosis: s/p recent R ureteral stent. F/u with Urology. Check BMP prn    4) Anemia: likely has iron def. Cont oral iron. Monitor Hgb prn    5) Dementia: confused.   Cont to monitor for agitation     Total time spent with patient: 10 min                  Care Plan discussed with: Patient, nursing    Discussed:  Care Plan    Prophylaxis:  Lovenox    Disposition:  SNF/LTC (level 2 pending)           ___________________________________________________    Attending Physician: Marcial Flores MD

## 2020-01-03 NOTE — PROGRESS NOTES
Problem: Mobility Impaired (Adult and Pediatric)  Goal: *Acute Goals and Plan of Care (Insert Text)  Description  FUNCTIONAL STATUS PRIOR TO ADMISSION: At baseline patient uses rollator. HOME SUPPORT PRIOR TO ADMISSION: The patient lived alone with hired caregiver during daylight hours to provide assistance. Physical Therapy Goals  Revised 1/2/2020  1. Patient will move from supine to sit and sit to supine , scoot up and down and roll side to side in bed with minimal assistance/contact guard assist within 7 day(s). 2.  Patient will transfer from bed to chair and chair to bed with minimal assistance/contact guard assist using the least restrictive device within 7 day(s). 3.  Patient will perform sit to stand with minimal assistance/contact guard assist within 7 day(s). 4.  Patient will ambulate with minimal assistance/contact guard assist for 10 feet with the least restrictive device within 7 day(s). Initiated 12/19/2019 Weekly Reassessment 12/26/19 (Goals ongoing); not met  1. Patient will move from supine to sit and sit to supine  in bed with minimal assistance/contact guard assist within 7 day(s). 2.  Patient will transfer from bed to chair and chair to bed with minimal assistance using the least restrictive device within 7 day(s). 3.  Patient will perform sit to stand with minimal assistance/contact guard assist within 7 day(s). 4.  Patient will ambulate with minimal assistance/contact guard assist for 50 feet with the least restrictive device within 7 day(s). 5.  Patient will ascend/descend 4 stairs with 2 handrail(s) with minimal assistance/contact guard assist within 7 day(s).   6.  Patient to maintain sitting balance on edge of bed for at least 5 minutes with min/CGA assist within 7 days. (added 12/26)       Note:   PHYSICAL THERAPY TREATMENT  Patient: Roxie Nolasco (21 y.o. female)  Date: 1/3/2020  Diagnosis: Weak [R53.1]  'light-for-dates' infant with signs of fetal malnutrition [P05.00]  Weakness [R53.1]  Weakness [R53.1]  Confusion [R41.0]   Weak       Precautions: Fall, WBAT  Chart, physical therapy assessment, plan of care and goals were reviewed. ASSESSMENT  Patient continues with skilled PT services. Pt comes to sit with CGA. Pt min assist of 2 sit to stand. Pt ambulated 8ft with hand held min assist of 2. Pt is a fall risk but is safer ambulating with hand held assist.Pt left sitting continue goals. Current Level of Function Impacting Discharge (mobility/balance): Pt is min to mod assist of 2 for ambulation. PLAN :  Patient continues to benefit from skilled intervention to address the above impairments. Continue treatment per established plan of care. to address goals. Recommendation for discharge: (in order for the patient to meet his/her long term goals)  Therapy up to 5 days/week in SNF setting    This discharge recommendation:  Has been made in collaboration with the attending provider and/or case management    IF patient discharges home will need the following DME:        SUBJECTIVE:       OBJECTIVE DATA SUMMARY:   Critical Behavior:  Neurologic State: Alert  Orientation Level: Oriented to person  Cognition: Decreased command following  Safety/Judgement: Decreased awareness of environment, Decreased awareness of need for assistance, Decreased awareness of need for safety  Functional Mobility Training:  Bed Mobility:      Pt CGA supine to sit              Transfers:  Sit to Stand: Minimum assistance;Assist x2  Stand to Sit: Minimum assistance;Assist x2                             Balance:     Ambulation/Gait Training:  Distance (ft): 8 Feet (ft)  Assistive Device: Gait belt(hand held assist of 2)  Ambulation - Level of Assistance: Minimal assistance;Assist x2; Moderate assistance        Gait Abnormalities: Decreased step clearance;Trunk sway increased                           Activity Tolerance:   Good and Fair  Please refer to the flowsheet for vital signs taken during this treatment.     After treatment patient left in no apparent distress:   Sitting in chair    COMMUNICATION/COLLABORATION:   The patients plan of care was discussed with: Physical Therapist    Kaylen Ferreira PTA   Time Calculation: 23 mins

## 2020-01-03 NOTE — PROGRESS NOTES
Problem: Self Care Deficits Care Plan (Adult)  Goal: *Acute Goals and Plan of Care (Insert Text)  Description    FUNCTIONAL STATUS PRIOR TO ADMISSION: Patient was supervision for basic and instrumental ADLs. HOME SUPPORT: The patient lived alone with caregiver intermittently to provide assistance. Occupational Therapy Goals  1/2/2020- continue goals    Weekly re assessment goals 12/26/2019  1. Patient will perform lower body dressing with moderate within 7 day(s). 2.  Patient will perform grooming, seated EOB with minimum assistance  within 7 day(s). 3.  Patient will perform toilet transfers with moderate assistance within 7 day(s). 4.  Patient will perform all aspects of toileting with moderate assistance within 7 day(s). 5.  Patient will participate in upper extremity therapeutic exercise/activities with supervision/set-up for 5 minutes within 7 day(s). Initiated 12/19/2019  1. Patient will perform lower body dressing with supervision/set-up within 7 day(s). - downgraded  2. Patient will perform grooming, standing at sink, with supervision/set-up within 7 day(s). - downgraded   3. Patient will perform toilet transfers with supervision/set-up within 7 day(s). - downgraded   4. Patient will perform all aspects of toileting with supervision/set-up within 7 day(s). -downgraded   5. Patient will participate in upper extremity therapeutic exercise/activities with supervision/set-up for 5 minutes within 7 day(s). - progressing           Outcome: Progressing Towards Goal   OCCUPATIONAL THERAPY TREATMENT  Patient: Dwayne Doyle (52 y.o. female)  Date: 1/3/2020  Diagnosis: Weak [R53.1]  'light-for-dates' infant with signs of fetal malnutrition [P05.00]  Weakness [R53.1]  Weakness [R53.1]  Confusion [R41.0]   Weak       Precautions: Fall, WBAT  Chart, occupational therapy assessment, plan of care, and goals were reviewed.     ASSESSMENT  Patient continues with skilled OT services and is progressing slowly towards goals. She attempted to don shoes but needed assist to don heels and to tie shoe laces. Pt brushed her teeth seated edge of bed, able to do majority of task herself. Assist x 2 to stand and transfer to chair, high fear of falling. Pt needs max assist for hygiene following toileting. Current Level of Function Impacting Discharge (ADLs): max assist LB ADl's    Other factors to consider for discharge:          PLAN :  Patient continues to benefit from skilled intervention to address the above impairments. Continue treatment per established plan of care. to address goals. Recommend with staff: Out of bed to chair for meals and functional activity as tolerated    Recommend next OT session: cont towards goals    Recommendation for discharge: (in order for the patient to meet his/her long term goals)  Therapy up to 5 days/week in SNF setting    This discharge recommendation:  Has not yet been discussed the attending provider and/or case management    IF patient discharges home will need the following DME: none       SUBJECTIVE:   Patient stated I do not know if they cleaned me up all the way.     OBJECTIVE DATA SUMMARY:   Cognitive/Behavioral Status:  Neurologic State: Alert  Orientation Level: Oriented to person  Cognition: Decreased command following             Functional Mobility and Transfers for ADLs:  Bed Mobility:   Min assist supine to sit    Transfers: Moderate assist x 2          Balance: Intact sitting balance       ADL Intervention:       Grooming  Grooming Assistance: Minimum assistance  Position Performed: Seated edge of bed  Brushing Teeth: Minimum assistance                   Lower Body Dressing Assistance  Shoes with Cloth Laces: Maximum assistance    Toileting  Bowel Hygiene: Maximum assistance         Activity Tolerance:   Fair  Please refer to the flowsheet for vital signs taken during this treatment.     After treatment patient left in no apparent distress:   Sitting in chair and Call bell within reach    COMMUNICATION/COLLABORATION:   The patients plan of care was discussed with: Physical Therapy Assistant and Occupational Therapist    JOHN Diaz  Time Calculation: 20 mins

## 2020-01-03 NOTE — PROGRESS NOTES
1-3-2020 CASE MANAGEMENT NOTE:  I left a message for [facility] inquiring about the status of the Level II screening and informed them that [facility] is still willing to accept the patient.  CARLO Morel, CM

## 2020-01-03 NOTE — PROGRESS NOTES
Problem: Pressure Injury - Risk of  Goal: *Prevention of pressure injury  Outcome: Progressing Towards Goal  Note: Pressure Injury Interventions:  Sensory Interventions: Check visual cues for pain, Discuss PT/OT consult with provider, Keep linens dry and wrinkle-free    Moisture Interventions: Check for incontinence Q2 hours and as needed, Internal/External urinary devices, Limit adult briefs    Activity Interventions: Assess need for specialty bed, Increase time out of bed, PT/OT evaluation    Mobility Interventions: HOB 30 degrees or less, PT/OT evaluation, Turn and reposition approx.  every two hours(pillow and wedges)    Nutrition Interventions: Document food/fluid/supplement intake, Offer support with meals,snacks and hydration    Friction and Shear Interventions: Lift sheet, Lift team/patient mobility team, Minimize layers                Problem: Falls - Risk of  Goal: *Absence of Falls  Outcome: Progressing Towards Goal  Note: Fall Risk Interventions:  Mobility Interventions: Bed/chair exit alarm, OT consult for ADLs, PT Consult for mobility concerns, PT Consult for assist device competence    Mentation Interventions: Bed/chair exit alarm, More frequent rounding, Room close to nurse's station    Medication Interventions: Bed/chair exit alarm    Elimination Interventions: Bed/chair exit alarm, Call light in reach, Stay With Me (per policy)    History of Falls Interventions: Bed/chair exit alarm, Investigate reason for fall         Problem: Patient Education: Go to Patient Education Activity  Goal: Patient/Family Education  Outcome: Progressing Towards Goal

## 2020-01-03 NOTE — PROGRESS NOTES
Bedside and Verbal shift change report given to Gilberto Barnard rn  (oncoming nurse) by Dwayne Campos  (offgoing nurse). Report included the following information SBAR and Kardex.

## 2020-01-04 PROCEDURE — 74011250637 HC RX REV CODE- 250/637: Performed by: INTERNAL MEDICINE

## 2020-01-04 PROCEDURE — 74011250636 HC RX REV CODE- 250/636: Performed by: INTERNAL MEDICINE

## 2020-01-04 PROCEDURE — 65270000029 HC RM PRIVATE

## 2020-01-04 RX ADMIN — Medication 10 ML: at 08:58

## 2020-01-04 RX ADMIN — SENNOSIDES AND DOCUSATE SODIUM 1 TABLET: 8.6; 5 TABLET ORAL at 08:56

## 2020-01-04 RX ADMIN — ENOXAPARIN SODIUM 40 MG: 40 INJECTION SUBCUTANEOUS at 08:57

## 2020-01-04 RX ADMIN — Medication 10 ML: at 21:09

## 2020-01-04 RX ADMIN — NYSTATIN: 100000 POWDER TOPICAL at 08:57

## 2020-01-04 RX ADMIN — Medication: at 08:57

## 2020-01-04 RX ADMIN — FERROUS SULFATE TAB 325 MG (65 MG ELEMENTAL FE) 325 MG: 325 (65 FE) TAB at 08:56

## 2020-01-04 RX ADMIN — Medication 10 ML: at 06:00

## 2020-01-04 NOTE — PROGRESS NOTES
Problem: Pressure Injury - Risk of  Goal: *Prevention of pressure injury  Description  Document David Scale and appropriate interventions in the flowsheet. Outcome: Progressing Towards Goal  Note: Pressure Injury Interventions:  Sensory Interventions: Assess changes in LOC, Assess need for specialty bed    Moisture Interventions: Absorbent underpads    Activity Interventions: Assess need for specialty bed    Mobility Interventions: HOB 30 degrees or less    Nutrition Interventions: Document food/fluid/supplement intake    Friction and Shear Interventions: Lift sheet, Lift team/patient mobility team, Minimize layers                Problem: Falls - Risk of  Goal: *Absence of Falls  Description  Document Radha Fall Risk and appropriate interventions in the flowsheet.   Outcome: Progressing Towards Goal  Note: Fall Risk Interventions:  Mobility Interventions: Bed/chair exit alarm    Mentation Interventions: Bed/chair exit alarm    Medication Interventions: Bed/chair exit alarm    Elimination Interventions: Bed/chair exit alarm, Call light in reach    History of Falls Interventions: Bed/chair exit alarm

## 2020-01-04 NOTE — ROUTINE PROCESS
Bedside shift change report given to KAHLIL Rodriguez (oncoming nurse) by Perlita Bonds RN (offgoing nurse). Report included the following information SBAR, Kardex and MAR.

## 2020-01-04 NOTE — PROGRESS NOTES
Omar Naik [facility] 79  [address] [facility], [place], [address] [place] [address]  [phone]      Medical Progress Note      NAME: Wilda Diaz   :  1940  MRM:  [id number]    Date/Time: 2020         Subjective:     Chief Complaint:  Patient was seen and examined by me. Chart reviewed. Doing well, confused       Objective:       Vitals:       Last 24hrs VS reviewed since prior progress note. Most recent are:    Visit Vitals  /49 (BP 1 Location: Left arm, BP Patient Position: At rest)   Pulse 61   Temp 97.7 °F (36.5 °C)   Resp 18   Ht 5' (1.524 m)   Wt 49.9 kg (110 lb)   SpO2 97%   Breastfeeding No   BMI 21.48 kg/m²     SpO2 Readings from Last 6 Encounters:   20 97%   19 99%   19 100%   19 99%   10/31/19 99%   10/13/19 98%          No intake or output data in the 24 hours ending 20 1208     Exam:     Physical Exam:    Gen:  Elderly, frail, disheveled, NAD  HEENT:  Pink conjunctivae, PERRL, hard of hearing, moist mucous membranes  Neck:  Supple, without masses, thyroid non-tender  Resp:  No accessory muscle use, clear breath sounds without wheezes rales or rhonchi  Card:  No murmurs, normal S1, S2 without thrills, bruits or peripheral edema  Abd:  Soft, non-tender, non-distended, normoactive bowel sounds are present  Musc:  No cyanosis or clubbing  Skin:  No rashes  Neuro: Moves all ext, followed some commands  Psych:  poor insight, oriented to person.   Confused     Medications Reviewed: (see below)    Lab Data Reviewed: (see below)    ______________________________________________________________________    Medications:     Current Facility-Administered Medications   Medication Dose Route Frequency    senna-docusate (PERICOLACE) 8.6-50 mg per tablet 1 Tab  1 Tab Oral DAILY    ferrous sulfate tablet 325 mg  1 Tab Oral DAILY WITH BREAKFAST    nystatin (MYCOSTATIN) 100,000 unit/gram powder   Topical BID    zinc oxide-cod liver oil (DESITIN) 40 % paste Topical DAILY    sodium chloride (NS) flush 5-40 mL  5-40 mL IntraVENous Q8H    sodium chloride (NS) flush 5-40 mL  5-40 mL IntraVENous PRN    enoxaparin (LOVENOX) injection 40 mg  40 mg SubCUTAneous Q24H          Lab Review:     Recent Labs     01/02/20  0230   WBC 9.2   HGB 9.2*   HCT 28.5*        Recent Labs     01/02/20  0230      K 4.0   *   CO2 25   GLU 97   BUN 16   CREA 0.43*   CA 7.7*   MG 2.0   PHOS 3.2     No results found for: GLUCPOC       Assessment / Plan:     68 yo hx of HTN, dementia, kidney stones, hydronephrosis s/p R stent, presented w/ falls, weakness    1) Frequent falls/weakness/debility: now stable. Still requiring significant help with ambulation. Weakness due to dementia, multiple hospitalizations. Cont PT/OT. Awaiting SNF placement. Level 2 pending    2) HTN: BP stable. Not on meds    3) Bilateral kidney stones/hydronephrosis: s/p recent R ureteral stent. F/u with Urology. Check BMP prn    4) Anemia: likely has iron def. Cont oral iron. Monitor Hgb prn    5) Dementia: confused.   Cont to monitor for agitation     Total time spent with patient: 10 min                  Care Plan discussed with: Patient, nursing, CM    Discussed:  Care Plan    Prophylaxis:  Lovenox    Disposition:  SNF/LTC (level 2 pending)           ___________________________________________________    Attending Physician: Home Wang MD

## 2020-01-05 PROCEDURE — 65270000029 HC RM PRIVATE

## 2020-01-05 PROCEDURE — 74011250637 HC RX REV CODE- 250/637: Performed by: INTERNAL MEDICINE

## 2020-01-05 PROCEDURE — 74011250636 HC RX REV CODE- 250/636: Performed by: INTERNAL MEDICINE

## 2020-01-05 RX ADMIN — Medication: at 09:10

## 2020-01-05 RX ADMIN — NYSTATIN: 100000 POWDER TOPICAL at 09:04

## 2020-01-05 RX ADMIN — SENNOSIDES AND DOCUSATE SODIUM 1 TABLET: 8.6; 5 TABLET ORAL at 09:04

## 2020-01-05 RX ADMIN — FERROUS SULFATE TAB 325 MG (65 MG ELEMENTAL FE) 325 MG: 325 (65 FE) TAB at 09:04

## 2020-01-05 RX ADMIN — Medication 10 ML: at 20:36

## 2020-01-05 RX ADMIN — ENOXAPARIN SODIUM 40 MG: 40 INJECTION SUBCUTANEOUS at 09:04

## 2020-01-05 RX ADMIN — NYSTATIN: 100000 POWDER TOPICAL at 17:31

## 2020-01-05 RX ADMIN — Medication 10 ML: at 17:31

## 2020-01-05 NOTE — PROGRESS NOTES
Omar Naik [facility] 79  [address]  [phone]      Medical Progress Note      NAME: Sophie Brothers   :  1940  MRM:  [id number]    Date/Time: 2020         Subjective:     Chief Complaint:  Patient was seen and examined by me. Chart reviewed. Not agitated, no events overnight       Objective:       Vitals:       Last 24hrs VS reviewed since prior progress note. Most recent are:    Visit Vitals  /61 (BP 1 Location: Left arm, BP Patient Position: At rest)   Pulse 60   Temp 98.1 °F (36.7 °C)   Resp 18   Ht 5' (1.524 m)   Wt 49.9 kg (110 lb)   SpO2 99%   Breastfeeding No   BMI 21.48 kg/m²     SpO2 Readings from Last 6 Encounters:   20 99%   19 99%   19 100%   19 99%   10/31/19 99%   10/13/19 98%            Intake/Output Summary (Last 24 hours) at 2020 1043  Last data filed at 2020 1422  Gross per 24 hour   Intake 240 ml   Output    Net 240 ml        Exam:     Physical Exam:    Gen:  Elderly, frail, disheveled, NAD  HEENT:  Pink conjunctivae, PERRL, hard of hearing  Neck:  Supple, without masses, thyroid non-tender  Resp:  No accessory muscle use, clear breath sounds without wheezes rales or rhonchi  Card:  No murmurs, normal S1, S2 without thrills, bruits or peripheral edema  Abd:  Soft, non-tender, non-distended  Musc:  No cyanosis or clubbing  Skin:  No rashes  Neuro: Moves all ext, followed some commands  Psych:  poor insight, oriented to person.   Confused     Medications Reviewed: (see below)    Lab Data Reviewed: (see below)    ______________________________________________________________________    Medications:     Current Facility-Administered Medications   Medication Dose Route Frequency    senna-docusate (PERICOLACE) 8.6-50 mg per tablet 1 Tab  1 Tab Oral DAILY    ferrous sulfate tablet 325 mg  1 Tab Oral DAILY WITH BREAKFAST    nystatin (MYCOSTATIN) 100,000 unit/gram powder   Topical BID    zinc oxide-cod liver oil (DESITIN) 40 % paste   Topical DAILY    sodium chloride (NS) flush 5-40 mL  5-40 mL IntraVENous Q8H    sodium chloride (NS) flush 5-40 mL  5-40 mL IntraVENous PRN    enoxaparin (LOVENOX) injection 40 mg  40 mg SubCUTAneous Q24H          Lab Review:     No results for input(s): WBC, HGB, HCT, PLT, HGBEXT, HCTEXT, PLTEXT, HGBEXT, HCTEXT, PLTEXT in the last 72 hours. No results for input(s): NA, K, CL, CO2, GLU, BUN, CREA, CA, MG, PHOS, ALB, TBIL, TBILI, SGOT, ALT, INR, INREXT, INREXT in the last 72 hours. No results found for: GLUCPOC       Assessment / Plan:     66 yo hx of HTN, dementia, kidney stones, hydronephrosis s/p R stent, presented w/ falls, weakness    1) Frequent falls/weakness/debility: now stable. Still requiring significant help with ambulation. Weakness due to dementia, multiple hospitalizations. Cont PT/OT. Awaiting SNF placement. Level 2 pending. CM following    2) HTN: BP stable. Not on meds    3) Bilateral kidney stones/hydronephrosis: s/p recent R ureteral stent. F/u with Urology. Check BMP prn    4) Anemia: likely has iron def. Cont oral iron. Monitor Hgb prn    5) Dementia: confused. Not agitated.   Cont to monitor closely    Total time spent with patient: 10 min                  Care Plan discussed with: Patient, nursing, CM    Discussed:  Care Plan    Prophylaxis:  Lovenox    Disposition:  SNF/LTC (level 2 pending)           ___________________________________________________    Attending Physician: Artur Forte MD

## 2020-01-06 VITALS
OXYGEN SATURATION: 100 % | HEIGHT: 60 IN | SYSTOLIC BLOOD PRESSURE: 122 MMHG | TEMPERATURE: 98.3 F | DIASTOLIC BLOOD PRESSURE: 63 MMHG | BODY MASS INDEX: 21.6 KG/M2 | HEART RATE: 60 BPM | RESPIRATION RATE: 16 BRPM | WEIGHT: 110 LBS

## 2020-01-06 LAB
ANION GAP SERPL CALC-SCNC: 3 MMOL/L (ref 5–15)
BUN SERPL-MCNC: 14 MG/DL (ref 6–20)
BUN/CREAT SERPL: 34 (ref 12–20)
CALCIUM SERPL-MCNC: 8 MG/DL (ref 8.5–10.1)
CHLORIDE SERPL-SCNC: 109 MMOL/L (ref 97–108)
CO2 SERPL-SCNC: 28 MMOL/L (ref 21–32)
CREAT SERPL-MCNC: 0.41 MG/DL (ref 0.55–1.02)
ERYTHROCYTE [DISTWIDTH] IN BLOOD BY AUTOMATED COUNT: 13.6 % (ref 11.5–14.5)
GLUCOSE SERPL-MCNC: 85 MG/DL (ref 65–100)
HCT VFR BLD AUTO: 32 % (ref 35–47)
HGB BLD-MCNC: 10.2 G/DL (ref 11.5–16)
MAGNESIUM SERPL-MCNC: 2 MG/DL (ref 1.6–2.4)
MCH RBC QN AUTO: 30.6 PG (ref 26–34)
MCHC RBC AUTO-ENTMCNC: 31.9 G/DL (ref 30–36.5)
MCV RBC AUTO: 96.1 FL (ref 80–99)
NRBC # BLD: 0 K/UL (ref 0–0.01)
NRBC BLD-RTO: 0 PER 100 WBC
PHOSPHATE SERPL-MCNC: 2.9 MG/DL (ref 2.6–4.7)
PLATELET # BLD AUTO: 302 K/UL (ref 150–400)
PMV BLD AUTO: 10.9 FL (ref 8.9–12.9)
POTASSIUM SERPL-SCNC: 4 MMOL/L (ref 3.5–5.1)
RBC # BLD AUTO: 3.33 M/UL (ref 3.8–5.2)
SODIUM SERPL-SCNC: 140 MMOL/L (ref 136–145)
WBC # BLD AUTO: 7.9 K/UL (ref 3.6–11)

## 2020-01-06 PROCEDURE — 85027 COMPLETE CBC AUTOMATED: CPT

## 2020-01-06 PROCEDURE — 80048 BASIC METABOLIC PNL TOTAL CA: CPT

## 2020-01-06 PROCEDURE — 36415 COLL VENOUS BLD VENIPUNCTURE: CPT

## 2020-01-06 PROCEDURE — 74011250637 HC RX REV CODE- 250/637: Performed by: INTERNAL MEDICINE

## 2020-01-06 PROCEDURE — 84100 ASSAY OF PHOSPHORUS: CPT

## 2020-01-06 PROCEDURE — 74011250636 HC RX REV CODE- 250/636: Performed by: INTERNAL MEDICINE

## 2020-01-06 PROCEDURE — 83735 ASSAY OF MAGNESIUM: CPT

## 2020-01-06 RX ORDER — LANOLIN ALCOHOL/MO/W.PET/CERES
325 CREAM (GRAM) TOPICAL
Qty: 30 TAB | Refills: 0 | Status: SHIPPED
Start: 2020-01-07 | End: 2020-09-24

## 2020-01-06 RX ADMIN — ENOXAPARIN SODIUM 40 MG: 40 INJECTION SUBCUTANEOUS at 08:48

## 2020-01-06 RX ADMIN — NYSTATIN: 100000 POWDER TOPICAL at 08:48

## 2020-01-06 RX ADMIN — Medication: at 08:53

## 2020-01-06 RX ADMIN — SENNOSIDES AND DOCUSATE SODIUM 1 TABLET: 8.6; 5 TABLET ORAL at 08:49

## 2020-01-06 RX ADMIN — FERROUS SULFATE TAB 325 MG (65 MG ELEMENTAL FE) 325 MG: 325 (65 FE) TAB at 08:49

## 2020-01-06 NOTE — DISCHARGE INSTRUCTIONS
Patient Discharge Instructions    Pooja [facility] / [id number] : 1940    Admitted 2019 Discharged: 2020     Primary Diagnoses  Problem List as of 2019 Date Reviewed: 2019           * (Principal) Weak   Hard of hearing   Dementia ([facility] [address]   Debility   Pneumonia   HTN (hypertension)   Hypokalemia due to loss of potassium   Leukocytosis   Hydronephrosis of right kidney   Bilateral kidney stones          Take Home Medications     · It is important that you take the medication exactly as they are prescribed. · Keep your medication in the bottles provided by the pharmacist and keep a list of the medication names, dosages, and times to be taken in your wallet. · Do not take other medications without consulting your doctor. What to do at Home    Recommended diet: Regular Diet    Recommended activity: Activity as tolerated and PT/OT Eval and Treat    If you experience worse weakness, please follow up with your PCP. Follow-up with your PCP in a few weeks        Information obtained by :  I understand that if any problems occur once I am at home I am to contact my physician. I understand and acknowledge receipt of the instructions indicated above.                                                                                                                                            Physician's or R.N.'s Signature                                                                  Date/Time                                                                                                                                              Patient or Representative Signature                                                          Date/Time

## 2020-01-06 NOTE — PROGRESS NOTES
1-6-2029 CASE MANAGEMENT NOTE:  I received a fax from [facility] and they have approved patient going to a SNF based on the Level II screening/recommendation. She has been accepted by [facility] and I notified her Zaydapishay Sneed ([phone])-who will make arrangements to sign the paperwork at the SNF. The patient was informed of the arrangements. I sent the discharge instructions, today's MD progress note and the [facility] to [facility] via [employer] and this will also accompany her. Also in the packet to accompany the patient is the Inpatient order dated 1-2-2020, the response from [facility] for the Level II and the original updated UAI. Transition of Care Plan to SNF/Rehab    SNF/Rehab Transition:  Patient has been accepted to [facility] and meets criteria for admission. Patient will transported by [facility] and expected to leave at 1:00 PM.    Communication to Patient/Family:  Met with patient and Madan Jain (identified care giver) and they are agreeable to the transition plan. Communication to SNF/Rehab:  Bedside RN, Elidia Ferro, has been notified to update the transition plan to the facility and call report (phone number [phone] ). Discharge information has been updated on the AVS.     Discharge instructions to be fax'd to facility at [facility] # via [employer]). SNF/Rehab Transition:  Patient to follow-up with Home Health:  [facility], other  ,none)  PCP/Specialist:   Ambulatory Care Management:     Reviewed and confirmed with facility, [facility] they can manage the patient care needs for the following:     Melisa Chan with (X) only those applicable:    Medication:  [x]  Medications will be available at the facility  []  IV Antibiotics   []  Controlled Substance - hard copy to be sent with patient   []  Weekly Labs   Documents:  [] Hard RX  [] MAR  [] Kardex  [x] AVS  []Transfer Summary  []Discharge   Equipment:  []  CPAP/BiPAP  []  Wound Vacuum  []  Kam or Urinary Device  []  PICC/Central Line  [] Nebulizer  []  Ventilator   Treatment:  []Isolation (for MRSA, VRE, etc.)  []Surgical Drain Management  []Tracheostomy Care  []Dressing Changes  []Dialysis with transportation and chair time . []PEG Care  []Oxygen  []Daily Weights for Heart Failure   Dietary:  []Any diet limitations  []Tube Feedings   []Total Parenteral Management (TPN)   Eligible for Medicaid Long Term Services and Supports  Yes:  [x] Eligible for medical assistance or will become eligible within 180 days and UAI completed. [] Provider/Patient and/or support system has requested screening. [] UAI copy provided to patient or responsible party,.  [] UAI unavailable at discharge will send once processed to SNF provider. [] UAI unavailable at discharged mailed to patient  No:   [] Private pay and is not financially eligible for Medicaid within the next 180 days. [] Reside out-of-state.   [] A residents of a state owned/operated facility that is licensed  by [facility]  [] Enrollment in [facility] hospice services  [] [address] [place] [address]  [] Patient /Family declines to have screening completed or provide financial information for screening     Financial Resources:  Medicaid    [] Initiated and application pending   [] Full coverage     Advanced Care Plan:  []Surrogate Decision Maker of Care  []POA  []Communicated Code Status  (DDNR\", \"Full\")    Other

## 2020-01-06 NOTE — DISCHARGE SUMMARY
Physician Discharge Summary     Patient ID:  Brandy Alegre  [id number]  64 y.o.  1940    Admit date: 12/18/2019    Discharge date and time: 1/6/2020    Admission Diagnoses: Weak [R53.1]  'light-for-dates' infant with signs of fetal malnutrition [P05.00]  Weakness [R53.1]  Weakness [R53.1]  Confusion [R41.0]    Discharge Diagnoses:    Principal Problem:    Weak (12/19/2019)    Active Problems:    Bilateral kidney stones (12/15/2019)      HTN (hypertension) (12/16/2019)      Dementia ([address] (12/19/2019)      Debility (12/19/2019)      Confusion (1/2/2020)      Weakness (1/2/2020)           Hospital Course:   Yvetta Oats Pinky Brooms / Fall - Acute and chronic, related to acute deconditioning following recent hospitalization. Discharge to SNF today     Bilateral kidney stones / Hydronephrosis - S/p cystoscopy and right ureteral stent placement on last admission.  Follow-up with urology upon discharge     HTN (hypertension) - BP labile. norvasc stopped due to low bp. Will continue to hold for now     Dementia / Hard of hearing - Supportive care. Would benefit from outpatient neuropsych testing of official Dx and Rx. PCP: Marko Castro MD     Consults: None    Condition of patient at discharge: improved    Discharge Exam:  Please refer to progress note from today. Disposition: home    Patient Instructions:   Current Discharge Medication List      START taking these medications    Details   ferrous sulfate 325 mg (65 mg iron) tablet Take 1 Tab by mouth daily (with breakfast).   Qty: 30 Tab, Refills: 0         STOP taking these medications       loperamide (IMMODIUM) 2 mg tablet Comments:   Reason for Stopping:         amLODIPine (NORVASC) 5 mg tablet Comments:   Reason for Stopping:         Hydrochlorothiazide (HYDRODIURIL) 12.5 mg tablet Comments:   Reason for Stopping:             Activity: Activity as tolerated  Diet: Resume previous diet  Wound Care: None needed    Follow-up with Marko Castro MD in 1 week.  Follow-up tests/labs none    Approximate time spent in patient care on day of discharge: 33 minutes    Signed:  Tj Forrest MD  1/6/2020  9:52 AM

## 2020-01-06 NOTE — PROGRESS NOTES
[facility]    Medical Progress Note      NAME: Roxie Nolasco   :  1940  MRM:  [id number]    Date/Time: 2020        Assessment and Plan:     Weak / Jensen David / Fall - Acute and chronic, related to acute deconditioning following recent hospitalization. Discharge to SNF today     Bilateral kidney stones / Hydronephrosis - S/p cystoscopy and right ureteral stent placement on last admission. Follow-up with urology upon discharge     HTN (hypertension) - BP labile. norvasc stopped due to low bp. Will continue to hold for now     Dementia / Hard of hearing - Supportive care. Would benefit from outpatient neuropsych testing of official Dx and Rx. She does not have a dx of down syndrome       Subjective:     Chief Complaint: weak    ROS:  (bold if positive, if negative)    Tolerating some PT  Tolerating Diet        Objective:     Last 24hrs VS reviewed since prior progress note.  Most recent are:    Visit Vitals  /75 (BP 1 Location: Left arm, BP Patient Position: At rest)   Pulse (!) 58   Temp 98.4 °F (36.9 °C)   Resp 16   Ht 5' (1.524 m)   Wt 49.9 kg (110 lb)   SpO2 95%   Breastfeeding No   BMI 21.48 kg/m²     SpO2 Readings from Last 6 Encounters:   20 95%   19 99%   19 100%   19 99%   10/31/19 99%   10/13/19 98%            Intake/Output Summary (Last 24 hours) at 2020 0953  Last data filed at 2020 1411  Gross per 24 hour   Intake 240 ml   Output    Net 240 ml        Physical Exam:    Gen:  Thin, frail, in no acute distress  HEENT:  Pink conjunctivae, PERRL, hearing intact to voice, moist mucous membranes  Neck:  Supple, without masses, thyroid non-tender  Resp:  No accessory muscle use, clear breath sounds without wheezes rales or rhonchi  Card:  No murmurs, normal S1, S2 without thrills, bruits or peripheral edema  Abd:  Soft, non-tender, non-distended, normoactive bowel sounds are present, no mass  Lymph:  No cervical or inguinal adenopathy  Musc:  No cyanosis or clubbing  Skin:  No rashes or ulcers, skin turgor is good  Neuro:  Cranial nerves are grossly intact, general motor weakness, follows commands   Psych:  Poor insight, oriented to person, place     Telemetry reviewed:   normal sinus rhythm  __________________________________________________________________  Medications Reviewed: (see below)  Medications:     Current Facility-Administered Medications   Medication Dose Route Frequency    senna-docusate (PERICOLACE) 8.6-50 mg per tablet 1 Tab  1 Tab Oral DAILY    ferrous sulfate tablet 325 mg  1 Tab Oral DAILY WITH BREAKFAST    nystatin (MYCOSTATIN) 100,000 unit/gram powder   Topical BID    zinc oxide-cod liver oil (DESITIN) 40 % paste   Topical DAILY    sodium chloride (NS) flush 5-40 mL  5-40 mL IntraVENous Q8H    sodium chloride (NS) flush 5-40 mL  5-40 mL IntraVENous PRN    enoxaparin (LOVENOX) injection 40 mg  40 mg SubCUTAneous Q24H        Lab Data Reviewed: (see below)  Lab Review:     Recent Labs     01/06/20 0312   WBC 7.9   HGB 10.2*   HCT 32.0*        Recent Labs     01/06/20 0312      K 4.0   *   CO2 28   GLU 85   BUN 14   CREA 0.41*   CA 8.0*   MG 2.0   PHOS 2.9     No results found for: GLUCPOC  No results for input(s): PH, PCO2, PO2, HCO3, FIO2 in the last 72 hours. No results for input(s): INR, INREXT, INREXT in the last 72 hours. All Micro Results     Procedure Component Value Units Date/Time    FEDERICA White, UR/CSF [[id number]] Collected:  12/19/19 0033    Order Status:  Completed Specimen:  Miscellaneous sample Updated:  12/24/19 1535     Source URINE        Specimen Urine     Streptococcus pneumoniae Ag NEGATIVE         Fluid culture Not indicated. Organism ID Not indicated. Please note Comment        Comment: (NOTE)  College of American Pathologists standards require a culture to be  performed on CSF specimens submitted for bacterial antigen testing.   (CAP [id number]) Urine specimens will not be cultured. Performed At: [facility] [address] [id number]  Maryjo Frankel MD XS:[id number]         Dean Patricio [[id number]] Collected:  12/19/19 0033    Order Status:  Completed Specimen:  Urine Updated:  12/23/19 1436     Source URINE        L pneumophila S1 Ag, urine NEGATIVE         Comment: (NOTE)  Presumptive negative for L. pneumophila serogroup 1 antigen in urine,  suggesting no recent or current infection. Legionnaires' disease  cannot be ruled out since other serogroups and species may also  cause disease. Performed At: [address] [id number]  Maryjo Frankel MD OF:[id number]         Marisol Singh 20 [[id number]] Collected:  12/19/19 1231    Order Status:  Completed Specimen:  Nasopharyngeal Updated:  12/19/19 1841     Adenovirus NOT DETECTED        Coronavirus 229E NOT DETECTED        Coronavirus HKU1 NOT DETECTED        Coronavirus CVNL63 NOT DETECTED        Coronavirus OC43 NOT DETECTED        Metapneumovirus NOT DETECTED        Rhinovirus and Enterovirus NOT DETECTED        Influenza A NOT DETECTED        Influenza A, subtype H1 NOT DETECTED        Influenza A, subtype H3 NOT DETECTED        INFLUENZA A H1N1 PCR NOT DETECTED        Influenza B NOT DETECTED        Parainfluenza 1 NOT DETECTED        Parainfluenza 2 NOT DETECTED        Parainfluenza 3 NOT DETECTED        Parainfluenza virus 4 NOT DETECTED        RSV by PCR NOT DETECTED        B. parapertussis, PCR NOT DETECTED        Bordetella pertussis - PCR NOT DETECTED        Chlamydophila pneumoniae DNA, QL, PCR NOT DETECTED        Mycoplasma pneumoniae DNA, QL, PCR NOT DETECTED             Other pertinent lab: none    Total time spent with patient: 35 Minutes      I reviewed chart, notes, data and current medications in the medical record. I have examined and treated the patient at bedside during this period.                  Care Plan discussed with: Patient, Care Manager, Nursing Staff and >50% of time spent in counseling and coordination of care    Discussed:  Care Plan and D/C Planning    Prophylaxis:  H2B/PPI    Disposition:  SNF/LTC           ___________________________________________________    Attending Physician: Terry Quinones MD

## 2020-01-20 ENCOUNTER — TELEPHONE (OUTPATIENT)
Dept: FAMILY MEDICINE CLINIC | Age: 80
End: 2020-01-20

## 2020-01-20 NOTE — TELEPHONE ENCOUNTER
I did try to call all phone numbers listed (to let them know patient would have to schedule and appt to be seen) and had no luck but I'm thinking this patient is somewhere in another type of facility? ?     Please advise thanks

## 2020-01-20 NOTE — TELEPHONE ENCOUNTER
----- Message from Aubree Lainez sent at 1/20/2020 10:02 AM EST -----  Regarding: Dr. Afia Sheffield: [phone]  Caller's first and last name: Tommy Amin (caregiver)  Reason for call: Caller wanted to inform the doctor that pt's left eye is red and inflamed. Caller would like to know what should be done or if a medication or eye drop can be prescribed.    Callback required yes/no and why: Yes  Best contact number(s): [phone]   or   [phone]  Details to clarify the request: na

## 2020-01-20 NOTE — TELEPHONE ENCOUNTER
Attempted again to call #s listed NA on cell phone and LVM at the [facility] # listed for the caregiver in the message stating that patient would have to be seen for an appointment    Will close enc

## 2020-09-24 ENCOUNTER — APPOINTMENT (OUTPATIENT)
Dept: GENERAL RADIOLOGY | Age: 80
DRG: 493 | End: 2020-09-24
Attending: STUDENT IN AN ORGANIZED HEALTH CARE EDUCATION/TRAINING PROGRAM
Payer: MEDICARE

## 2020-09-24 ENCOUNTER — APPOINTMENT (OUTPATIENT)
Dept: GENERAL RADIOLOGY | Age: 80
DRG: 493 | End: 2020-09-24
Attending: EMERGENCY MEDICINE
Payer: MEDICARE

## 2020-09-24 ENCOUNTER — APPOINTMENT (OUTPATIENT)
Dept: GENERAL RADIOLOGY | Age: 80
DRG: 493 | End: 2020-09-24
Attending: PHYSICIAN ASSISTANT
Payer: MEDICARE

## 2020-09-24 ENCOUNTER — APPOINTMENT (OUTPATIENT)
Dept: CT IMAGING | Age: 80
DRG: 493 | End: 2020-09-24
Attending: PHYSICIAN ASSISTANT
Payer: MEDICARE

## 2020-09-24 ENCOUNTER — APPOINTMENT (OUTPATIENT)
Dept: GENERAL RADIOLOGY | Age: 80
DRG: 493 | End: 2020-09-24
Attending: FAMILY MEDICINE
Payer: MEDICARE

## 2020-09-24 ENCOUNTER — HOSPITAL ENCOUNTER (INPATIENT)
Age: 80
LOS: 4 days | Discharge: NURSING, REHAB OR LONG-TERM CARE | DRG: 493 | End: 2020-09-28
Attending: EMERGENCY MEDICINE | Admitting: FAMILY MEDICINE
Payer: MEDICARE

## 2020-09-24 ENCOUNTER — ANESTHESIA (OUTPATIENT)
Dept: SURGERY | Age: 80
DRG: 493 | End: 2020-09-24
Payer: MEDICARE

## 2020-09-24 ENCOUNTER — APPOINTMENT (OUTPATIENT)
Dept: GENERAL RADIOLOGY | Age: 80
DRG: 493 | End: 2020-09-24
Attending: ORTHOPAEDIC SURGERY
Payer: MEDICARE

## 2020-09-24 ENCOUNTER — ANESTHESIA EVENT (OUTPATIENT)
Dept: SURGERY | Age: 80
DRG: 493 | End: 2020-09-24
Payer: MEDICARE

## 2020-09-24 DIAGNOSIS — I48.91 ATRIAL FIBRILLATION WITH RVR (HCC): ICD-10-CM

## 2020-09-24 DIAGNOSIS — R53.81 DEBILITY: Chronic | ICD-10-CM

## 2020-09-24 DIAGNOSIS — F41.9 ANXIETY: ICD-10-CM

## 2020-09-24 DIAGNOSIS — S82.202A CLOSED FRACTURE OF LEFT TIBIA AND FIBULA, INITIAL ENCOUNTER: Primary | ICD-10-CM

## 2020-09-24 DIAGNOSIS — D72.829 LEUKOCYTOSIS, UNSPECIFIED TYPE: ICD-10-CM

## 2020-09-24 DIAGNOSIS — I10 ESSENTIAL HYPERTENSION: Chronic | ICD-10-CM

## 2020-09-24 DIAGNOSIS — S82.402A CLOSED FRACTURE OF LEFT TIBIA AND FIBULA, INITIAL ENCOUNTER: Primary | ICD-10-CM

## 2020-09-24 DIAGNOSIS — N13.30 HYDRONEPHROSIS OF RIGHT KIDNEY: ICD-10-CM

## 2020-09-24 DIAGNOSIS — E87.6 HYPOKALEMIA DUE TO LOSS OF POTASSIUM: ICD-10-CM

## 2020-09-24 DIAGNOSIS — F03.91 DEMENTIA WITH BEHAVIORAL DISTURBANCE, UNSPECIFIED DEMENTIA TYPE: Chronic | ICD-10-CM

## 2020-09-24 DIAGNOSIS — I49.8 SINUS ARRHYTHMIA: ICD-10-CM

## 2020-09-24 DIAGNOSIS — N30.01 ACUTE CYSTITIS WITH HEMATURIA: ICD-10-CM

## 2020-09-24 PROBLEM — R53.1 WEAKNESS: Status: RESOLVED | Noted: 2020-01-02 | Resolved: 2020-09-24

## 2020-09-24 PROBLEM — R53.1 WEAK: Status: RESOLVED | Noted: 2019-12-19 | Resolved: 2020-09-24

## 2020-09-24 PROBLEM — S82.409A FRACTURE OF TIBIA AND FIBULA: Status: ACTIVE | Noted: 2020-09-24

## 2020-09-24 PROBLEM — S82.209A FRACTURE OF TIBIA AND FIBULA: Status: ACTIVE | Noted: 2020-09-24

## 2020-09-24 LAB
ANION GAP SERPL CALC-SCNC: 3 MMOL/L (ref 5–15)
BASOPHILS # BLD: 0.1 K/UL (ref 0–0.1)
BASOPHILS NFR BLD: 1 % (ref 0–1)
BUN SERPL-MCNC: 18 MG/DL (ref 6–20)
BUN/CREAT SERPL: 30 (ref 12–20)
CALCIUM SERPL-MCNC: 8.7 MG/DL (ref 8.5–10.1)
CHLORIDE SERPL-SCNC: 111 MMOL/L (ref 97–108)
CK SERPL-CCNC: 162 U/L (ref 26–192)
CO2 SERPL-SCNC: 28 MMOL/L (ref 21–32)
COMMENT, HOLDF: NORMAL
COVID-19 RAPID TEST, COVR: NOT DETECTED
CREAT SERPL-MCNC: 0.6 MG/DL (ref 0.55–1.02)
DIFFERENTIAL METHOD BLD: ABNORMAL
EOSINOPHIL # BLD: 0.1 K/UL (ref 0–0.4)
EOSINOPHIL NFR BLD: 0 % (ref 0–7)
ERYTHROCYTE [DISTWIDTH] IN BLOOD BY AUTOMATED COUNT: 13.5 % (ref 11.5–14.5)
GLUCOSE SERPL-MCNC: 111 MG/DL (ref 65–100)
HCT VFR BLD AUTO: 40.8 % (ref 35–47)
HEALTH STATUS, XMCV2T: NORMAL
HGB BLD-MCNC: 13.3 G/DL (ref 11.5–16)
IMM GRANULOCYTES # BLD AUTO: 0.1 K/UL (ref 0–0.04)
IMM GRANULOCYTES NFR BLD AUTO: 1 % (ref 0–0.5)
INR PPP: 1 (ref 0.9–1.1)
LACTATE SERPL-SCNC: 1 MMOL/L (ref 0.4–2)
LYMPHOCYTES # BLD: 1.5 K/UL (ref 0.8–3.5)
LYMPHOCYTES NFR BLD: 9 % (ref 12–49)
MAGNESIUM SERPL-MCNC: 2.2 MG/DL (ref 1.6–2.4)
MCH RBC QN AUTO: 30.9 PG (ref 26–34)
MCHC RBC AUTO-ENTMCNC: 32.6 G/DL (ref 30–36.5)
MCV RBC AUTO: 94.9 FL (ref 80–99)
MONOCYTES # BLD: 1 K/UL (ref 0–1)
MONOCYTES NFR BLD: 6 % (ref 5–13)
NEUTS SEG # BLD: 14 K/UL (ref 1.8–8)
NEUTS SEG NFR BLD: 83 % (ref 32–75)
NRBC # BLD: 0 K/UL (ref 0–0.01)
NRBC BLD-RTO: 0 PER 100 WBC
PHOSPHATE SERPL-MCNC: 3.3 MG/DL (ref 2.6–4.7)
PLATELET # BLD AUTO: 308 K/UL (ref 150–400)
PMV BLD AUTO: 10.3 FL (ref 8.9–12.9)
POTASSIUM SERPL-SCNC: 4.6 MMOL/L (ref 3.5–5.1)
PROTHROMBIN TIME: 10.3 SEC (ref 9–11.1)
RBC # BLD AUTO: 4.3 M/UL (ref 3.8–5.2)
SAMPLES BEING HELD,HOLD: NORMAL
SODIUM SERPL-SCNC: 142 MMOL/L (ref 136–145)
SOURCE, COVRS: NORMAL
SPECIMEN SOURCE, FCOV2M: NORMAL
SPECIMEN TYPE, XMCV1T: NORMAL
TROPONIN I SERPL-MCNC: <0.05 NG/ML
WBC # BLD AUTO: 16.9 K/UL (ref 3.6–11)

## 2020-09-24 PROCEDURE — L4360 PNEUMAT WALKING BOOT PRE CST: HCPCS | Performed by: ORTHOPAEDIC SURGERY

## 2020-09-24 PROCEDURE — 2709999900 HC NON-CHARGEABLE SUPPLY: Performed by: ORTHOPAEDIC SURGERY

## 2020-09-24 PROCEDURE — C1713 ANCHOR/SCREW BN/BN,TIS/BN: HCPCS | Performed by: ORTHOPAEDIC SURGERY

## 2020-09-24 PROCEDURE — 84100 ASSAY OF PHOSPHORUS: CPT

## 2020-09-24 PROCEDURE — 99285 EMERGENCY DEPT VISIT HI MDM: CPT

## 2020-09-24 PROCEDURE — 0QSH04Z REPOSITION LEFT TIBIA WITH INTERNAL FIXATION DEVICE, OPEN APPROACH: ICD-10-PCS | Performed by: ORTHOPAEDIC SURGERY

## 2020-09-24 PROCEDURE — 77030018836 HC SOL IRR NACL ICUM -A: Performed by: ORTHOPAEDIC SURGERY

## 2020-09-24 PROCEDURE — 77030026438 HC STYL ET INTUB CARD -A: Performed by: ANESTHESIOLOGY

## 2020-09-24 PROCEDURE — 84484 ASSAY OF TROPONIN QUANT: CPT

## 2020-09-24 PROCEDURE — 74011000250 HC RX REV CODE- 250: Performed by: NURSE ANESTHETIST, CERTIFIED REGISTERED

## 2020-09-24 PROCEDURE — 73700 CT LOWER EXTREMITY W/O DYE: CPT

## 2020-09-24 PROCEDURE — 74011250636 HC RX REV CODE- 250/636: Performed by: STUDENT IN AN ORGANIZED HEALTH CARE EDUCATION/TRAINING PROGRAM

## 2020-09-24 PROCEDURE — 76210000006 HC OR PH I REC 0.5 TO 1 HR: Performed by: ORTHOPAEDIC SURGERY

## 2020-09-24 PROCEDURE — 65270000029 HC RM PRIVATE

## 2020-09-24 PROCEDURE — 85025 COMPLETE CBC W/AUTO DIFF WBC: CPT

## 2020-09-24 PROCEDURE — 85610 PROTHROMBIN TIME: CPT

## 2020-09-24 PROCEDURE — 96374 THER/PROPH/DIAG INJ IV PUSH: CPT

## 2020-09-24 PROCEDURE — 0QSH36Z REPOSITION LEFT TIBIA WITH INTRAMEDULLARY INTERNAL FIXATION DEVICE, PERCUTANEOUS APPROACH: ICD-10-PCS | Performed by: ORTHOPAEDIC SURGERY

## 2020-09-24 PROCEDURE — 36415 COLL VENOUS BLD VENIPUNCTURE: CPT

## 2020-09-24 PROCEDURE — 76060000035 HC ANESTHESIA 2 TO 2.5 HR: Performed by: ORTHOPAEDIC SURGERY

## 2020-09-24 PROCEDURE — 73590 X-RAY EXAM OF LOWER LEG: CPT

## 2020-09-24 PROCEDURE — 87635 SARS-COV-2 COVID-19 AMP PRB: CPT

## 2020-09-24 PROCEDURE — 74011250636 HC RX REV CODE- 250/636: Performed by: NURSE ANESTHETIST, CERTIFIED REGISTERED

## 2020-09-24 PROCEDURE — 77030014599 HC RMR ROD GD DISP SYNT -C: Performed by: ORTHOPAEDIC SURGERY

## 2020-09-24 PROCEDURE — 77030016474 HC BIT DRL QC3 SYNT -C: Performed by: ORTHOPAEDIC SURGERY

## 2020-09-24 PROCEDURE — 77030003862 HC BIT DRL SYNT -B: Performed by: ORTHOPAEDIC SURGERY

## 2020-09-24 PROCEDURE — 77030008462 HC STPLR SKN PROX J&J -A: Performed by: ORTHOPAEDIC SURGERY

## 2020-09-24 PROCEDURE — 77030013079 HC BLNKT BAIR HGGR 3M -A: Performed by: ANESTHESIOLOGY

## 2020-09-24 PROCEDURE — 77030002916 HC SUT ETHLN J&J -A: Performed by: ORTHOPAEDIC SURGERY

## 2020-09-24 PROCEDURE — 74011250636 HC RX REV CODE- 250/636: Performed by: EMERGENCY MEDICINE

## 2020-09-24 PROCEDURE — 77030031139 HC SUT VCRL2 J&J -A: Performed by: ORTHOPAEDIC SURGERY

## 2020-09-24 PROCEDURE — 73502 X-RAY EXAM HIP UNI 2-3 VIEWS: CPT

## 2020-09-24 PROCEDURE — 83605 ASSAY OF LACTIC ACID: CPT

## 2020-09-24 PROCEDURE — 71045 X-RAY EXAM CHEST 1 VIEW: CPT

## 2020-09-24 PROCEDURE — 93005 ELECTROCARDIOGRAM TRACING: CPT

## 2020-09-24 PROCEDURE — 80048 BASIC METABOLIC PNL TOTAL CA: CPT

## 2020-09-24 PROCEDURE — 74011250636 HC RX REV CODE- 250/636: Performed by: ANESTHESIOLOGY

## 2020-09-24 PROCEDURE — 82550 ASSAY OF CK (CPK): CPT

## 2020-09-24 PROCEDURE — 77030000032 HC CUF TRNQT ZIMM -B: Performed by: ORTHOPAEDIC SURGERY

## 2020-09-24 PROCEDURE — 76010000131 HC OR TIME 2 TO 2.5 HR: Performed by: ORTHOPAEDIC SURGERY

## 2020-09-24 PROCEDURE — 77030000031 HC BIT DRL QC SYNT -C: Performed by: ORTHOPAEDIC SURGERY

## 2020-09-24 PROCEDURE — 77030018673: Performed by: ORTHOPAEDIC SURGERY

## 2020-09-24 PROCEDURE — 74011250636 HC RX REV CODE- 250/636: Performed by: ORTHOPAEDIC SURGERY

## 2020-09-24 PROCEDURE — 83735 ASSAY OF MAGNESIUM: CPT

## 2020-09-24 PROCEDURE — 77030008684 HC TU ET CUF COVD -B: Performed by: ANESTHESIOLOGY

## 2020-09-24 DEVICE — SCREW BNE L30MM DIA5MM TIB LT GRN TI ST CANN LOK FULL THRD: Type: IMPLANTABLE DEVICE | Site: LEG | Status: FUNCTIONAL

## 2020-09-24 DEVICE — SCREW BNE L36MM DIA3.5MM CORT S STL ST NONCANNULATED LOK: Type: IMPLANTABLE DEVICE | Site: LEG | Status: FUNCTIONAL

## 2020-09-24 DEVICE — SCREW BNE L36MM DIA5MM NONSTERILE TIB LT GRN TI ST CANN LOK: Type: IMPLANTABLE DEVICE | Site: LEG | Status: FUNCTIONAL

## 2020-09-24 DEVICE — SCREW BNE L35MM DIA4MM CANC S STL ST CANN NONLOCKING FULL: Type: IMPLANTABLE DEVICE | Site: LEG | Status: FUNCTIONAL

## 2020-09-24 DEVICE — IMPLANTABLE DEVICE: Type: IMPLANTABLE DEVICE | Site: LEG | Status: FUNCTIONAL

## 2020-09-24 DEVICE — SCREW BNE L34MM DIA5MM NONSTERILE TIB LT GRN TI ST CANN LOK: Type: IMPLANTABLE DEVICE | Site: LEG | Status: FUNCTIONAL

## 2020-09-24 DEVICE — SCREW BNE L26MM DIA5MM TIB LT GRN TI ST CANN LOK FULL THRD: Type: IMPLANTABLE DEVICE | Site: LEG | Status: FUNCTIONAL

## 2020-09-24 RX ORDER — DEXTROMETHORPHAN HYDROBROMIDE, GUAIFENESIN 5; 100 MG/5ML; MG/5ML
650 LIQUID ORAL EVERY 8 HOURS
COMMUNITY

## 2020-09-24 RX ORDER — LIDOCAINE HYDROCHLORIDE 10 MG/ML
0.1 INJECTION, SOLUTION EPIDURAL; INFILTRATION; INTRACAUDAL; PERINEURAL AS NEEDED
Status: DISCONTINUED | OUTPATIENT
Start: 2020-09-24 | End: 2020-09-24 | Stop reason: HOSPADM

## 2020-09-24 RX ORDER — BUSPIRONE HYDROCHLORIDE 10 MG/1
10 TABLET ORAL 2 TIMES DAILY
COMMUNITY

## 2020-09-24 RX ORDER — HYDROMORPHONE HYDROCHLORIDE 1 MG/ML
.25-1 INJECTION, SOLUTION INTRAMUSCULAR; INTRAVENOUS; SUBCUTANEOUS
Status: DISCONTINUED | OUTPATIENT
Start: 2020-09-24 | End: 2020-09-25 | Stop reason: HOSPADM

## 2020-09-24 RX ORDER — LANOLIN ALCOHOL/MO/W.PET/CERES
325 CREAM (GRAM) TOPICAL DAILY
Status: DISCONTINUED | OUTPATIENT
Start: 2020-09-25 | End: 2020-09-28 | Stop reason: HOSPADM

## 2020-09-24 RX ORDER — METOPROLOL TARTRATE 5 MG/5ML
INJECTION INTRAVENOUS AS NEEDED
Status: DISCONTINUED | OUTPATIENT
Start: 2020-09-24 | End: 2020-09-24 | Stop reason: HOSPADM

## 2020-09-24 RX ORDER — ROCURONIUM BROMIDE 10 MG/ML
INJECTION, SOLUTION INTRAVENOUS AS NEEDED
Status: DISCONTINUED | OUTPATIENT
Start: 2020-09-24 | End: 2020-09-24 | Stop reason: HOSPADM

## 2020-09-24 RX ORDER — POLYETHYLENE GLYCOL 3350 17 G/17G
17 POWDER, FOR SOLUTION ORAL DAILY
Status: DISCONTINUED | OUTPATIENT
Start: 2020-09-25 | End: 2020-09-28 | Stop reason: HOSPADM

## 2020-09-24 RX ORDER — ONDANSETRON 2 MG/ML
INJECTION INTRAMUSCULAR; INTRAVENOUS AS NEEDED
Status: DISCONTINUED | OUTPATIENT
Start: 2020-09-24 | End: 2020-09-24 | Stop reason: HOSPADM

## 2020-09-24 RX ORDER — HYDROMORPHONE HYDROCHLORIDE 1 MG/ML
0.2 INJECTION, SOLUTION INTRAMUSCULAR; INTRAVENOUS; SUBCUTANEOUS
Status: DISCONTINUED | OUTPATIENT
Start: 2020-09-24 | End: 2020-09-28 | Stop reason: HOSPADM

## 2020-09-24 RX ORDER — KETOTIFEN FUMARATE 0.35 MG/ML
1 SOLUTION/ DROPS OPHTHALMIC 2 TIMES DAILY
Status: DISCONTINUED | OUTPATIENT
Start: 2020-09-25 | End: 2020-09-24

## 2020-09-24 RX ORDER — PROPOFOL 10 MG/ML
INJECTION, EMULSION INTRAVENOUS AS NEEDED
Status: DISCONTINUED | OUTPATIENT
Start: 2020-09-24 | End: 2020-09-24 | Stop reason: HOSPADM

## 2020-09-24 RX ORDER — FENTANYL CITRATE 50 UG/ML
INJECTION, SOLUTION INTRAMUSCULAR; INTRAVENOUS AS NEEDED
Status: DISCONTINUED | OUTPATIENT
Start: 2020-09-24 | End: 2020-09-24 | Stop reason: HOSPADM

## 2020-09-24 RX ORDER — CEFAZOLIN SODIUM 1 G/3ML
INJECTION, POWDER, FOR SOLUTION INTRAMUSCULAR; INTRAVENOUS AS NEEDED
Status: DISCONTINUED | OUTPATIENT
Start: 2020-09-24 | End: 2020-09-24 | Stop reason: HOSPADM

## 2020-09-24 RX ORDER — BUSPIRONE HYDROCHLORIDE 10 MG/1
10 TABLET ORAL 2 TIMES DAILY
Status: DISCONTINUED | OUTPATIENT
Start: 2020-09-25 | End: 2020-09-28 | Stop reason: HOSPADM

## 2020-09-24 RX ORDER — KETOTIFEN FUMARATE 0.35 MG/ML
1 SOLUTION/ DROPS OPHTHALMIC
Status: DISCONTINUED | OUTPATIENT
Start: 2020-09-25 | End: 2020-09-28 | Stop reason: HOSPADM

## 2020-09-24 RX ORDER — SUCCINYLCHOLINE CHLORIDE 20 MG/ML
INJECTION INTRAMUSCULAR; INTRAVENOUS AS NEEDED
Status: DISCONTINUED | OUTPATIENT
Start: 2020-09-24 | End: 2020-09-24 | Stop reason: HOSPADM

## 2020-09-24 RX ORDER — DEXAMETHASONE SODIUM PHOSPHATE 4 MG/ML
INJECTION, SOLUTION INTRA-ARTICULAR; INTRALESIONAL; INTRAMUSCULAR; INTRAVENOUS; SOFT TISSUE AS NEEDED
Status: DISCONTINUED | OUTPATIENT
Start: 2020-09-24 | End: 2020-09-24 | Stop reason: HOSPADM

## 2020-09-24 RX ORDER — LIDOCAINE HYDROCHLORIDE 20 MG/ML
INJECTION, SOLUTION EPIDURAL; INFILTRATION; INTRACAUDAL; PERINEURAL AS NEEDED
Status: DISCONTINUED | OUTPATIENT
Start: 2020-09-24 | End: 2020-09-24 | Stop reason: HOSPADM

## 2020-09-24 RX ORDER — ESCITALOPRAM OXALATE 20 MG
20 TABLET ORAL DAILY
COMMUNITY

## 2020-09-24 RX ORDER — EPHEDRINE SULFATE/0.9% NACL/PF 50 MG/5 ML
SYRINGE (ML) INTRAVENOUS AS NEEDED
Status: DISCONTINUED | OUTPATIENT
Start: 2020-09-24 | End: 2020-09-24 | Stop reason: HOSPADM

## 2020-09-24 RX ORDER — SODIUM CHLORIDE 0.9 % (FLUSH) 0.9 %
5-40 SYRINGE (ML) INJECTION EVERY 8 HOURS
Status: DISCONTINUED | OUTPATIENT
Start: 2020-09-24 | End: 2020-09-28 | Stop reason: HOSPADM

## 2020-09-24 RX ORDER — FENTANYL CITRATE 50 UG/ML
25 INJECTION, SOLUTION INTRAMUSCULAR; INTRAVENOUS ONCE
Status: COMPLETED | OUTPATIENT
Start: 2020-09-24 | End: 2020-09-24

## 2020-09-24 RX ORDER — SODIUM CHLORIDE, SODIUM LACTATE, POTASSIUM CHLORIDE, CALCIUM CHLORIDE 600; 310; 30; 20 MG/100ML; MG/100ML; MG/100ML; MG/100ML
100 INJECTION, SOLUTION INTRAVENOUS CONTINUOUS
Status: DISCONTINUED | OUTPATIENT
Start: 2020-09-24 | End: 2020-09-24 | Stop reason: HOSPADM

## 2020-09-24 RX ORDER — SODIUM CHLORIDE, SODIUM LACTATE, POTASSIUM CHLORIDE, CALCIUM CHLORIDE 600; 310; 30; 20 MG/100ML; MG/100ML; MG/100ML; MG/100ML
100 INJECTION, SOLUTION INTRAVENOUS CONTINUOUS
Status: DISCONTINUED | OUTPATIENT
Start: 2020-09-25 | End: 2020-09-25 | Stop reason: HOSPADM

## 2020-09-24 RX ORDER — LANOLIN ALCOHOL/MO/W.PET/CERES
325 CREAM (GRAM) TOPICAL DAILY
COMMUNITY

## 2020-09-24 RX ORDER — POLYETHYLENE GLYCOL 3350 17 G/17G
17 POWDER, FOR SOLUTION ORAL DAILY
COMMUNITY

## 2020-09-24 RX ORDER — KETOTIFEN FUMARATE 0.35 MG/ML
1 SOLUTION/ DROPS OPHTHALMIC 2 TIMES DAILY
COMMUNITY

## 2020-09-24 RX ORDER — ESMOLOL HYDROCHLORIDE 10 MG/ML
INJECTION INTRAVENOUS AS NEEDED
Status: DISCONTINUED | OUTPATIENT
Start: 2020-09-24 | End: 2020-09-24 | Stop reason: HOSPADM

## 2020-09-24 RX ORDER — ROPIVACAINE HYDROCHLORIDE 5 MG/ML
INJECTION, SOLUTION EPIDURAL; INFILTRATION; PERINEURAL AS NEEDED
Status: DISCONTINUED | OUTPATIENT
Start: 2020-09-24 | End: 2020-09-24 | Stop reason: HOSPADM

## 2020-09-24 RX ORDER — SODIUM CHLORIDE 0.9 % (FLUSH) 0.9 %
5-40 SYRINGE (ML) INJECTION AS NEEDED
Status: DISCONTINUED | OUTPATIENT
Start: 2020-09-24 | End: 2020-09-28 | Stop reason: HOSPADM

## 2020-09-24 RX ORDER — ESCITALOPRAM OXALATE 10 MG/1
10 TABLET ORAL DAILY
Status: DISCONTINUED | OUTPATIENT
Start: 2020-09-25 | End: 2020-09-28 | Stop reason: HOSPADM

## 2020-09-24 RX ADMIN — FENTANYL CITRATE 25 MCG: 50 INJECTION, SOLUTION INTRAMUSCULAR; INTRAVENOUS at 22:09

## 2020-09-24 RX ADMIN — FENTANYL CITRATE 25 MCG: 50 INJECTION, SOLUTION INTRAMUSCULAR; INTRAVENOUS at 22:01

## 2020-09-24 RX ADMIN — HYDROMORPHONE HYDROCHLORIDE 0.25 MG: 1 INJECTION, SOLUTION INTRAMUSCULAR; INTRAVENOUS; SUBCUTANEOUS at 23:50

## 2020-09-24 RX ADMIN — FENTANYL CITRATE 25 MCG: 50 INJECTION, SOLUTION INTRAMUSCULAR; INTRAVENOUS at 12:27

## 2020-09-24 RX ADMIN — SODIUM CHLORIDE, SODIUM LACTATE, POTASSIUM CHLORIDE, AND CALCIUM CHLORIDE: 600; 310; 30; 20 INJECTION, SOLUTION INTRAVENOUS at 22:58

## 2020-09-24 RX ADMIN — FENTANYL CITRATE 25 MCG: 50 INJECTION, SOLUTION INTRAMUSCULAR; INTRAVENOUS at 15:16

## 2020-09-24 RX ADMIN — FENTANYL CITRATE 25 MCG: 50 INJECTION, SOLUTION INTRAMUSCULAR; INTRAVENOUS at 21:59

## 2020-09-24 RX ADMIN — ESMOLOL HYDROCHLORIDE 20 MG: 100 INJECTION, SOLUTION INTRAVENOUS at 22:05

## 2020-09-24 RX ADMIN — FENTANYL CITRATE 25 MCG: 50 INJECTION, SOLUTION INTRAMUSCULAR; INTRAVENOUS at 22:17

## 2020-09-24 RX ADMIN — LIDOCAINE HYDROCHLORIDE 60 MG: 20 INJECTION, SOLUTION EPIDURAL; INFILTRATION; INTRACAUDAL; PERINEURAL at 21:09

## 2020-09-24 RX ADMIN — Medication 10 MG: at 21:21

## 2020-09-24 RX ADMIN — METOPROLOL TARTRATE 2.5 MG: 5 INJECTION, SOLUTION INTRAVENOUS at 22:30

## 2020-09-24 RX ADMIN — ONDANSETRON HYDROCHLORIDE 4 MG: 2 SOLUTION INTRAMUSCULAR; INTRAVENOUS at 21:20

## 2020-09-24 RX ADMIN — FENTANYL CITRATE 100 MCG: 50 INJECTION, SOLUTION INTRAMUSCULAR; INTRAVENOUS at 21:09

## 2020-09-24 RX ADMIN — FENTANYL CITRATE 25 MCG: 50 INJECTION, SOLUTION INTRAMUSCULAR; INTRAVENOUS at 21:50

## 2020-09-24 RX ADMIN — PROPOFOL 150 MG: 10 INJECTION, EMULSION INTRAVENOUS at 21:10

## 2020-09-24 RX ADMIN — CEFAZOLIN SODIUM 2 G: 1 POWDER, FOR SOLUTION INTRAMUSCULAR; INTRAVENOUS at 21:15

## 2020-09-24 RX ADMIN — SODIUM CHLORIDE, SODIUM LACTATE, POTASSIUM CHLORIDE, AND CALCIUM CHLORIDE: 600; 310; 30; 20 INJECTION, SOLUTION INTRAVENOUS at 21:34

## 2020-09-24 RX ADMIN — SODIUM CHLORIDE, SODIUM LACTATE, POTASSIUM CHLORIDE, AND CALCIUM CHLORIDE 100 ML/HR: 600; 310; 30; 20 INJECTION, SOLUTION INTRAVENOUS at 20:10

## 2020-09-24 RX ADMIN — METOPROLOL TARTRATE 2.5 MG: 5 INJECTION, SOLUTION INTRAVENOUS at 22:55

## 2020-09-24 RX ADMIN — DEXAMETHASONE SODIUM PHOSPHATE 4 MG: 4 INJECTION, SOLUTION INTRAMUSCULAR; INTRAVENOUS at 21:20

## 2020-09-24 RX ADMIN — SUCCINYLCHOLINE CHLORIDE 100 MG: 20 INJECTION, SOLUTION INTRAMUSCULAR; INTRAVENOUS; PARENTERAL at 21:11

## 2020-09-24 RX ADMIN — HYDROMORPHONE HYDROCHLORIDE 0.2 MG: 1 INJECTION, SOLUTION INTRAMUSCULAR; INTRAVENOUS; SUBCUTANEOUS at 19:06

## 2020-09-24 RX ADMIN — FENTANYL CITRATE 25 MCG: 50 INJECTION, SOLUTION INTRAMUSCULAR; INTRAVENOUS at 22:55

## 2020-09-24 RX ADMIN — ROCURONIUM BROMIDE 5 MG: 10 INJECTION INTRAVENOUS at 21:09

## 2020-09-24 RX ADMIN — Medication 10 MG: at 21:26

## 2020-09-24 NOTE — ED TRIAGE NOTES
Pt arrives via EMS. Sent from [facility] [facility] for unwitnessed fall. Per EMS,staff entered room and found patient on floor with hematoma to L lower leg.  PMS in tact on arrival.

## 2020-09-24 NOTE — ED NOTES
Pt yelling sporadically. Per EMS pt at baseline. Pt has hx of \"unspecified dementia\" per accompanying medical record.

## 2020-09-24 NOTE — PROGRESS NOTES
Admission Medication Reconciliation:     Information obtained from:   Transfer paperwork from [facility] at [facility]     Comments/Recommendations:   Patient able to confirm name, , allergies, and preferred pharmacy  Updated PTA medication list    Prescriptions Last Dose Informant Taking?   acetaminophen (TYLENOL) 650 mg TbER  Transfer Papers Yes   Sig: Take 650 mg by mouth every eight (8) hours. busPIRone (BUSPAR) 10 mg tablet  Transfer Papers Yes   Sig: Take 10 mg by mouth two (2) times a day. escitalopram oxalate (Lexapro) 10 mg tablet  Transfer Papers Yes   Sig: Take 10 mg by mouth daily. ferrous sulfate 325 mg (65 mg iron) tablet  Transfer Papers Yes   Sig: Take 325 mg by mouth daily. ketotifen (Zaditor) 0.025 % (0.035 %) ophthalmic solution  Transfer Papers Yes   Sig: Administer 1 Drop to both eyes two (2) times a day. Indications: allergic conjunctivitis   polyethylene glycol (Miralax) 17 gram/dose powder  Transfer Papers Yes   Sig: Take 17 g by mouth daily. Facility-Administered Medications: None         Please contact the main inpatient pharmacy with any questions or concerns at [phone] and we will direct you to the clinical pharmacist covering this patient's care while in-house.    Ta Valentin, PharmD, BCPS

## 2020-09-24 NOTE — PROGRESS NOTES
9/24/2020  4:19 PM  CM completed assessment w/ pt's cousin Solange Holland who is her POA. Pt has resided at [facility] 1/2020. Per [facility] she was ambulatory w/ rollator and receiving PT, STEPHANIE Varela stated pt was WC bound and non-ambulatory at baseline. Reason for Admission:   L Tibia and Fibula fracutre  Pt to [facility] ED c/o L LE pain following unwitnessed GLF at her SNF                RUR Score:    11% Low Risk              Plan for utilizing home health:  No history, pt was previously receiving PT at SNF  Rx: Medicaid, all Rx filled at SNF   DME: rollator  PCP: First and Last name: Lito Perez MD   Name of Practice: [facility]   Are you a current patient: Yes/No: Yes   Approximate date of last visit: 30 days   Can you participate in a virtual visit with your PCP: NO                    Current Advanced Directive/Advance Care Plan: pt does not have ACP on File, courivera Holland (c) [phone] is POA                         Transition of Care Plan:     1. Hospital admission for surgical management, ortho consult  2. CM to follow through for treatment/response  3. ACP planning, determine if pt has valid AMD  4. Pt would benefit from PT/OT evals to determine skilled needs  5. D/C when stable to [facility]  6. Outpatient f/u PCP, ortho  7. Pt will require stretcher at d/c  Care Management Interventions  PCP Verified by CM:  Yes  Palliative Care Criteria Met (RRAT>21 & CHF Dx)?: No  Mode of Transport at Discharge: BLS(pt will need stretcher transport at d/c)  Physical Therapy Consult: No  Occupational Therapy Consult: No  Current Support Network: Nursing Facility(pt resides at [facility] where she receives support  for all ADLs)  Confirm Follow Up Transport: Other (see comment)(pt requires  Willian Parada for follow up outside of SNF)  Discharge Location  Discharge Placement: Skilled nursing facility([facility])  SABRINA Castillo

## 2020-09-24 NOTE — CONSULTS
ORTHO CONSULT    Subjective:     Date of Consultation:  September 24, 2020    Referring Physician:  Dr. Hector Sotomayor is a 79 y.o. female we are consulted to see for Closed L non displaced distal tibia and proximal fibula fracture with unknown injury at [facility] where she resides. She is non ambulatory, WC bound but with severe dementia she is non compliant and trying to walk at the facility. Patient Active Problem List    Diagnosis Date Noted    Confusion 01/02/2020    Weakness 01/02/2020    Weak 12/19/2019    Hard of hearing 12/19/2019    Dementia ([address] 12/19/2019    Debility 12/19/2019    HTN (hypertension) 12/16/2019    Hypokalemia due to loss of potassium 12/16/2019    Hydronephrosis of right kidney 12/15/2019    Bilateral kidney stones 12/15/2019     Family History   Problem Relation Age of Onset    Cancer Father       Social History     Tobacco Use    Smoking status: Never Smoker    Smokeless tobacco: Never Used   Substance Use Topics    Alcohol use: No     Past Medical History:   Diagnosis Date    Arthritis     Chronic kidney disease     kidney stones    Chronic pain     right hip    Hypertension     Ill-defined condition     chronic diarrhea      Past Surgical History:   Procedure Laterality Date    HX APPENDECTOMY      HX ORTHOPAEDIC      bilateral carpal tunnel    HX TONSILLECTOMY        Prior to Admission medications    Medication Sig Start Date End Date Taking? Authorizing Provider   acetaminophen (TYLENOL) 650 mg TbER Take 650 mg by mouth every eight (8) hours. Yes Provider, Historical   busPIRone (BUSPAR) 10 mg tablet Take 10 mg by mouth two (2) times a day. Yes Provider, Historical   ferrous sulfate 325 mg (65 mg iron) tablet Take 325 mg by mouth daily. Yes Provider, Historical   escitalopram oxalate (Lexapro) 10 mg tablet Take 10 mg by mouth daily.    Yes Provider, Historical   polyethylene glycol (Miralax) 17 gram/dose powder Take 17 g by mouth daily. Yes Provider, Historical   ketotifen (Zaditor) 0.025 % (0.035 %) ophthalmic solution Administer 1 Drop to both eyes two (2) times a day. Indications: allergic conjunctivitis   Yes Provider, Historical   ferrous sulfate 325 mg (65 mg iron) tablet Take 1 Tab by mouth daily (with breakfast). 1/7/20 9/24/20  Brielle Hinton MD     No current facility-administered medications for this encounter. Current Outpatient Medications   Medication Sig    acetaminophen (TYLENOL) 650 mg TbER Take 650 mg by mouth every eight (8) hours.  busPIRone (BUSPAR) 10 mg tablet Take 10 mg by mouth two (2) times a day.  ferrous sulfate 325 mg (65 mg iron) tablet Take 325 mg by mouth daily.  escitalopram oxalate (Lexapro) 10 mg tablet Take 10 mg by mouth daily.  polyethylene glycol (Miralax) 17 gram/dose powder Take 17 g by mouth daily.  ketotifen (Zaditor) 0.025 % (0.035 %) ophthalmic solution Administer 1 Drop to both eyes two (2) times a day. Indications: allergic conjunctivitis     Allergies   Allergen Reactions    Egg Unknown (comments)     Patient stated she is allergic. Unable to describe reaction type.  Pcn [Penicillins] Itching    Sulfa (Sulfonamide Antibiotics) Itching        Review of Systems:  A comprehensive review of systems was negative except for that written in the HPI. Objective:     Visit Vitals  BP (!) 130/90 (BP 1 Location: Right arm, BP Patient Position: At rest)   Pulse 74   Temp 98.2 °F (36.8 °C)   Resp 19   Ht 5' 2\" (1.575 m)   Wt 79.4 kg (175 lb)   SpO2 95%   BMI 32.01 kg/m²       EXAM: I examined pt's LLE. Skin intact. Moderate edema of the distal TIbia region. No ecchymosis noted. DP pulses = BLE's. No hip pain with rotation of the hip. XR Results (most recent):  Results from Hospital Encounter encounter on 09/24/20   XR HIP LT W OR WO PELV 2-3 VWS    Narrative EXAM: XR HIP LT W OR WO PELV 2-3 VWS    INDICATION: fall; in pain; with dementia.  no other hx noted.    COMPARISON: 10/13/2019. FINDINGS: AP view of the pelvis and a frogleg lateral view of the left hip  demonstrate no fracture, dislocation or other acute abnormality. Degenerative  changes are seen in the hip joints bilaterally. Right ureteral stent projects in  satisfactory position. Impression IMPRESSION:   Bilateral hip osteoarthritis. No acute abnormality. EXAM: XR TIB/FIB LT     INDICATION: Status post fall with left leg pain.     COMPARISON: None.     FINDINGS: AP and lateral  views of the left tibia and fibula demonstrate a  spiral fracture of the distal tibial shaft with normal alignment. There is a  spiral fracture of the proximal fibula shaft with normal alignment.     IMPRESSION  IMPRESSION: Distal tibial and proximal fibular nondisplaced fractures. Assessment/Plan:     I placed short posterior LLE splint with stirrup splint. NVI distally after placement. Orders to apply knee immobilizer over proximal splint and to thigh to immobilize the knee. Dr. Isaak Johnson will speak with family regarding risk vs benefits of ORIF of the LLE. Concern for pt compliance of NWB if non op, also concern for skin integrity with 10 weeks cast.    Appreciate FP admission, medical management and medical clearance. Pt. Is cleared for surgery at this time. Plan for OR for tonight vs tomorrow . Equipment rep notified from [employer] as well as case posted in [address] for 9pm tonight per Dr. Isaak Johnson. Dr. Isaak Johnson agrees with plan. Thank you for allowing us to take part in this patients care.     Cristian Calderon PA-C  [facility]  [address], [place]    [facility]

## 2020-09-24 NOTE — ROUTINE PROCESS
TRANSFER - OUT REPORT: 
 
Verbal report given to Zofia Thao RN (name) on Michael Fix  being transferred to [address](unit) for routine progression of care Report consisted of patients Situation, Background, Assessment and  
Recommendations(SBAR). Information from the following report(s) SBAR, Kardex and ED Summary was reviewed with the receiving nurse. Lines:  
Peripheral IV 09/24/20 Right;Mid Forearm (Active) Site Assessment Clean, dry, & intact 09/24/20 1226 Phlebitis Assessment 0 09/24/20 1226 Infiltration Assessment 0 09/24/20 1226 Dressing Status Clean, dry, & intact 09/24/20 1226 Dressing Type Tape;Transparent 09/24/20 1226 Hub Color/Line Status Pink; Other (comment) 09/24/20 1226 Action Taken Blood drawn 09/24/20 1226 Opportunity for questions and clarification was provided. Patient transported with: 
 [employer]

## 2020-09-24 NOTE — ED PROVIDER NOTES
HPI     Pt is a 79 y.o. F with PMH of dementia here from [facility] after found on the floor by staff after a fall. Pt unable to give hx but yelling in pain which seems to be coming from her leg. Per NH staff, pt does get up and try to walk but is not supposed to and is supposed to use wheelchair at baseline. No other known complaints at this time.      Past Medical History:   Diagnosis Date    Arthritis     Chronic kidney disease     kidney stones    Chronic pain     right hip    Hypertension     Ill-defined condition     chronic diarrhea       Past Surgical History:   Procedure Laterality Date    HX APPENDECTOMY      HX ORTHOPAEDIC      bilateral carpal tunnel    HX TONSILLECTOMY           Family History:   Problem Relation Age of Onset    Cancer Father        Social History     Socioeconomic History    Marital status:      Spouse name: Not on file    Number of children: Not on file    Years of education: Not on file    Highest education level: Not on file   Occupational History    Not on file   Social Needs    Financial resource strain: Not on file    Food insecurity     Worry: Not on file     Inability: Not on file    Transportation needs     Medical: Not on file     Non-medical: Not on file   Tobacco Use    Smoking status: Never Smoker    Smokeless tobacco: Never Used   Substance and Sexual Activity    Alcohol use: No    Drug use: No    Sexual activity: Not on file   Lifestyle    Physical activity     Days per week: Not on file     Minutes per session: Not on file    Stress: Not on file   Relationships    Social connections     Talks on phone: Not on file     Gets together: Not on file     Attends Muslim service: Not on file     Active member of club or organization: Not on file     Attends meetings of clubs or organizations: Not on file     Relationship status: Not on file    Intimate partner violence     Fear of current or ex partner: Not on file     Emotionally abused: Not on file     Physically abused: Not on file     Forced sexual activity: Not on file   Other Topics Concern    Not on file   Social History Narrative    Not on file         ALLERGIES: Egg; Pcn [penicillins]; and Sulfa (sulfonamide antibiotics)    Review of Systems   Unable to perform ROS: Dementia       Vitals:    09/24/20 1051   BP: (!) 158/46   Pulse: 67   Resp: 17   Temp: 98.2 °F (36.8 °C)   SpO2: 97%   Weight: 79.4 kg (175 lb)   Height: 5' 2\" (1.575 m)            Physical Exam  Vitals signs and nursing note reviewed. Constitutional:       General: She is not in acute distress. Appearance: She is well-developed. She is not diaphoretic. HENT:      Head: Normocephalic and atraumatic. Eyes:      Pupils: Pupils are equal, round, and reactive to light. Neck:      Musculoskeletal: Normal range of motion. Cardiovascular:      Rate and Rhythm: Normal rate. Pulmonary:      Effort: Pulmonary effort is normal.   Abdominal:      General: There is no distension. Musculoskeletal:         General: Swelling, tenderness and signs of injury present. Left lower leg: She exhibits tenderness, bony tenderness and swelling. Neurological:      Mental Status: She is alert. Mental status is at baseline. MDM       Procedures     CONSULT NOTE:  2:30 PM Santos Holstein, MD spoke with russell lomax, Consult for Orthopedics. Discussed available diagnostic tests and clinical findings. Dr. Aashish Valdez leg splint and admit for surgery. [facility] Consult for Admission  2:39 PM to [facility]    ED Room Number: ER10/10  Patient Name and age:  Deanna Vora 79 y.o.  female  Working Diagnosis:   1.  Closed fracture of left tibia and fibula, initial encounter        COVID-19 Suspicion:  no  Sepsis present:  no  Reassessment needed: no  Code Status:  Full Code  Readmission: no  Isolation Requirements:  no  Recommended Level of Care:  med/surg  Department:[facility] ED - [phone]  Other:  Pt with dementia fell today fracturing her left leg. Currently in short leg OCL. Ortho would like to do surgery. Pain controlled with fentanyl. She is from [place] [address]. Family practice to come down 2:46 PM  Accepted by fam med resident.      Mark Medel MD

## 2020-09-24 NOTE — PROGRESS NOTES
9/24/2020   1:55 PM  CM spoke w/ Ethan Lopez [phone] DON at [facility], she confirmed pt is WC bound and non-ambulatory  at baseline, they are able to manage pt NWB at LTC facility. Will follow and assist.  SABRINA Salas       1:02 PM  CM spoke w/ [facility], confirmed pt has resided at [address] [place] [address] since January 2020, her family contact is a cousin Irlanda Geller  [phone] who is POA. CM discussed possible d/c back to LTC, they confirmed [facility] is able to manage pt NWB. Will follow and assist.  SABRINA Salas        12:24 PM  CM asked to assist in d/c planning for this pt sent to [facility] following GLF at [facility] where she lives. Ortho has evaluated pt, she has L tibial fracture which will be treated conservatively, pt will be NWB  CM placed TC to admissions and [facility], lvm.   Await response  SABRINA Salas

## 2020-09-24 NOTE — H&P
[facility] [address] [place] [address]   Office [phone]  Fax [phone]       Admission H&P     Name: Levi Prasad MRN: [id number]  Sex: Female   YOB: 1940  Age: 79 y.o. PCP: Di Castro MD     Source of Information: patient, medical records    Chief complaint: Left Leg pain    History of Present Illness  Levi Prasad is a 79 y.o. female with PMH bilateral nephrolithiasis, hypokalemia, debility, dementia, and HTN who presents to the ER complaining of: Left Leg Pain. Patient came in from [facility] after nursing reports swelling and edema after ground level fall from wheel chair w/ concern for leg fracture. Per nursing patient is wheel chair bound. Patient only verbal once cousin arrived but was only oriented to self. Did not initially recognize cousin at first. Does not follow commands well. Patient unable to provide HPI or ROS d/t dementia. Per [facility], patient at baseline. Cousin reports not having seen or been able to communicate with patient since March d/t COVID. Of note: Patient has h/o multiple falls in past while walking. Last ED visit for a fall in 2019, at that time living at home with part time care taker and using [employer]. Since then has been transition to and living in [facility] since 2020. Also, Patient has advanced directive listing POA as Juan Pablo Gonzalez () and then Roberth Yi as POA in case of incapacity. Discussed code status with FLACO Yi who wants patient to remain full code.       In the ER:   Patient Vitals for the past 8 hrs:   Temp Pulse Resp BP SpO2   20 1445  80 19  97 %   20 1415  80 14  98 %   20 1330  71 27 119/65 99 %   20 1300  62 20 (!) 132/33 94 %   20 1245  67 14 (!) 137/38 95 %   20 1230  74 17  96 %   20 1229  74 19 (!) 130/90 95 %   20 1130  71 18 (!) 145/98 97 %   20 1115  66 19 (!) 164/54 97 %   20 1100  72 15 Keith Clause ) 142/47 96 %   09/24/20 1051 98.2 °F (36.8 °C) 67 17 (!) 158/46 97 %        Vitals: see above  Labs: WBC 16.9  Imaging:   · XR Hip - No acute process  · XR Left Tib/Fib - spiral fracture of the proximal fibula and-spiral fracture of the distal tibia, both not  significantly displaced. Treatment: s/p fentanyl    EKG: not performed    Review of Systems  Unable to assess d/t patient being non verbal    Home Medications   Prior to Admission medications    Medication Sig Start Date End Date Taking? Authorizing Provider   acetaminophen (TYLENOL) 650 mg TbER Take 650 mg by mouth every eight (8) hours. Yes Provider, Historical   busPIRone (BUSPAR) 10 mg tablet Take 10 mg by mouth two (2) times a day. Yes Provider, Historical   ferrous sulfate 325 mg (65 mg iron) tablet Take 325 mg by mouth daily. Yes Provider, Historical   escitalopram oxalate (Lexapro) 10 mg tablet Take 10 mg by mouth daily. Yes Provider, Historical   polyethylene glycol (Miralax) 17 gram/dose powder Take 17 g by mouth daily. Yes Provider, Historical   ketotifen (Zaditor) 0.025 % (0.035 %) ophthalmic solution Administer 1 Drop to both eyes two (2) times a day. Indications: allergic conjunctivitis   Yes Provider, Historical   ferrous sulfate 325 mg (65 mg iron) tablet Take 1 Tab by mouth daily (with breakfast). 1/7/20 9/24/20  Camila Guerra MD       Allergies  Allergies   Allergen Reactions    Egg Unknown (comments)     Patient stated she is allergic. Unable to describe reaction type.      Pcn [Penicillins] Itching    Sulfa (Sulfonamide Antibiotics) Itching       Past Medical History  Past Medical History:   Diagnosis Date    Arthritis     Chronic kidney disease     kidney stones    Chronic pain     right hip    Hypertension     Ill-defined condition     chronic diarrhea       Previous Hospitalization(s)  Past Surgical History:   Procedure Laterality Date    HX APPENDECTOMY      HX ORTHOPAEDIC      bilateral carpal tunnel    HX TONSILLECTOMY         Family History  Family History   Problem Relation Age of Onset    Cancer Father        Social History  Social History     Socioeconomic History    Marital status:      Spouse name: Not on file    Number of children: Not on file    Years of education: Not on file    Highest education level: Not on file   Occupational History    Not on file   Social Needs    Financial resource strain: Not on file    Food insecurity     Worry: Not on file     Inability: Not on file    Transportation needs     Medical: Not on file     Non-medical: Not on file   Tobacco Use    Smoking status: Never Smoker    Smokeless tobacco: Never Used   Substance and Sexual Activity    Alcohol use: No    Drug use: No    Sexual activity: Not on file   Lifestyle    Physical activity     Days per week: Not on file     Minutes per session: Not on file    Stress: Not on file   Relationships    Social connections     Talks on phone: Not on file     Gets together: Not on file     Attends Evangelical service: Not on file     Active member of club or organization: Not on file     Attends meetings of clubs or organizations: Not on file     Relationship status: Not on file    Intimate partner violence     Fear of current or ex partner: Not on file     Emotionally abused: Not on file     Physically abused: Not on file     Forced sexual activity: Not on file   Other Topics Concern    Not on file   Social History Narrative    Not on file       Living arrangement: patient lives with their family. Ambulates: Wheel Chair bound    Vital Signs  Visit Vitals  /65   Pulse 80   Temp 98.2 °F (36.8 °C)   Resp 19   Ht 5' 2\" (1.575 m)   Wt 175 lb (79.4 kg)   SpO2 97%   BMI 32.01 kg/m²       Physical Exam  General: No acute distress. Alert. Non Verbal.   Head: Normocephalic. Atraumatic. Eyes:              Conjunctiva pink. Sclera white. PERRL. Neck: Supple. Normal ROM. No stiffness. Respiratory: CTAB. No w/r/r/c. Cardiovascular: Irregular w/ regular rate. Normal S1,S2. No m/r/g. Pulses 2+ throughout. GI: + bowel sounds. Nontender. No rebound tenderness or guarding. Nondistended. Extremities: No LE edema. Left leg in splint. Skin: Warm, dry. No rashes. Neuro: CN II-XII grossly intact. No focal deficits. Laboratory Data  Recent Results (from the past 8 hour(s))   CBC WITH AUTOMATED DIFF    Collection Time: 09/24/20 12:32 PM   Result Value Ref Range    WBC 16.9 (H) 3.6 - 11.0 K/uL    RBC 4.30 3.80 - 5.20 M/uL    HGB 13.3 11.5 - 16.0 g/dL    HCT 40.8 35.0 - 47.0 %    MCV 94.9 80.0 - 99.0 FL    MCH 30.9 26.0 - 34.0 PG    MCHC 32.6 30.0 - 36.5 g/dL    RDW 13.5 11.5 - 14.5 %    PLATELET 422 850 - 046 K/uL    MPV 10.3 8.9 - 12.9 FL    NRBC 0.0 0  WBC    ABSOLUTE NRBC 0.00 0.00 - 0.01 K/uL    NEUTROPHILS 83 (H) 32 - 75 %    LYMPHOCYTES 9 (L) 12 - 49 %    MONOCYTES 6 5 - 13 %    EOSINOPHILS 0 0 - 7 %    BASOPHILS 1 0 - 1 %    IMMATURE GRANULOCYTES 1 (H) 0.0 - 0.5 %    ABS. NEUTROPHILS 14.0 (H) 1.8 - 8.0 K/UL    ABS. LYMPHOCYTES 1.5 0.8 - 3.5 K/UL    ABS. MONOCYTES 1.0 0.0 - 1.0 K/UL    ABS. EOSINOPHILS 0.1 0.0 - 0.4 K/UL    ABS. BASOPHILS 0.1 0.0 - 0.1 K/UL    ABS. IMM.  GRANS. 0.1 (H) 0.00 - 0.04 K/UL    DF AUTOMATED     METABOLIC PANEL, BASIC    Collection Time: 09/24/20 12:32 PM   Result Value Ref Range    Sodium 142 136 - 145 mmol/L    Potassium 4.6 3.5 - 5.1 mmol/L    Chloride 111 (H) 97 - 108 mmol/L    CO2 28 21 - 32 mmol/L    Anion gap 3 (L) 5 - 15 mmol/L    Glucose 111 (H) 65 - 100 mg/dL    BUN 18 6 - 20 MG/DL    Creatinine 0.60 0.55 - 1.02 MG/DL    BUN/Creatinine ratio 30 (H) 12 - 20      GFR est AA >60 >60 ml/min/1.73m2    GFR est non-AA >60 >60 ml/min/1.73m2    Calcium 8.7 8.5 - 10.1 MG/DL   PROTHROMBIN TIME + INR    Collection Time: 09/24/20 12:32 PM   Result Value Ref Range    INR 1.0 0.9 - 1.1      Prothrombin time 10.3 9.0 - 11.1 sec   SAMPLES BEING HELD    Collection Time: 09/24/20 12:32 PM Result Value Ref Range    SAMPLES BEING HELD 1RED,1SST     COMMENT        Add-on orders for these samples will be processed based on acceptable specimen integrity and analyte stability, which may vary by analyte. Imaging  CXR Results  (Last 48 hours)    None        CT Results  (Last 48 hours)    None            Assessment and Plan     Jane Santillan is a 79 y.o. female with PMH of bilateral nephrolithiasis, hypokalemia, debility, dementia, and HTN who is admitted for Tib/Fib Fracture. Closed nondisplaced Proximal Tibia and Fibula Fracture: Ground level fall from wheel chair this AM w/o head trauma per [address] [place]. No focal deficits. - Ortho consulted, apprec recs             - LLE splinted             - Given dementia/risk of weight bearing surgery tonight if medically stable   - Pain management for ortho                           Irregular heart rate: Normal rate. - EKG, Mg, Phos  - Trop  - Daily BMP    Leukocytosis: POA WBC 16.9. 2/4 SIRS likely 2/2 Tib/Fib fracture. Pt Afebrile. HDS. - UA  - CXR  - LA  - Daily CBC    Anemia: POA Hgb 13.3.  - Cont home Ferrous Sulfate 325 mg daily    Hypertension: POA /46. No home meds. Depression/Anxiety:  - Cont home meds Lexapro 10 mg daily and buspar 10 mg BID    Constipation:  - Cont Miralax    Dementia: No official diagnosis. No home meds. - Neuropsych testing outpt    Hard of Hearing: According to family responds to writing but has not seen patient since March of this year given COVID. FEN/GI - NPO. SLIV. Can transition to Mechanical Soft w/ thin liquids when appropriate. Activity - Bed rest, complete  DVT prophylaxis - SCDs  GI prophylaxis - Not indicated at this time  Fall prophylaxis - Fall precautions ordered. Disposition - Admit to Remote Telemetry. Plan to d/c to Nursing Home. Consulting PT/OT/CM  Code Status - Full, discussed with patient / caregivers.   Next of Kin Name and Contact Relative, Mary Dorantesauvais [phone]      Patient Deanna Reasoner will be discussed with Dr. Pepe Castano.     3:52 PM, 09/24/20  Mickey King MD  Family Medicine Resident       For Billing    Chief Complaint   Patient presents with   [facility] [place] Problems  Date Reviewed: 12/19/2019          Codes Class Noted POA    Fracture of tibia and fibula ICD-10-CM: S82.209A, S82.409A  ICD-9-CM: 823.82  9/24/2020 Unknown

## 2020-09-25 PROBLEM — I49.8 SINUS ARRHYTHMIA: Status: ACTIVE | Noted: 2020-09-25

## 2020-09-25 PROBLEM — N30.01 ACUTE CYSTITIS WITH HEMATURIA: Status: ACTIVE | Noted: 2020-09-25

## 2020-09-25 PROBLEM — I10 HTN (HYPERTENSION): Chronic | Status: RESOLVED | Noted: 2019-12-16 | Resolved: 2020-09-25

## 2020-09-25 PROBLEM — Z86.79 HISTORY OF HYPERTENSION: Status: ACTIVE | Noted: 2020-09-25

## 2020-09-25 LAB
ANION GAP SERPL CALC-SCNC: 7 MMOL/L (ref 5–15)
APPEARANCE UR: ABNORMAL
ATRIAL RATE: 82 BPM
BACTERIA URNS QL MICRO: ABNORMAL /HPF
BASOPHILS # BLD: 0 K/UL (ref 0–0.1)
BASOPHILS NFR BLD: 0 % (ref 0–1)
BILIRUB UR QL: NEGATIVE
BUN SERPL-MCNC: 14 MG/DL (ref 6–20)
BUN/CREAT SERPL: 21 (ref 12–20)
CALCIUM SERPL-MCNC: 8.2 MG/DL (ref 8.5–10.1)
CALCULATED P AXIS, ECG09: 79 DEGREES
CALCULATED R AXIS, ECG10: 58 DEGREES
CALCULATED T AXIS, ECG11: 28 DEGREES
CHLORIDE SERPL-SCNC: 108 MMOL/L (ref 97–108)
CO2 SERPL-SCNC: 25 MMOL/L (ref 21–32)
COLOR UR: ABNORMAL
CREAT SERPL-MCNC: 0.67 MG/DL (ref 0.55–1.02)
DIAGNOSIS, 93000: NORMAL
DIFFERENTIAL METHOD BLD: ABNORMAL
EOSINOPHIL # BLD: 0 K/UL (ref 0–0.4)
EOSINOPHIL NFR BLD: 0 % (ref 0–7)
EPITH CASTS URNS QL MICRO: ABNORMAL /LPF
ERYTHROCYTE [DISTWIDTH] IN BLOOD BY AUTOMATED COUNT: 13.4 % (ref 11.5–14.5)
GLUCOSE SERPL-MCNC: 169 MG/DL (ref 65–100)
GLUCOSE UR STRIP.AUTO-MCNC: NEGATIVE MG/DL
HCT VFR BLD AUTO: 35.5 % (ref 35–47)
HGB BLD-MCNC: 11.6 G/DL (ref 11.5–16)
HGB UR QL STRIP: ABNORMAL
IMM GRANULOCYTES # BLD AUTO: 0.2 K/UL (ref 0–0.04)
IMM GRANULOCYTES NFR BLD AUTO: 1 % (ref 0–0.5)
KETONES UR QL STRIP.AUTO: NEGATIVE MG/DL
LEUKOCYTE ESTERASE UR QL STRIP.AUTO: ABNORMAL
LYMPHOCYTES # BLD: 0.8 K/UL (ref 0.8–3.5)
LYMPHOCYTES NFR BLD: 4 % (ref 12–49)
MCH RBC QN AUTO: 31.1 PG (ref 26–34)
MCHC RBC AUTO-ENTMCNC: 32.7 G/DL (ref 30–36.5)
MCV RBC AUTO: 95.2 FL (ref 80–99)
MONOCYTES # BLD: 0.8 K/UL (ref 0–1)
MONOCYTES NFR BLD: 4 % (ref 5–13)
NEUTS SEG # BLD: 17.5 K/UL (ref 1.8–8)
NEUTS SEG NFR BLD: 91 % (ref 32–75)
NITRITE UR QL STRIP.AUTO: POSITIVE
NRBC # BLD: 0 K/UL (ref 0–0.01)
NRBC BLD-RTO: 0 PER 100 WBC
P-R INTERVAL, ECG05: 108 MS
PH UR STRIP: 6 [PH] (ref 5–8)
PLATELET # BLD AUTO: 254 K/UL (ref 150–400)
PMV BLD AUTO: 10.2 FL (ref 8.9–12.9)
POTASSIUM SERPL-SCNC: 4.7 MMOL/L (ref 3.5–5.1)
PROT UR STRIP-MCNC: 30 MG/DL
Q-T INTERVAL, ECG07: 382 MS
QRS DURATION, ECG06: 94 MS
QTC CALCULATION (BEZET), ECG08: 446 MS
RBC # BLD AUTO: 3.73 M/UL (ref 3.8–5.2)
RBC #/AREA URNS HPF: ABNORMAL /HPF (ref 0–5)
RBC MORPH BLD: ABNORMAL
SODIUM SERPL-SCNC: 140 MMOL/L (ref 136–145)
SP GR UR REFRACTOMETRY: 1.01 (ref 1–1.03)
UA: UC IF INDICATED,UAUC: ABNORMAL
UROBILINOGEN UR QL STRIP.AUTO: 0.2 EU/DL (ref 0.2–1)
VENTRICULAR RATE, ECG03: 82 BPM
WBC # BLD AUTO: 19.3 K/UL (ref 3.6–11)
WBC URNS QL MICRO: ABNORMAL /HPF (ref 0–4)

## 2020-09-25 PROCEDURE — 77010033678 HC OXYGEN DAILY

## 2020-09-25 PROCEDURE — 74011250636 HC RX REV CODE- 250/636: Performed by: STUDENT IN AN ORGANIZED HEALTH CARE EDUCATION/TRAINING PROGRAM

## 2020-09-25 PROCEDURE — 87077 CULTURE AEROBIC IDENTIFY: CPT

## 2020-09-25 PROCEDURE — 65660000000 HC RM CCU STEPDOWN

## 2020-09-25 PROCEDURE — 74011000258 HC RX REV CODE- 258: Performed by: STUDENT IN AN ORGANIZED HEALTH CARE EDUCATION/TRAINING PROGRAM

## 2020-09-25 PROCEDURE — 81001 URINALYSIS AUTO W/SCOPE: CPT

## 2020-09-25 PROCEDURE — 87635 SARS-COV-2 COVID-19 AMP PRB: CPT

## 2020-09-25 PROCEDURE — 99223 1ST HOSP IP/OBS HIGH 75: CPT | Performed by: FAMILY MEDICINE

## 2020-09-25 PROCEDURE — 80048 BASIC METABOLIC PNL TOTAL CA: CPT

## 2020-09-25 PROCEDURE — 87186 SC STD MICRODIL/AGAR DIL: CPT

## 2020-09-25 PROCEDURE — 94760 N-INVAS EAR/PLS OXIMETRY 1: CPT

## 2020-09-25 PROCEDURE — 36415 COLL VENOUS BLD VENIPUNCTURE: CPT

## 2020-09-25 PROCEDURE — 87040 BLOOD CULTURE FOR BACTERIA: CPT

## 2020-09-25 PROCEDURE — 74011000250 HC RX REV CODE- 250: Performed by: ORTHOPAEDIC SURGERY

## 2020-09-25 PROCEDURE — 74011250636 HC RX REV CODE- 250/636: Performed by: ANESTHESIOLOGY

## 2020-09-25 PROCEDURE — 74011250636 HC RX REV CODE- 250/636: Performed by: ORTHOPAEDIC SURGERY

## 2020-09-25 PROCEDURE — 87086 URINE CULTURE/COLONY COUNT: CPT

## 2020-09-25 PROCEDURE — 85025 COMPLETE CBC W/AUTO DIFF WBC: CPT

## 2020-09-25 RX ORDER — LANOLIN ALCOHOL/MO/W.PET/CERES
3 CREAM (GRAM) TOPICAL
Status: DISCONTINUED | OUTPATIENT
Start: 2020-09-25 | End: 2020-09-28 | Stop reason: HOSPADM

## 2020-09-25 RX ORDER — OXYCODONE HYDROCHLORIDE 5 MG/1
5 TABLET ORAL
Status: DISCONTINUED | OUTPATIENT
Start: 2020-09-25 | End: 2020-09-28 | Stop reason: HOSPADM

## 2020-09-25 RX ORDER — NALOXONE HYDROCHLORIDE 0.4 MG/ML
0.4 INJECTION, SOLUTION INTRAMUSCULAR; INTRAVENOUS; SUBCUTANEOUS AS NEEDED
Status: DISCONTINUED | OUTPATIENT
Start: 2020-09-25 | End: 2020-09-28 | Stop reason: HOSPADM

## 2020-09-25 RX ORDER — ENOXAPARIN SODIUM 100 MG/ML
40 INJECTION SUBCUTANEOUS EVERY 24 HOURS
Status: DISCONTINUED | OUTPATIENT
Start: 2020-09-25 | End: 2020-09-28 | Stop reason: HOSPADM

## 2020-09-25 RX ORDER — ONDANSETRON 2 MG/ML
4 INJECTION INTRAMUSCULAR; INTRAVENOUS
Status: DISCONTINUED | OUTPATIENT
Start: 2020-09-25 | End: 2020-09-28 | Stop reason: HOSPADM

## 2020-09-25 RX ADMIN — HYDROMORPHONE HYDROCHLORIDE 0.2 MG: 1 INJECTION, SOLUTION INTRAMUSCULAR; INTRAVENOUS; SUBCUTANEOUS at 04:09

## 2020-09-25 RX ADMIN — Medication 10 ML: at 01:09

## 2020-09-25 RX ADMIN — CEFAZOLIN SODIUM 2 G: 1 INJECTION, POWDER, FOR SOLUTION INTRAMUSCULAR; INTRAVENOUS at 12:57

## 2020-09-25 RX ADMIN — ENOXAPARIN SODIUM 40 MG: 40 INJECTION SUBCUTANEOUS at 09:27

## 2020-09-25 RX ADMIN — CEFTRIAXONE SODIUM 1 G: 1 INJECTION, POWDER, FOR SOLUTION INTRAMUSCULAR; INTRAVENOUS at 02:57

## 2020-09-25 RX ADMIN — HYDROMORPHONE HYDROCHLORIDE 0.2 MG: 1 INJECTION, SOLUTION INTRAMUSCULAR; INTRAVENOUS; SUBCUTANEOUS at 09:27

## 2020-09-25 RX ADMIN — Medication 10 ML: at 20:48

## 2020-09-25 RX ADMIN — CEFAZOLIN SODIUM 2 G: 1 INJECTION, POWDER, FOR SOLUTION INTRAMUSCULAR; INTRAVENOUS at 03:52

## 2020-09-25 RX ADMIN — Medication 10 ML: at 14:34

## 2020-09-25 RX ADMIN — CEFAZOLIN SODIUM 2 G: 1 INJECTION, POWDER, FOR SOLUTION INTRAMUSCULAR; INTRAVENOUS at 20:40

## 2020-09-25 RX ADMIN — HYDROMORPHONE HYDROCHLORIDE 0.25 MG: 1 INJECTION, SOLUTION INTRAMUSCULAR; INTRAVENOUS; SUBCUTANEOUS at 00:10

## 2020-09-25 RX ADMIN — Medication 10 ML: at 12:57

## 2020-09-25 RX ADMIN — HYDROMORPHONE HYDROCHLORIDE 0.2 MG: 1 INJECTION, SOLUTION INTRAMUSCULAR; INTRAVENOUS; SUBCUTANEOUS at 20:40

## 2020-09-25 RX ADMIN — Medication 10 ML: at 05:00

## 2020-09-25 RX ADMIN — HYDROMORPHONE HYDROCHLORIDE 0.2 MG: 1 INJECTION, SOLUTION INTRAMUSCULAR; INTRAVENOUS; SUBCUTANEOUS at 14:33

## 2020-09-25 NOTE — PERIOP NOTES
TRANSFER - OUT REPORT:    Verbal report given to Amarjit RN(name) on Lurline Gottron  being transferred to [facility]   (unit) for routine post - op       Report consisted of patients Situation, Background, Assessment and   Recommendations(SBAR). Information from the following report(s) SBAR, Kardex, Intake/Output, MAR, Recent Results and Cardiac Rhythm sinus arrythmia was reviewed with the receiving nurse. Lines:   Peripheral IV 09/24/20 Right;Mid Forearm (Active)   Site Assessment Clean, dry, & intact 09/25/20 0015   Phlebitis Assessment 0 09/25/20 0015   Infiltration Assessment 0 09/25/20 0015   Dressing Status Clean, dry, & intact 09/25/20 0015   Dressing Type Transparent 09/25/20 0015   Hub Color/Line Status Pink 09/25/20 0015   Action Taken Open ports on tubing capped 09/25/20 0015   Alcohol Cap Used Yes 09/25/20 0015        Opportunity for questions and clarification was provided.       Patient transported with: RN X2   Monitor  O2 @ 2 liters

## 2020-09-25 NOTE — PROGRESS NOTES
[address] Medicine Residency [facility] with [facility]       Resident Progress Note in Brief    S: Patient s/p left tibia repair for tibia/fibula fracture. She is satting in the 80s but pulling off oxygen cannula and refusing replacement. She is not short of breath. Otherwise doing well. O:  Visit Vitals  /60 (BP 1 Location: Right leg, BP Patient Position: At rest)   Pulse 88   Temp 97.3 °F (36.3 °C)   Resp 20   Ht 5' 2\" (1.575 m)   Wt 175 lb (79.4 kg)   SpO2 93%   BMI 32.01 kg/m²       Physical Examination:   General appearance - well appearing, and in no distress  Chest - clear to auscultation, no wheezes, rales or rhonchi, symmetric air entry  Heart - regularly irregular rhythm, no murmurs, rubs, clicks or gallops  Abdomen - soft, nontender, nondistended, no masses or organomegaly  Neurological - nonverbal (at baseline), eyes open. Extremities - L leg in boot. Capilary refill normal <2 seconds. A/P:   Omero Stanley is a 79 y.o. female with PMH of bilateral nephrolithiasis, hypokalemia, debility, dementia, and HTN who is admitted for Tib/Fib Fracture.     S/p repair of closed nondisplaced proximal tibia and fibula fracture O2 sats 84%, refusing oxygen cannula. Hypoxia likely multifactorial, 2/2 anesthesia, pain, and opioids.   - restraint order: mittens (to allow for cannula to remain in place)  - supplemental O2 to keep O2 >88%  - dilaudid 0.2mg q4prn and roxicodone 5mg q4 prn post op  - will closely monitor vital signs      Jack Johnson MD  Family Medicine Resident

## 2020-09-25 NOTE — PROGRESS NOTES
1920 Received report from [facility]. Patient noted to be yelling and screaming very loudly. Bilateral hand mittens intact. Dayshift Nurse stated that patient would not take any meds or eat any food. Will try to give patient scheduled meds and prn pain med.

## 2020-09-25 NOTE — PROGRESS NOTES
3:07 PM  09/25/20     Case Management     Transition of Care Plan:     1. Hospital admission for surgical management, ortho consult  2. CM to follow through for treatment/response  3. ACP planning, determine if pt has valid AMD  4. Pt would benefit from PT/OT evals to determine skilled needs  5. D/C when stable to [facility]  6. Outpatient f/u PCP, ortho  7. Pt will require stretcher at d/c    Attending physician is aware that the patient will need 2 negative COVIDs before she can be transferred back to [facility]. [facility] will accept patient back upon discharge. The patient was agitated this afternoon and confused.        Puneet Fountain RN CCM

## 2020-09-25 NOTE — PROGRESS NOTES
Bedside and Verbal shift change report given to Turner (oncoming nurse) by Eugenie Jimenes (offgoing nurse). Report included the following information SBAR.

## 2020-09-25 NOTE — PROGRESS NOTES
Occupational Therapy    Acknowledge OT orders and completed chart review. Attempted to see patient for OT eval.  Per nursing patient is agitated, confused and not following commands. She is inappropriate for OT services at this time. Will continue to follow for OT eval as appropriate.      Thank you,    Dudley Faulkner OTR/L

## 2020-09-25 NOTE — PROGRESS NOTES
Orthopaedic Progress Note  Post Op day: 1 Day Post-Op    2020 11:53 AM     Patient: Michael Pompa MRN: [id number]  SSN: [id number]    YOB: 1940  Age: 79 y.o. Sex: female      Admit date:  2020  Date of Surgery:  2020   Procedures:  Procedure(s):  LEFT TIBIA INTRA MEDULLARY NAIL (ANES CHOICE)  Admitting Physician:  Baron Panda MD   Surgeon:  Liu Nelson) and Role:     Arthur León MD - Primary    Consulting Physician(s): Treatment Team: Attending Provider: Sam Mcmillan MD; Consulting Provider: Candido Isabel; Care Manager: Akosua Dickerson; Surgeon: Amadou Beard MD; Consulting Provider: Jaylene Bowie MD; Utilization Review: Belinda Moreno    SUBJECTIVE:     Michael Pompa is a 79 y.o. female is 1 Day Post-Op s/p Procedure(s):  LEFT TIBIA INTRA MEDULLARY NAIL (ANES CHOICE) laying in bed sleeping. She is arousable but unable to answer questions. No complaints of nausea, vomiting, dizziness, lightheadedness, chest pain, or shortness of breath. OBJECTIVE:       Physical Exam:  General: Alert, cooperative, no distress. Respiratory: Respirations unlabored  Neurological:  Neurovascular exam within normal limits. Motor: + DF/PF. Musculoskeletal: Calves soft, supple, non-tender upon palpation. Dressing/Wound:  Clean, dry and intact. No significant erythema or swelling.       Vital Signs:        Patient Vitals for the past 8 hrs:   BP Temp Pulse Resp SpO2   20 1048 (!) 140/56 98.6 °F (37 °C) 69 18 94 %   20 0707 117/67 97.7 °F (36.5 °C) 78 18 96 %   20 0701   83                                            Temp (24hrs), Av.8 °F (36.6 °C), Min:97.2 °F (36.2 °C), Max:98.6 °F (37 °C)      Labs:        Recent Labs     20  0359 20  1232   HCT 35.5 40.8   HGB 11.6 13.3   INR  --  1.0     Lab Results   Component Value Date/Time    Sodium 140 2020 03:58 AM    Potassium 4.7 09/25/2020 03:58 AM    Chloride 108 09/25/2020 03:58 AM    CO2 25 09/25/2020 03:58 AM    Glucose 169 (H) 09/25/2020 03:58 AM    BUN 14 09/25/2020 03:58 AM    Creatinine 0.67 09/25/2020 03:58 AM    Calcium 8.2 (L) 09/25/2020 03:58 AM       PT/OT:                Patient mobility                         ASSESSMENT / PLAN:   Principal Problem:    Fracture of tibia and fibula (9/24/2020)    Active Problems:    Leukocytosis (12/15/2019)      Hard of hearing (12/19/2019)      Dementia ([facility] [address] (12/19/2019)      Confusion (1/2/2020)      Anxiety (9/24/2020)      Acute cystitis with hematuria (9/25/2020)      Sinus arrhythmia (9/25/2020)      History of hypertension (9/25/2020)         A: 1. Left tibial nail, suprapatellar approach POD 1  2. Dementia    P: 1. NWB LLE. Transfers only if possible. 2. Knee ROM as tolerated NWB. 3. DVT ppx: Lovenox 40 mg SC daily. SCD RLE  4. Hypoxia- FP monitoring. They believe multifactorial- ? A fib, anesthesia (?atelectasis), medication induced  5. DISPO: SNF when medically appropriate  6. Leukocytosis- likely reactive. Monitor. Work up fevers as needed. Likely atelectasis/surgery. UA with leukocyte esterase/bacteria UC pending. 7.Followup with Dr. Moreno Savage in 2-3 weeks.       Signed By:  Anderson Luna, [address] [facility] [address]

## 2020-09-25 NOTE — PROGRESS NOTES
Physical Therapy    Orders acknowledged, chart reviewed. PT evaluation deferred at this time per discussion with RN due to pt agitated, confused, not following commands, unable to participate at this time. Will continue to follow.     Judson Urbina, PT, MPT

## 2020-09-25 NOTE — ANESTHESIA PREPROCEDURE EVALUATION
Relevant Problems   No relevant active problems     Relevant Problems   No relevant active problems       Anesthetic History   No history of anesthetic complications            Review of Systems / Medical History  Patient summary reviewed, nursing notes reviewed and pertinent labs reviewed    Pulmonary  Within defined limits                 Neuro/Psych   Within defined limits           Cardiovascular    Hypertension        Dysrhythmias : atrial fibrillation           GI/Hepatic/Renal         Renal disease: stones      Comments: HYDRONEPHROSIS Endo/Other        Arthritis     Other Findings              Physical Exam    Airway  Mallampati: III  TM Distance: 4 - 6 cm  Neck ROM: normal range of motion   Mouth opening: Normal     Cardiovascular    Rhythm: irregular  Rate: normal         Dental  No notable dental hx       Pulmonary  Breath sounds clear to auscultation               Abdominal         Other Findings            Anesthetic Plan    ASA: 3, emergent  Anesthesia type: general          Induction: Intravenous  Anesthetic plan and risks discussed with: Patient and Healthcare power of       SPOKE WITH COUSIN WHO HAS POA    Relevant Problems   No relevant active problems       Anesthetic History   No history of anesthetic complications            Review of Systems / Medical History  Patient summary reviewed, nursing notes reviewed and pertinent labs reviewed    Pulmonary  Within defined limits                 Neuro/Psych   Within defined limits           Cardiovascular    Hypertension        Dysrhythmias : atrial fibrillation           GI/Hepatic/Renal         Renal disease: stones      Comments: HYDRONEPHROSIS Endo/Other        Arthritis     Other Findings              Physical Exam    Airway  Mallampati: III  TM Distance: 4 - 6 cm  Neck ROM: normal range of motion   Mouth opening: Normal     Cardiovascular    Rhythm: irregular  Rate: normal         Dental  No notable dental hx       Pulmonary  Breath sounds clear to auscultation               Abdominal         Other Findings            Anesthetic Plan    ASA: 3, emergent  Anesthesia type: general          Induction: Intravenous  Anesthetic plan and risks discussed with: Patient and Healthcare power of       SPOKE WITH COUSIN WHO HAS POA        Anesthetic History   No history of anesthetic complications            Review of Systems / Medical History  Patient summary reviewed, nursing notes reviewed and pertinent labs reviewed    Pulmonary  Within defined limits                 Neuro/Psych         Dementia     Cardiovascular    Hypertension: well controlled                Comments: ECG shows sinus rhythm with PVCs.    GI/Hepatic/Renal  Within defined limits              Endo/Other        Arthritis     Other Findings   Comments: Left Tibia Fracture         Physical Exam    Airway  Mallampati: I  TM Distance: 4 - 6 cm  Neck ROM: normal range of motion   Mouth opening: Normal     Cardiovascular    Rhythm: regular  Rate: normal         Dental  No notable dental hx       Pulmonary  Breath sounds clear to auscultation               Abdominal  GI exam deferred       Other Findings            Anesthetic Plan    ASA: 3  Anesthesia type: general          Induction: Intravenous  Anesthetic plan and risks discussed with: Healthcare power of  and Family

## 2020-09-25 NOTE — CONSULTS
ORTHO CONSULT    Subjective:     Date of Consultation:  September 24, 2020    Referring Physician:  Dr. Sean Dominguez is a 79 y.o. female we are consulted to see for Closed L non displaced distal tibia and proximal fibula fracture with likely GLF out of wheel chair at [facility] where she resides. She is minimally ambulatory at baseline. Does use a wheelchair most the time but per cousin is up and walking for short distances with a rolling walker. Does have severe dementia and is noncompliant. Patient is nonverbal    Following histories were obtained from the electronic medical record patient is non-verbal and no further history was able to be obtained      Patient Active Problem List    Diagnosis Date Noted    Fracture of tibia and fibula 09/24/2020    Anxiety 09/24/2020    Confusion 01/02/2020    Hard of hearing 12/19/2019    Dementia ([facility] [address] 12/19/2019    Debility 12/19/2019    HTN (hypertension) 12/16/2019    Hypokalemia due to loss of potassium 12/16/2019    Leukocytosis 12/15/2019    Hydronephrosis of right kidney 12/15/2019    Bilateral kidney stones 12/15/2019     Family History   Problem Relation Age of Onset    Cancer Father       Social History     Tobacco Use    Smoking status: Never Smoker    Smokeless tobacco: Never Used   Substance Use Topics    Alcohol use: No     Past Medical History:   Diagnosis Date    Arthritis     Chronic kidney disease     kidney stones    Chronic pain     right hip    Hypertension     Ill-defined condition     chronic diarrhea    Weak 12/19/2019    Weakness 1/2/2020      Past Surgical History:   Procedure Laterality Date    HX APPENDECTOMY      HX ORTHOPAEDIC      bilateral carpal tunnel    HX TONSILLECTOMY        Prior to Admission medications    Medication Sig Start Date End Date Taking? Authorizing Provider   acetaminophen (TYLENOL) 650 mg TbER Take 650 mg by mouth every eight (8) hours.    Yes Provider, Historical   busPIRone (BUSPAR) 10 mg tablet Take 10 mg by mouth two (2) times a day. Yes Provider, Historical   ferrous sulfate 325 mg (65 mg iron) tablet Take 325 mg by mouth daily. Yes Provider, Historical   escitalopram oxalate (Lexapro) 10 mg tablet Take 10 mg by mouth daily. Yes Provider, Historical   polyethylene glycol (Miralax) 17 gram/dose powder Take 17 g by mouth daily. Yes Provider, Historical   ketotifen (Zaditor) 0.025 % (0.035 %) ophthalmic solution Administer 1 Drop to both eyes two (2) times a day. Indications: allergic conjunctivitis   Yes Provider, Historical     Current Facility-Administered Medications   Medication Dose Route Frequency    lidocaine (PF) (XYLOCAINE) 10 mg/mL (1 %) injection 0.1 mL  0.1 mL SubCUTAneous PRN    lactated Ringers infusion  100 mL/hr IntraVENous CONTINUOUS    ceFAZolin (ANCEF) 2 g in sterile water (preservative free) 20 mL IV syringe  2 g IntraVENous ON CALL TO OR    HYDROmorphone (DILAUDID) syringe 0.2 mg  0.2 mg IntraVENous Q4H PRN    sodium chloride (NS) flush 5-40 mL  5-40 mL IntraVENous Q8H    sodium chloride (NS) flush 5-40 mL  5-40 mL IntraVENous PRN    [START ON 9/25/2020] busPIRone (BUSPAR) tablet 10 mg  10 mg Oral BID    [START ON 9/25/2020] escitalopram oxalate (LEXAPRO) tablet 10 mg  10 mg Oral DAILY    [START ON 9/25/2020] ferrous sulfate tablet 325 mg  325 mg Oral DAILY    [START ON 9/25/2020] ketotifen (ZADITOR) 0.025 % (0.035 %) ophthalmic solution 1 Drop  1 Drop Both Eyes BID PRN    [START ON 9/25/2020] polyethylene glycol (MIRALAX) packet 17 g  17 g Oral DAILY     Allergies   Allergen Reactions    Egg Unknown (comments)     Patient stated she is allergic. Unable to describe reaction type.      Pcn [Penicillins] Itching    Sulfa (Sulfonamide Antibiotics) Itching        Review of Systems: Unable to obtain secondary to mental status    Objective:     Visit Vitals  BP (!) 166/33   Pulse 90   Temp 98.3 °F (36.8 °C)   Resp 20   Ht 5' 2\" (1.575 m)   Wt 79.4 kg (175 lb)   SpO2 96%   BMI 32.01 kg/m²       Gen: Well-developed,  in no acute distress at rest but will scream out with any manipulation or examination. ,  Mildly alert sometimes would follow slight commands of squeezing fingers but otherwise no other communication or responses  HEENT: NCAT, chronic changes to eyes and eye alignment  CARDS: Regular rate and slightly irregular rhythm  RESP: Nonlaborred breathing, no use of accessory muscles  Abdomen: NT ND soft, no peritoneal signs  SKIN: Intact     Left lower extremity: Patient is in a knee immobilizer and short leg splint has brisk capillary refill to the toes will not follow formal commands for wiggling the toes. Nontender palpation of the thigh and hip range of motion of the hip deferred secondary to pain in the leg. Does appear to have some sensation of the toes but no focal neuro exam is able be performed. Right lower extremity: Has chronic changes to the foot and contracture of the ankle but minimal exam secondary to patient's compliance no palpable crepitus no skin breakdown or signs of trauma with swelling or ecchymosis but minimal tolerance of passive or active motion of the hip knee ankle or toes. Has brisk capillary refill. Formal sensory exam is unable to be performed. Bilateral upper extremities: No gross deformities. Skin is intact no ecchymosis or swelling no crepitus is appreciated on palpation range of motion of the shoulders elbows wrist and hands is without obvious pain. Will minimally squeeze her fingers but formal motor exam is unable to perform. Sensory appears to be intact in all dermatomes but unable to have formal exam.  Brisk capillary refill and 2+ radial pulses.       XR Results (most recent):  Results from Hospital Encounter encounter on 09/24/20   XR CHEST PORT    Narrative Clinical history: leukocytosis  INDICATION:   leukocytosis  COMPARISON: 12/19/2019    FINDINGS:  AP portable upright view of the chest demonstrates a stable  cardiopericardial  silhouette. There is no pleural effusion, consolidation, or visualized  pneumothorax. Patient is on a cardiac monitor. Small effusions have resolved. Impression IMPRESSION:  No acute process identified. Florecita Mendiola EXAM: XR TIB/FIB LT     INDICATION: Status post fall with left leg pain.     COMPARISON: None.     FINDINGS: AP and lateral  views of the left tibia and fibula demonstrate a  spiral fracture of the distal tibial shaft with normal alignment. There is a  spiral fracture of the proximal fibula shaft with normal alignment.     IMPRESSION  IMPRESSION: Distal tibial and proximal fibular nondisplaced fractures. Independent review of x-rays and CT scan of the left lower leg demonstrates a distal third tibia fracture with spiral morphology that extends down to the articular segment with a nondisplaced fracture of the medial malleolus as well as a sagittal fracture of the weightbearing surface of the ankle joint. .  There is also an associated proximal fibula fracture that is nondisplaced. Overall osteopenia of the bones. Assessment/Plan:     23-year-old demented nonverbal female who is minimally ambulatory but noncompliant with restrictions and impulsive status post unwitnessed ground-level fall with left nondisplaced spiral distal third tibia fracture with associated proximal fibula fracture with involvement of the articular segment. Admit to medicine, appreciate help in medical management and clearance for surgery  -Keep n.p.o. for surgery  -Had long conversation with patient's cousin who is her power of  regarding management of the left lower leg. We discussed nonoperative versus operative management. We discussed continuing to keep her nonweightbearing in a long-leg splint and then transition to a cast for 6 weeks and then transitioning to a short leg cast for additional 6 weeks.   Patient's cousin expressed concerns about compliance with nonweightbearing as well as risk of falls with the cast.  Also concerned about skin breakdown. Otherwise discussed the option for surgical intervention with left leg intramedullary nailing for stabilization allowing for earlier weightbearing. With the articular segment will have to keep off but will help prevent inadvertent displacement with internal stabilization. We discussed the risks and benefits, which include, but are not limited to, bleeding requiring blood transfusion and risks associated to transfusion, infection, neurovascular damage, wound complications, non-union/malunion, hardware failure, painful hardware, ankle arthritis, DVT/PE, and need for further surgeries. She understands all this and has consented for her cousin to move forward surgery    -Hold anticoagulation  -We will start anticoagulation postop  -PT/OT for transfers  -Disposition: Skilled nurse facility versus back to [facility]  -Follow-up: 2 weeks Teodora Hobson with [facility] call [phone] for scheduling    Britni Darnell MD  Orthopaedic surgeon  [address] [place] [address].  Evaline Merlin, MD  [facility] [phone]  Medical Staff : Alessia Curry/ [address]  Office : [phone]

## 2020-09-25 NOTE — BRIEF OP NOTE
OPERATIVE REPORT    Admit Date: 9/24/2020  Admit Diagnosis: Fracture of tibia and fibula [S82.209A, S82.409A]    Date of Procedure: 9/24/2020   Preoperative Diagnosis: LEFT DISTAL TIBIAL SHAFT  FRACTURE, LEFT PROXIMAL FIBULA FRACTURE, LEFT DISTAL ARTICULAR TIBIA FRACTURE  Postoperative Diagnosis: LEFT DISTAL TIBIAL SHAFT  FRACTURE, LEFT PROXIMAL FIBULA FRACTURE, LEFT DISTAL ARTICULAR TIBIA FRACTURE  Procedure:   1. LEFT TIBIA INTRA MEDULLARY NAIL (ANES CHOICE)  2. FIXATION DISTAL TIBIA ARTICULAR FRACTURE    Surgeon: Derrick Serra MD  Assistant(s): [facility] surgical assistant, Meggan Ferro  Anesthesia: General   Estimated Blood Loss: 200cc  Specimens: * No specimens in log *   Complications: None      INDICATIONS:  Morenita Meza is a patient with closed left distal tibia fracture with proximal fibula fracture as well as intra-articular extension. Patient is minimally ambulatory with advanced dementia but with patient's dementia and inability to comply with nonweightbearing of the extremity and concern for skin breakdown with long leg immobilization for prolonged period of time patient's family and power of  elected to move forward with surgery for left tibia intramedullary nailing. The risks of surgery include but are not limited to complications with anesthesia including death, pain, bleeding, infection, damage to surrounding structures, numbness and tingling, wound healing problems, recurrence of symptoms, incomplete relief of pain, nonunion, malunion, hardware failure, painful hardware, the need for further surgery, DVT, and PE. The patient verbalized understanding and elected to proceed with surgical intervention. The patient was seen for medical clearance. PROCEDURE PERFORMED :   The patient was seen in the pre-operative holding area and the proper limb initialed. Questions were answered and the patient was taken to the operating room for anesthesia.  They were positioned on the table and the operative extremity was prepped and draped in a sterile fashion. The appropriate antibiotics were given and time-out was performed prior to the incision. We first addressed the distal articular fracture. Using fluoroscopy we localized the start point for a a to P screw in the medial distal tibia medial to the tibialis anterior tendon. We incised the skin and then dissected down to the bone we then drilled and placed a 3.5 mm cortical screw with a washer secondary patient's bone quality obtaining good bite we then turned our attention to an additional articular fixation with a medial to lateral screw placed distal to the physis just distal to our other screw we again localized this with fluoroscopy and then incised the skin and dissected down to the bone bluntly we then placed a 4.0 cancellus screw. Once we had adequate fixation that was confirmed on fluoroscopy without displacement of our fractures at the articular segment turned our attention to fixation of the tibia shaft. We elected to use extension nailing so the start point was localized with fluoroscopy and then a 5 cm incision was made proximal to the patella dissecting down through the subcutaneous tissue to the extensor mechanism and quad tendon once this was isolated we then sharply transected the quad tendon in line with the fibers and then bluntly dissected into the suprapatellar pouch and behind the patella we then attempted to place our trochlear trocar but there was still not enough space in the trochlea so we extended our incision distally and took our incision via a small medial parapatellar incision once this was done we then placed our padded trocar in the appropriate position confirmed on fluoroscopy and then placed a guidewire  The guide-wire was passed and over reamed with the appropriate starting reamer.      The fracture was then confirmed to be adequately reduced and then we passed our guidewire confirming appropriate position distally in the distal metaphysis. the canal was reamed to the point of cortical chatter. The guide-wire was measured and the appropriate diameter and length nail was selected. The nail was placed over the guide-wire and past the fracture site. Subsequently the guide-wire was removed and final seating of the nail performed. Fluoroscopy was used to verify satisfactory reduction and two distal interlock screws were placed followed by two proximal interlock screws after backslapping to ensure compression in her fracture site. Final views were obtained to verify the reduction and screw / nail placement. The ankle was then stressed under fluoro and noted to be stable no fixation of the fibula was felt to be necessary    The suprapatellar wound was copiously irrigated with normal saline and closed in layers with #1 strata fix 2-0 Vicryl and 3-0 in the dermis with Dermabond on the skin. . The incisions for the interlock screws were closed with 2-0 Vicryl and 3-0 Monocryl and Dermabond. A sterile dressing and patient was placed into a removable cam boot for wound checks. the patient then recovered from anesthesia and was taken to the post-operative holding area in a stable condition. IMPLANTS :     Synthes nail 11 x 285 MM, 2 proximal and 2 distal interlocks, one 3.5 mm rectal screw and one 4.0 mm cancellus screw    Implant Name Type Inv.  Item Serial No.  Lot No. LRB No. Used Action   SCREW BNE L35MM DIA4MM CANC S STL ST TARYN NONLOCKING FULL - SNA  SCREW BNE L35MM DIA4MM CANC S STL ST TARYN NONLOCKING FULL NA [employer] NA Left 1 Implanted   SCREW BNE L36MM DIA3.5MM SAMM S STL ST NONCANNULATED PRITI - SNA  SCREW BNE L36MM DIA3.5MM SAMM S STL ST NONCANNULATED PRITI NA [employer] NA Left 1 Implanted   NAIL IM [id number] [id number] UNIV TIB LT GRN TI TARYN PRITI FLUT - SNA  NAIL IM [id number] [id number] UNIV TIB LT GRN TI TARYN PRITI FLUT NA [employer] [id number] Left 1 Implanted       Ramiro Poles Melinda Edmondson, [address] [facility] [phone]

## 2020-09-25 NOTE — PROGRESS NOTES
[address] [place] [address]   Office [phone]  Fax [phone]          Assessment and Plan     Yumiko Camargo is a 79 y.o. female with PMH of bilateral nephrolithiasis, hypokalemia, debility, dementia, and HTN who is admitted for Tib/Fib Fracture.     24 Hour Events: Restraints placed d/t pt pulling IV lines and NC    Closed nondisplaced Proximal Tibia and Fibula Fracture: s/p left tibia repair tibia/fibula fx . GLF from wheel chair per [facility]. - Ortho following, f/u in 2 wks outpatient  - Enoxaparin 40 mg daily   - Dilaudid 0.2 mg iv q4h prn  - Oxycodone 5mg po q4h prn  - COVID test pending     Sepsis 2/2 UTI: POA WBC 16.9. LA nml. Afebrile, HDS. UA Bacteria 3+. - Ceftriaxone 1g IV daily (started )  - UCx pending  - Bcx  NG x 15h  - Daily CBC and CMP                        Irregular heart rate: Normal rate. EKG: NSR, short OR, occasional PVCs. - Monitor remote telemetry    Anemia: POA Hgb 13.3. BL Hgb   - Home Ferrous Sulfate 325 mg daily     Hypertension: POA /46. No home meds. - Monitor BP and adjust as needed     Depression/Anxiety  - Home Lexapro 10mg po daily and Buspar 10mg po BID     Constipation: 2/2 opiate use. - Miralax daily     Dementia: No official diagnosis. No home meds. - Neuropsych testing outpt     Hard of Hearing: According to family pt responds to written text.      FEN/GI - Regular diet. Activity - Bed rest, complete  DVT prophylaxis - SCDs  GI prophylaxis - Not indicated at this time  Fall prophylaxis - Fall precautions ordered. Disposition Plan to d/c to Nursing Home. Consulting PT/OT/CM  Code Status - Full, discussed with patient / caregivers. Next of Kin Name and Contact Relative, Lakhwinder Queen [address], DO  Family Medicine Resident         Subjective / Objective     Subjective Pt was asleep. Pt is nonverbal at baseline. She opened her eyes with touch and loud voice.       Temp (24hrs), Av.2 °F (36.8 °C), Min:97.5 °F (36.4 °C), Max:98.6 °F (37 °C)     Objective:  Vital signs: (most recent): Blood pressure (!) 140/56, pulse 69, temperature 98.6 °F (37 °C), resp. rate 18, height 5' 2\" (1.575 m), weight 175 lb (79.4 kg), SpO2 94 %. Respiratory: O2 Flow Rate (L/min): 2 l/min O2 Device: Nasal cannula   Visit Vitals  BP (!) 170/66 (BP 1 Location: Right leg, BP Patient Position: At rest)   Pulse 89   Temp 98.6 °F (37 °C)   Resp 20   Ht 5' 2\" (1.575 m)   Wt 175 lb (79.4 kg)   SpO2 96%   BMI 32.01 kg/m²     General: Screaming. No alert, oriented, verbally responsive. Restraints/mittens in place. Head: Normocephalic. Atraumatic. Respiratory: CTAB. No w/r/r/c.  Cardiovascular: Irregular. Normal S1,S2. No m/r/g.   GI: Nondistended.+ bowel sounds. Nontender. No rebound tenderness or guarding. Extremities: LLE with boot. RLE Distal pulse present. Skin: Warm, dry. No rashes. Neuro: Nonverbal at baseline. Responds to touch and loud voice.        I/O:  Date 09/25/20 0700 - 09/26/20 0659 09/26/20 0700 - 09/27/20 0659   Shift [id number] 1900-0659 24 Hour Total 6418-6555 7798-3802 24 Hour Total   INTAKE   Shift Total(mL/kg)         OUTPUT   Urine(mL/kg/hr)           Urine Occurrence(s) 1 x 1 x 2 x      Shift Total(mL/kg)         NET         Weight (kg) 79.4 79.4 79.4 79.4 79.4 79.4       CBC:  Recent Labs     09/25/20  0359 09/24/20  1232   WBC 19.3* 16.9*   HGB 11.6 13.3   HCT 35.5 40.8    083       Metabolic Panel:  Recent Labs     09/25/20  0358 09/24/20  1232    142   K 4.7 4.6    111*   CO2 25 28   BUN 14 18   CREA 0.67 0.60   * 111*   CA 8.2* 8.7   MG  --  2.2   PHOS  --  3.3   INR  --  1.0          For Billing    Chief Complaint   Patient presents with   [facility] [place] Problems  Date Reviewed: 12/19/2019          Codes Class Noted POA    Acute cystitis with hematuria ICD-10-CM: N30.01  ICD-9-CM: 595.0  9/25/2020 Unknown        Sinus arrhythmia ICD-10-CM: I49.8  ICD-9-CM: 427.89 9/25/2020 Unknown        History of hypertension ICD-10-CM: Z86.79  ICD-9-CM: V12.59  9/25/2020 Unknown        * (Principal) Fracture of tibia and fibula ICD-10-CM: S82.209A, S82.409A  ICD-9-CM: 823.82  9/24/2020 Unknown        Anxiety ICD-10-CM: F41.9  ICD-9-CM: 300.00  9/24/2020 Unknown        Confusion ICD-10-CM: R41.0  ICD-9-CM: 298.9  1/2/2020 Yes        Hard of hearing (Chronic) ICD-10-CM: H91.90  ICD-9-CM: 389.9  12/19/2019 Yes        Dementia ([address] (Chronic) ICD-10-CM: F03.90  ICD-9-CM: 294.20  12/19/2019 Yes        Leukocytosis ICD-10-CM: P11.256  ICD-9-CM: 288.60  12/15/2019 Unknown

## 2020-09-25 NOTE — PROGRESS NOTES
[address] [place] [address]   Office [phone]  Fax [phone]          Assessment and Plan     Clement Mustafa is a 79 y.o. female with PMH of bilateral nephrolithiasis, hypokalemia, debility, dementia, and HTN who is admitted for Tib/Fib Fracture.     24 Hour Events: Pt was refusing her NC, O2Sat: 80s. Mitt were placed on L hand momentarily. Afterwards patient rested comfortably. 92% with NC 2L. Closed nondisplaced Proximal Tibia and Fibula Fracture: s/p left tibia repair for tibia/fibula fracture. Ground level fall from wheel chair w/o head trauma per [address] [place]. No focal deficits. - Ortho recs. - Follow-up: 2 weeks [facility] [place] with Ann Better call [phone] for scheduling.  - Enoxaparin 40 mg daily   - Dilaudid 0.2 mg iv q4h prn  - Oxycodone 5mg po q4h prn  - COVID test pending. Irregular heart rate: Normal rate. Trop:0.05 M.2 Phos:3.3. EKG: Sinus rhythm with short UT with occasional premature ventricular complexes. - Daily BMP     Leukocytosis : POA WBC 16.9. 2/4 SIRS likely 2/2 to UTI. Pt Afebrile. HDS. UA (): Large blood, large LE, WBC: , Bacteria:3+. CXR: No acute process. LA: 1.0.  - Ceftriaxone 1g iv daily ()  - UCx pending  - Daily CBC     Anemia: POA Hgb 13.3.  - Cont home Ferrous Sulfate 325 mg daily.     Hypertension: POA /46. No home meds.       Depression/Anxiety:  - Cont home meds Lexapro 10 mg daily and buspar 10 mg BID     Constipation:  - Cont Miralax     Dementia: No official diagnosis. No home meds. - Neuropsych testing outpt     Hard of Hearing: According to family responds to writing but has not seen patient since March of this year given COVID.        FEN/GI - Regular diet. Activity - Bed rest, complete  DVT prophylaxis - SCDs  GI prophylaxis - Not indicated at this time  Fall prophylaxis - Fall precautions ordered. Disposition Plan to d/c to Nursing Home.  Consulting PT/OT/CM  Code Status - Full, discussed with patient / caregivers. Next of Kin Name and Contact Relative, Julai Calvillo [phone]      Shikha Arana MD  Family Medicine Resident         Subjective / Objective     Subjective Pt was asleep. Pt is nonverbal at baseline. She opened her eyes with touch and loud voice. Temp (24hrs), Av.8 °F (36.6 °C), Min:97.2 °F (36.2 °C), Max:98.6 °F (37 °C)     Objective  Respiratory: O2 Flow Rate (L/min): 2 l/min O2 Device: Nasal cannula(pt keeps removing)   Visit Vitals  /67 (BP 1 Location: Right leg, BP Patient Position: At rest)   Pulse 78   Temp 97.7 °F (36.5 °C)   Resp 18   Ht 5' 2\" (1.575 m)   Wt 175 lb (79.4 kg)   SpO2 96%   BMI 32.01 kg/m²     General: No acute distress. asleep. Head: Normocephalic. Atraumatic. Respiratory: CTAB. No w/r/r/c.  Cardiovascular: Irregular. Normal S1,S2. No m/r/g.   GI: Nondistended.+ bowel sounds. Nontender. No rebound tenderness or guarding. Extremities: LLE with boot. RLE Distal pulse present. Skin: Warm, dry. No rashes. Neuro: Nonverbal at baseline. Responds to touch and loud voice.        I/O:  Date 20 - 20 - 20   Shift [id number] 24 Hour Total 9164-5234 6815-7746 24 Hour Total   INTAKE   I.V.(mL/kg/hr)  7690(7.9) (1)        Volume (lactated Ringers infusion)  2000      Shift Total(mL/kg)  1772(66.3) 2133(88.1)      OUTPUT   Urine(mL/kg/hr)  400(0.4) 400(0.2)        Urine  400 400        Urine Occurrence(s)  1 x 1 x      Shift Total(mL/kg)  400(5) 400(5)      NET  1600 1600      Weight (kg) 79.4 79.4 79.4 79.4 79.4 79.4       CBC:  Recent Labs     20  0359 20  1232   WBC 19.3* 16.9*   HGB 11.6 13.3   HCT 35.5 40.8    356       Metabolic Panel:  Recent Labs     20  0358 20  1232    142   K 4.7 4.6    111*   CO2 25 28   BUN 14 18   CREA 0.67 0.60   * 111*   CA 8.2* 8.7   MG  --  2.2   PHOS  --  3.3   INR  --  1.0 For Billing    Chief Complaint   Patient presents with   [facility] [place] Problems  Date Reviewed: 12/19/2019          Codes Class Noted POA    Acute cystitis with hematuria ICD-10-CM: N30.01  ICD-9-CM: 595.0  9/25/2020 Unknown        Sinus arrhythmia ICD-10-CM: I49.8  ICD-9-CM: 427.89  9/25/2020 Unknown        * (Principal) Fracture of tibia and fibula ICD-10-CM: S82.209A, S82.409A  ICD-9-CM: 823.82  9/24/2020 Unknown        Anxiety ICD-10-CM: F41.9  ICD-9-CM: 300.00  9/24/2020 Unknown        Confusion ICD-10-CM: R41.0  ICD-9-CM: 298.9  1/2/2020 Yes        Hard of hearing (Chronic) ICD-10-CM: H91.90  ICD-9-CM: 389.9  12/19/2019 Yes        Dementia ([address] (Chronic) ICD-10-CM: F03.90  ICD-9-CM: 294.20  12/19/2019 Yes        HTN (hypertension) (Chronic) ICD-10-CM: I10  ICD-9-CM: 401.9  12/16/2019 Yes        Leukocytosis ICD-10-CM: W78.808  ICD-9-CM: 288.60  12/15/2019 Unknown

## 2020-09-25 NOTE — ANESTHESIA POSTPROCEDURE EVALUATION
Procedure(s):  LEFT TIBIA INTRA MEDULLARY NAIL (ANES CHOICE). general    Anesthesia Post Evaluation      Multimodal analgesia: multimodal analgesia not used between 6 hours prior to anesthesia start to PACU discharge  Patient location during evaluation: PACU  Patient participation: complete - patient participated  Level of consciousness: confused  Pain score: 2  Pain management: adequate  Airway patency: patent  Anesthetic complications: no  Cardiovascular status: acceptable  Respiratory status: acceptable  Hydration status: acceptable  Post anesthesia nausea and vomiting:  none  Final Post Anesthesia Temperature Assessment:  Normothermia (36.0-37.5 degrees C)      INITIAL Post-op Vital signs:   Vitals Value Taken Time   /84 9/24/2020 11:50 PM   Temp 36.2 °C (97.2 °F) 9/24/2020 11:36 PM   Pulse 76 9/24/2020 11:53 PM   Resp 17 9/24/2020 11:53 PM   SpO2 92 % 9/24/2020 11:53 PM   Vitals shown include unvalidated device data.

## 2020-09-25 NOTE — PERIOP NOTES
2034  Ancef order on chart and allergy to PCN noted on chart. Confirmed with Pharmacist Juanita Beal)  Pt has had Ancef IV in 2015.

## 2020-09-25 NOTE — PROGRESS NOTES
100: pt oxygen level is around 85 without her NC on; Pt is confused and will not leave oxygen on; notified nursing supervisor and filled out sitter tool. No sitter available at this time. Notified family practice and order placed for mittens. At this time pt is asleep with NC on; will continue to monitor and use mittens if needed. 115: orders for urinalysis. Reported to family practice pt is incontinent; verbal order to straight cath pt for urinalysis. 222: pt O2 remains at 87; placed mitt on pt left hand and she is not attempting to pull at [place].    345: pt is able to rest comfortably with NC on and is not pulling. Mitten removed; will continue to monitor pt.     744; Bedside shift change report given to Erlinda Abbott RN (oncoming nurse) by Mandy Duckworth RN (offgoing nurse). Report included the following information SBAR, Kardex, Procedure Summary, Intake/Output, Recent Results and Cardiac Rhythm sinus arrhythmia.

## 2020-09-26 LAB
ANION GAP SERPL CALC-SCNC: 9 MMOL/L (ref 5–15)
BASOPHILS # BLD: 0.1 K/UL (ref 0–0.1)
BASOPHILS NFR BLD: 1 % (ref 0–1)
BUN SERPL-MCNC: 15 MG/DL (ref 6–20)
BUN/CREAT SERPL: 29 (ref 12–20)
CALCIUM SERPL-MCNC: 8.4 MG/DL (ref 8.5–10.1)
CHLORIDE SERPL-SCNC: 109 MMOL/L (ref 97–108)
CO2 SERPL-SCNC: 26 MMOL/L (ref 21–32)
COVID-19, XGCOVT: NOT DETECTED
CREAT SERPL-MCNC: 0.52 MG/DL (ref 0.55–1.02)
DIFFERENTIAL METHOD BLD: ABNORMAL
EOSINOPHIL # BLD: 0.2 K/UL (ref 0–0.4)
EOSINOPHIL NFR BLD: 2 % (ref 0–7)
ERYTHROCYTE [DISTWIDTH] IN BLOOD BY AUTOMATED COUNT: 13.8 % (ref 11.5–14.5)
GLUCOSE SERPL-MCNC: 103 MG/DL (ref 65–100)
HCT VFR BLD AUTO: 35.2 % (ref 35–47)
HEALTH STATUS, XMCV2T: NORMAL
HEALTH STATUS, XMCV2T: NORMAL
HGB BLD-MCNC: 11.4 G/DL (ref 11.5–16)
IMM GRANULOCYTES # BLD AUTO: 0.1 K/UL (ref 0–0.04)
IMM GRANULOCYTES NFR BLD AUTO: 1 % (ref 0–0.5)
LYMPHOCYTES # BLD: 1.8 K/UL (ref 0.8–3.5)
LYMPHOCYTES NFR BLD: 12 % (ref 12–49)
MAGNESIUM SERPL-MCNC: 2.1 MG/DL (ref 1.6–2.4)
MCH RBC QN AUTO: 31.3 PG (ref 26–34)
MCHC RBC AUTO-ENTMCNC: 32.4 G/DL (ref 30–36.5)
MCV RBC AUTO: 96.7 FL (ref 80–99)
MONOCYTES # BLD: 1.6 K/UL (ref 0–1)
MONOCYTES NFR BLD: 11 % (ref 5–13)
NEUTS SEG # BLD: 11.3 K/UL (ref 1.8–8)
NEUTS SEG NFR BLD: 73 % (ref 32–75)
NRBC # BLD: 0 K/UL (ref 0–0.01)
NRBC BLD-RTO: 0 PER 100 WBC
PHOSPHATE SERPL-MCNC: 2.8 MG/DL (ref 2.6–4.7)
PLATELET # BLD AUTO: 240 K/UL (ref 150–400)
PMV BLD AUTO: 10.4 FL (ref 8.9–12.9)
POTASSIUM SERPL-SCNC: 4.1 MMOL/L (ref 3.5–5.1)
RBC # BLD AUTO: 3.64 M/UL (ref 3.8–5.2)
SARS-COV-2, COV2NT: NOT DETECTED
SODIUM SERPL-SCNC: 144 MMOL/L (ref 136–145)
SOURCE, COVRS: NORMAL
SOURCE, COVRS: NORMAL
SPECIMEN SOURCE, FCOV2M: NORMAL
SPECIMEN SOURCE, FCOV2M: NORMAL
SPECIMEN TYPE, XMCV1T: NORMAL
SPECIMEN TYPE, XMCV1T: NORMAL
TROPONIN I SERPL-MCNC: <0.05 NG/ML
WBC # BLD AUTO: 15.2 K/UL (ref 3.6–11)

## 2020-09-26 PROCEDURE — 74011250636 HC RX REV CODE- 250/636: Performed by: STUDENT IN AN ORGANIZED HEALTH CARE EDUCATION/TRAINING PROGRAM

## 2020-09-26 PROCEDURE — 74011000258 HC RX REV CODE- 258: Performed by: STUDENT IN AN ORGANIZED HEALTH CARE EDUCATION/TRAINING PROGRAM

## 2020-09-26 PROCEDURE — 77030038269 HC DRN EXT URIN PURWCK BARD -A

## 2020-09-26 PROCEDURE — 74011250637 HC RX REV CODE- 250/637: Performed by: STUDENT IN AN ORGANIZED HEALTH CARE EDUCATION/TRAINING PROGRAM

## 2020-09-26 PROCEDURE — 83735 ASSAY OF MAGNESIUM: CPT

## 2020-09-26 PROCEDURE — 97161 PT EVAL LOW COMPLEX 20 MIN: CPT

## 2020-09-26 PROCEDURE — 97530 THERAPEUTIC ACTIVITIES: CPT

## 2020-09-26 PROCEDURE — 77010033678 HC OXYGEN DAILY

## 2020-09-26 PROCEDURE — 84484 ASSAY OF TROPONIN QUANT: CPT

## 2020-09-26 PROCEDURE — 85025 COMPLETE CBC W/AUTO DIFF WBC: CPT

## 2020-09-26 PROCEDURE — 99232 SBSQ HOSP IP/OBS MODERATE 35: CPT | Performed by: FAMILY MEDICINE

## 2020-09-26 PROCEDURE — 65660000000 HC RM CCU STEPDOWN

## 2020-09-26 PROCEDURE — 94760 N-INVAS EAR/PLS OXIMETRY 1: CPT

## 2020-09-26 PROCEDURE — 97167 OT EVAL HIGH COMPLEX 60 MIN: CPT

## 2020-09-26 PROCEDURE — 74011000250 HC RX REV CODE- 250: Performed by: STUDENT IN AN ORGANIZED HEALTH CARE EDUCATION/TRAINING PROGRAM

## 2020-09-26 PROCEDURE — 84100 ASSAY OF PHOSPHORUS: CPT

## 2020-09-26 PROCEDURE — 74011250636 HC RX REV CODE- 250/636: Performed by: ORTHOPAEDIC SURGERY

## 2020-09-26 PROCEDURE — 80048 BASIC METABOLIC PNL TOTAL CA: CPT

## 2020-09-26 PROCEDURE — 36415 COLL VENOUS BLD VENIPUNCTURE: CPT

## 2020-09-26 RX ORDER — METOPROLOL TARTRATE 5 MG/5ML
5 INJECTION INTRAVENOUS ONCE
Status: DISCONTINUED | OUTPATIENT
Start: 2020-09-26 | End: 2020-09-26

## 2020-09-26 RX ORDER — LORAZEPAM 2 MG/ML
0.5 INJECTION INTRAMUSCULAR
Status: DISCONTINUED | OUTPATIENT
Start: 2020-09-26 | End: 2020-09-28 | Stop reason: HOSPADM

## 2020-09-26 RX ORDER — METOPROLOL TARTRATE 5 MG/5ML
5 INJECTION INTRAVENOUS
Status: ACTIVE | OUTPATIENT
Start: 2020-09-26 | End: 2020-09-27

## 2020-09-26 RX ORDER — LEVOFLOXACIN 5 MG/ML
750 INJECTION, SOLUTION INTRAVENOUS EVERY 24 HOURS
Status: DISCONTINUED | OUTPATIENT
Start: 2020-09-26 | End: 2020-09-27

## 2020-09-26 RX ORDER — SODIUM CHLORIDE 9 MG/ML
50 INJECTION, SOLUTION INTRAVENOUS CONTINUOUS
Status: DISCONTINUED | OUTPATIENT
Start: 2020-09-26 | End: 2020-09-28 | Stop reason: HOSPADM

## 2020-09-26 RX ORDER — METOPROLOL TARTRATE 5 MG/5ML
5 INJECTION INTRAVENOUS
Status: COMPLETED | OUTPATIENT
Start: 2020-09-26 | End: 2020-09-26

## 2020-09-26 RX ADMIN — Medication 10 ML: at 06:26

## 2020-09-26 RX ADMIN — HYDROMORPHONE HYDROCHLORIDE 0.2 MG: 1 INJECTION, SOLUTION INTRAMUSCULAR; INTRAVENOUS; SUBCUTANEOUS at 02:08

## 2020-09-26 RX ADMIN — LORAZEPAM 0.5 MG: 2 INJECTION INTRAMUSCULAR; INTRAVENOUS at 17:22

## 2020-09-26 RX ADMIN — SODIUM CHLORIDE 500 ML: 900 INJECTION, SOLUTION INTRAVENOUS at 14:10

## 2020-09-26 RX ADMIN — METOPROLOL TARTRATE 5 MG: 1 INJECTION, SOLUTION INTRAVENOUS at 11:29

## 2020-09-26 RX ADMIN — HYDROMORPHONE HYDROCHLORIDE 0.2 MG: 1 INJECTION, SOLUTION INTRAMUSCULAR; INTRAVENOUS; SUBCUTANEOUS at 13:37

## 2020-09-26 RX ADMIN — SODIUM CHLORIDE 100 ML/HR: 900 INJECTION, SOLUTION INTRAVENOUS at 17:24

## 2020-09-26 RX ADMIN — ESCITALOPRAM OXALATE 10 MG: 10 TABLET ORAL at 08:01

## 2020-09-26 RX ADMIN — FERROUS SULFATE TAB 325 MG (65 MG ELEMENTAL FE) 325 MG: 325 (65 FE) TAB at 08:01

## 2020-09-26 RX ADMIN — HYDROMORPHONE HYDROCHLORIDE 0.2 MG: 1 INJECTION, SOLUTION INTRAMUSCULAR; INTRAVENOUS; SUBCUTANEOUS at 20:47

## 2020-09-26 RX ADMIN — SODIUM CHLORIDE: 900 INJECTION, SOLUTION INTRAVENOUS at 21:30

## 2020-09-26 RX ADMIN — POLYETHYLENE GLYCOL 3350 17 G: 17 POWDER, FOR SOLUTION ORAL at 08:01

## 2020-09-26 RX ADMIN — ENOXAPARIN SODIUM 40 MG: 40 INJECTION SUBCUTANEOUS at 08:00

## 2020-09-26 RX ADMIN — HYDROMORPHONE HYDROCHLORIDE 0.2 MG: 1 INJECTION, SOLUTION INTRAMUSCULAR; INTRAVENOUS; SUBCUTANEOUS at 08:01

## 2020-09-26 RX ADMIN — AMIODARONE HYDROCHLORIDE 0.5 MG/MIN: 50 INJECTION, SOLUTION INTRAVENOUS at 22:55

## 2020-09-26 RX ADMIN — CEFTRIAXONE SODIUM 1 G: 1 INJECTION, POWDER, FOR SOLUTION INTRAMUSCULAR; INTRAVENOUS at 06:26

## 2020-09-26 RX ADMIN — LEVOFLOXACIN 750 MG: 5 INJECTION, SOLUTION INTRAVENOUS at 12:48

## 2020-09-26 RX ADMIN — AMIODARONE HYDROCHLORIDE 1 MG/MIN: 50 INJECTION, SOLUTION INTRAVENOUS at 16:09

## 2020-09-26 RX ADMIN — BUSPIRONE HYDROCHLORIDE 10 MG: 5 TABLET ORAL at 08:01

## 2020-09-26 RX ADMIN — AMIODARONE HYDROCHLORIDE 150 MG: 50 INJECTION, SOLUTION INTRAVENOUS at 15:56

## 2020-09-26 NOTE — ROUTINE PROCESS
Bedside shift change report given to Monika Akhtar (oncoming nurse) by Torin Sánchez (offgoing nurse). Report included the following information SBAR, Kardex, Intake/Output, MAR, Accordion, Recent Results and Med Rec Status.

## 2020-09-26 NOTE — DISCHARGE SUMMARY
[address] [facility] [address] [place] [address]   Office [phone]  Fax [phone]       Discharge / Transfer / Off-Service Note     Name: Gladys Jones MRN: [id number]  Sex: Female   YOB: 1940  Age: 79 y.o. PCP: Eb Castro MD     Date of admission: 9/24/2020  Date of discharge/transfer: 9/28/2020    Attending physician at admission: Alisa Arora MD     Attending physician at discharge/transfer: Dr. Kandice Ward. Resident physician at discharge/transfer: Michelle Bateman MD     Consultants during hospitalization  IP CONSULT  [place] CONSULT TO [facility]  IP CONSULT TO CARDIOLOGY     Admission diagnoses   Fracture of tibia and fibula [S82.209A, S82.409A]    Recommended follow-up after discharge  1. PCP: Tori Cervantes MD    Things to follow up on with PCP:  New Onset? Atrial Fibrillation. History of Present Illness  Per admitting provider, \"Beatriz Burciaga is a 79 y.o. female with PMH bilateral nephrolithiasis, hypokalemia, debility, dementia, and HTN who presents to the ER complaining of: Left Leg Pain. Patient came in from [facility] after nursing reports swelling and edema after ground level fall from wheel chair w/ concern for leg fracture. Per nursing patient is wheel chair bound. Patient only verbal once cousin arrived but was only oriented to self. Did not initially recognize cousin at first. Does not follow commands well. Patient unable to provide HPI or ROS d/t dementia. Per [facility], patient at baseline. Cousin reports not having seen or been able to communicate with patient since March d/t COVID.     Of note: Patient has h/o multiple falls in past while walking. Last ED visit for a fall in 12/2019, at that time living at home with part time care taker and using [employer].  Since then has been transition to and living in [facility] since February of 2020.     Also, Patient has advanced directive listing POA as Clearance [place] Lata Best () and then Carrillo Organ as POA in case of incapacity. Discussed code status with POA [place] Organ who wants patient to remain full code\". HOSPITAL COURSE    Sepsis 2/2 UTI: Ucx: enterococcus species and providencia stuartii. BCx: Neg 4 days. - Continue Cefdinir 300 bid x 4 days. - Continue Macrobid 100 mg bid x 4 days.  - Follow up with PCP.                       Closed nondisplaced Proximal Tibia and Fibula Fracture: s/p left tibia repair tibia/fibula fx .   - Ortho following, f/u in 10 days outpatient. - Tylenol 625 mg every 8 hrs prn for pain  - Percocet 5-325mg every 4 hrs prn for severe pain      New onset Afib w/ RVR: POA EKG: NSR, occasional PVCs. Found to have new onset Afib w/ RVR (20), s/p amiodarone gtt. - Follow up with Cardiology    - Continue Metoprolol 12.5 mg bid. - Continue Amiodarone 200 mg daily. Anemia: POA:13.3 H.4 at d/c. (Stable s/p surgery). - Continue home Ferrous Sulfate 325 mg day     Hypertension: No home meds.    - Recommended BP log and Follow up with PCP.     Depression/Anxiety. - Continue Home Lexapro 10 mg po daily and Buspar 10mg po BID.     Constipation: 2/2 opiate use. - Continue Miralax daily     Dementia: No official diagnosis. No home meds.     Hard of Hearing: According to family pt responds to written text. COVID test Negative x2     Physical exam at discharge:    Vitals Reviewed.   Visit Vitals  BP (!) 135/50 (BP 1 Location: Right arm, BP Patient Position: At rest)   Pulse 62   Temp 99.2 °F (37.3 °C)   Resp 21   Ht 5' 2\" (1.575 m)   Wt 137 lb 6.4 oz (62.3 kg)   SpO2 97%   BMI 25.13 kg/m²          Condition at discharge: Stable    Labs  Recent Labs     20  0701 20  0420 20  1100   WBC 9.2 11.1* 15.2*   HGB 9.4* 9.6* 11.4*   HCT 29.5* 30.7* 35.2    206 240     Recent Labs     20  0701 20  0420 20  1127    144 144   K 4.0 4.0 4.1   * 112* 109*   CO2 25 26 26   BUN 12 16 15   CREA 0.47* 0.54* 0.52*   GLU 93 95 103*   CA 8.2* 8.3* 8.4*   MG 1.8 1.9 2.1   PHOS 2.8 3.4 2.8     No results for input(s): ALT, AP, TBIL, TBILI, TP, ALB, GLOB, GGT, AML, LPSE in the last 72 hours. No lab exists for component: SGOT, GPT, AMYP, HLPSE  Recent Labs     09/26/20  1127   TROIQ <0.05       Microbiology  Results     Procedure Component Value Units Date/Time    CULTURE, BLOOD, PAIRED [[id number]] Collected:  09/25/20 1401    Order Status:  Completed Specimen:  Blood Updated:  09/28/20 0359     Special Requests: NO SPECIAL REQUESTS        Culture result: NO GROWTH 3 DAYS       CULTURE, URINE [[id number]]  (Abnormal)  (Susceptibility) Collected:  09/25/20 0122    Order Status:  Completed Specimen:  Urine Updated:  09/27/20 1049     Special Requests: --        NO SPECIAL REQUESTS  Reflexed from [id number]       [place] Count --        >100,000  COLONIES/mL       Culture result: ENTEROCOCCUS FAECALIS         PROVIDENCIA STUARTII       Susceptibility      Enterococcus faecalis     DARLENE     Ampicillin ($) Susceptible     Ciprofloxacin ($) Susceptible     Daptomycin ($$$$$) Susceptible     Levofloxacin ($) Susceptible     Linezolid ($$$$$) Susceptible     Nitrofurantoin Susceptible     Tetracycline Resistant     Vancomycin ($) Susceptible                Susceptibility      Providencia stuartii     DARLENE     Amikacin ($) Susceptible     Ampicillin ($) Resistant     Ampicillin/sulbactam ($) Intermediate     Cefazolin ($) Resistant     Cefepime ($$) Susceptible     Cefoxitin Susceptible     Ceftazidime ($) Susceptible     Ceftriaxone ($) Susceptible     Ciprofloxacin ($) Susceptible [1]      Gentamicin ($) Resistant     Levofloxacin ($) Intermediate [1]      Meropenem ($$) Susceptible     Nitrofurantoin Resistant     Piperacillin/Tazobac ($) Susceptible     Tobramycin ($) Resistant     Trimeth/Sulfa Susceptible            [1]   **FDA INTERPRETATION REFLECTED, REFER TO CLSI FOR ALTERNATE INTERPRETATIONS. ** Procedures:   Date of Procedure: 9/24/2020   Preoperative Diagnosis: LEFT DISTAL TIBIAL SHAFT  FRACTURE, LEFT PROXIMAL FIBULA FRACTURE, LEFT DISTAL ARTICULAR TIBIA FRACTURE  Postoperative Diagnosis: LEFT DISTAL TIBIAL SHAFT  FRACTURE, LEFT PROXIMAL FIBULA FRACTURE, LEFT DISTAL ARTICULAR TIBIA FRACTURE  Procedure:   1. LEFT TIBIA INTRA MEDULLARY NAIL (ANES CHOICE)  2. FIXATION DISTAL TIBIA ARTICULAR FRACTURE     Surgeon: Markus Vizcaino MD  Assistant(s): Hospital surgical assistant, Shahram An  Anesthesia: General   Estimated Blood Loss: 200cc  Specimens: * No specimens in log *   Complications: None      Imaging  Xr Hip Lt W Or Wo Pelv 2-3 Vws    Result Date: 9/24/2020  IMPRESSION: Bilateral hip osteoarthritis. No acute abnormality. Xr Tib/fib Lt    Result Date: 9/25/2020  IMPRESSION: Postoperative appearance of the left tibia. Xr Tib/fib Lt    Result Date: 9/24/2020  IMPRESSION: Satisfactory appearance of left tibia and fibula fractures in fiberglass splint    Xr Tib/fib Lt    Result Date: 9/24/2020  IMPRESSION: Distal tibial and proximal fibular nondisplaced fractures. Ct Low Ext Lt Wo Cont    Result Date: 9/25/2020  IMPRESSION: Unchanged tibia and fibular fractures. The distal tibial fracture has intra-articular extension into the base of the medial malleolus. No articular surface offset or depression. Osteopenia. No underlying bone lesion. Xr Chest Port    Result Date: 9/24/2020  IMPRESSION: No acute process identified. Mary Norris Xr Technologist Service    Result Date: 9/24/2020  IMPRESSION: Left lower extremity procedure in progress.  INTERPRETATION PROVIDED FOR COMPLIANCE ONLY AT NO CHARGE         Chronic diagnoses   Problem List as of 9/28/2020 Date Reviewed: 12/19/2019          Codes Class Noted - Resolved    Acute cystitis with hematuria ICD-10-CM: N30.01  ICD-9-CM: 595.0  9/25/2020 - Present        Sinus arrhythmia ICD-10-CM: I49.8  ICD-9-CM: 427.89  9/25/2020 - Present        History of hypertension ICD-10-CM: Z86.79  ICD-9-CM: V12.59  9/25/2020 - Present        * (Principal) Fracture of tibia and fibula ICD-10-CM: S82.209A, S82.409A  ICD-9-CM: 823.82  9/24/2020 - Present        Anxiety ICD-10-CM: F41.9  ICD-9-CM: 300.00  9/24/2020 - Present        Confusion ICD-10-CM: R41.0  ICD-9-CM: 298.9  1/2/2020 - Present        Hard of hearing (Chronic) ICD-10-CM: H91.90  ICD-9-CM: 389.9  12/19/2019 - Present        Dementia ([address] (Chronic) ICD-10-CM: F03.90  ICD-9-CM: 294.20  12/19/2019 - Present        Debility (Chronic) ICD-10-CM: R53.81  ICD-9-CM: 799.3  12/19/2019 - Present        Hypokalemia due to loss of potassium ICD-10-CM: E87.6  ICD-9-CM: 276.8  12/16/2019 - Present        Leukocytosis ICD-10-CM: D72.829  ICD-9-CM: 288.60  12/15/2019 - Present        Atrial fibrillation with RVR (HCC) ICD-10-CM: I48.91  ICD-9-CM: 427.31  12/15/2019 - Present        Hydronephrosis of right kidney ICD-10-CM: N13.30  ICD-9-CM: 025  12/15/2019 - Present        Bilateral kidney stones (Chronic) ICD-10-CM: N20.0  ICD-9-CM: 592.0  12/15/2019 - Present        RESOLVED: Weakness ICD-10-CM: R53.1  ICD-9-CM: 780.79  1/2/2020 - 9/24/2020        RESOLVED: Weak ICD-10-CM: R53.1  ICD-9-CM: 780.79  12/19/2019 - 9/24/2020        RESOLVED: Pneumonia ICD-10-CM: J18.9  ICD-9-CM: 764  12/19/2019 - 12/25/2019        RESOLVED: HTN (hypertension) (Chronic) ICD-10-CM: I10  ICD-9-CM: 401.9  12/16/2019 - 9/25/2020              Discharge/Transfer Medications  Current Discharge Medication List      START taking these medications    Details   amiodarone (CORDARONE) 200 mg tablet Take 1 Tab by mouth daily. Qty: 30 Tab, Refills: 0      nitrofurantoin, macrocrystal-monohydrate, (Macrobid) 100 mg capsule Take 1 Cap by mouth two (2) times a day for 4 days. Qty: 8 Cap, Refills: 0      cefdinir (OMNICEF) 300 mg capsule Take 1 Cap by mouth two (2) times a day for 4 days.  Indications: bacterial urinary tract infection  Qty: 8 Cap, Refills: 0 metoprolol tartrate (LOPRESSOR) 25 mg tablet Take 0.5 Tabs by mouth two (2) times a day. Qty: 30 Tab, Refills: 0      oxyCODONE-acetaminophen (PERCOCET) 5-325 mg per tablet Take 1 Tab by mouth every four (4) hours as needed for Pain for up to 3 days. Max Daily Amount: 6 Tabs. Qty: 20 Tab, Refills: 0    Associated Diagnoses: Closed fracture of left tibia and fibula, initial encounter         CONTINUE these medications which have NOT CHANGED    Details   acetaminophen (TYLENOL) 650 mg TbER Take 650 mg by mouth every eight (8) hours. busPIRone (BUSPAR) 10 mg tablet Take 10 mg by mouth two (2) times a day. ferrous sulfate 325 mg (65 mg iron) tablet Take 325 mg by mouth daily. escitalopram oxalate (Lexapro) 10 mg tablet Take 10 mg by mouth daily. polyethylene glycol (Miralax) 17 gram/dose powder Take 17 g by mouth daily. ketotifen (Zaditor) 0.025 % (0.035 %) ophthalmic solution Administer 1 Drop to both eyes two (2) times a day. Indications: allergic conjunctivitis              Diet:  Regular diet. Thins. Activity:  As tolerated w/assistance. Disposition: [employer] Discharge instructions to patient/family  Please seek medical attention for any new or worsening symptoms particularly fever, chest pain, shortness of breath, abdominal pain, nausea, vomiting.     Follow up plans/appointments  Follow-up Information     Follow up With Specialties Details Why Contact Info    Dudley Zuñiga PA-C Physician Assistant Schedule an appointment as soon as possible for a visit For 2 week follow up [address] [place] [address],[address] [phone]      Tony Garcia MD Cardiology Schedule an appointment as soon as possible for a visit  [facility] [phone]      Stab, Divya [place], [address] [place] [address] [phone]             Marty Fitzpatrick MD  Family Medicine Resident       For Billing    Chief Complaint   Patient presents with   [facility] [place] Problems  Date Reviewed: 12/19/2019          Codes Class Noted POA    Acute cystitis with hematuria ICD-10-CM: N30.01  ICD-9-CM: 595.0  9/25/2020 Unknown        Sinus arrhythmia ICD-10-CM: I49.8  ICD-9-CM: 427.89  9/25/2020 Unknown        History of hypertension ICD-10-CM: Z86.79  ICD-9-CM: V12.59  9/25/2020 Unknown        * (Principal) Fracture of tibia and fibula ICD-10-CM: S82.209A, S82.409A  ICD-9-CM: 823.82  9/24/2020 Unknown        Anxiety ICD-10-CM: F41.9  ICD-9-CM: 300.00  9/24/2020 Unknown        Confusion ICD-10-CM: R41.0  ICD-9-CM: 298.9  1/2/2020 Yes        Hard of hearing (Chronic) ICD-10-CM: H91.90  ICD-9-CM: 389.9  12/19/2019 Yes        Dementia ([address] (Chronic) ICD-10-CM: F03.90  ICD-9-CM: 294.20  12/19/2019 Yes        Leukocytosis ICD-10-CM: O38.267  ICD-9-CM: 288.60  12/15/2019 Unknown        Atrial fibrillation with RVR ([address] ICD-10-CM: I48.91  ICD-9-CM: 427.31  12/15/2019 Unknown

## 2020-09-26 NOTE — ROUTINE PROCESS
TRANSFER - IN REPORT: 
 
Verbal report received from Delores(name) on Anjali Elaine  being received from 4th floor(unit) for change in patient condition(a fib with RVR) Report consisted of patients Situation, Background, Assessment and  
Recommendations(SBAR). Information from the following report(s) SBAR, Kardex, Procedure Summary, Intake/Output, MAR, Accordion, Recent Results and Med Rec Status was reviewed with the receiving nurse. Opportunity for questions and clarification was provided. Assessment completed upon patients arrival to unit and care assumed. 1600 - Patient was unable to follow commands and failed dysphagia screening. Patient made NPO. Speech consult placed.

## 2020-09-26 NOTE — PROGRESS NOTES
Problem: Mobility Impaired (Adult and Pediatric)  Goal: *Acute Goals and Plan of Care (Insert Text)  Description: FUNCTIONAL STATUS PRIOR TO ADMISSION: Wheelchair user in LTC facility. Pt is not supposed to walk but does get up without assistance. HOME SUPPORT PRIOR TO ADMISSION: In LTC facility. Can return there being NWB. Physical Therapy Goals  Initiated 9/26/2020  1. Patient will move from supine to sit and sit to supine , scoot up and down, and roll side to side in bed with moderate assistance  within 7 day(s). 2.  Patient will transfer from bed to chair and chair to bed with maximal assistance using the least restrictive device within 7 day(s). 3.  Patient will perform sit to stand with maximal assistance within 7 day(s). 4.  Patient will tolerate sitting EOB for 3 minutes with minimal assistance within 7 days. 5.  Patient will follow 25% of one-step-commands with repetition within 7 days. Outcome: Progressing Towards Goal   PHYSICAL THERAPY EVALUATION  Patient: Doreen Montemayor (20 y.o. female)  Date: 9/26/2020  Primary Diagnosis: Fracture of tibia and fibula [S82.209A, S82.409A]  Procedure(s) (LRB):  LEFT TIBIA INTRA MEDULLARY NAIL (ANES CHOICE) (Left) 2 Days Post-Op   Precautions:   Fall, Aspiration, NWB    ASSESSMENT  Based on the objective data described below, the patient presents with global weakness, poor command following, agitation and baseline dementia following L tibia IM nail POD#2. Pt fell from w/c at LTC facility. Pt unable to follow any commands today and becoming very tearful and screaming out without stimuli. Performed PROM to RLE without pain. Screams when RUE is moved. Does not tolerate bed mobility or repositioning though quickly falls back to sleep when left alone. Pt is a poor candidate for rehab and with NWB restrictions will likely remain bed bound with the exception of shayne lift to w/c. Current Level of Function Impacting Discharge (mobility/balance):  Total care    Functional Outcome Measure: The patient scored 0/28 on the Tinetti outcome measure which is indicative of high fall risk. Other factors to consider for discharge: return to LTC     Patient will benefit from skilled therapy intervention to address the above noted impairments. PLAN :  Recommendations and Planned Interventions: bed mobility training, transfer training, gait training, therapeutic exercises, neuromuscular re-education, patient and family training/education, and therapeutic activities      Frequency/Duration: Patient will be followed by physical therapy:  3 times a week to address goals. Recommendation for discharge: (in order for the patient to meet his/her long term goals)  Physical therapy at least 2 days/week in the home vs none    This discharge recommendation:  Has been made in collaboration with the attending provider and/or case management    IF patient discharges home will need the following DME: mechanical lift         SUBJECTIVE:   Patient stated screaming unintelligibly.     OBJECTIVE DATA SUMMARY:   HISTORY:    Past Medical History:   Diagnosis Date    Arthritis     Chronic kidney disease     kidney stones    Chronic pain     right hip    HTN (hypertension) 12/16/2019    Hypertension     Ill-defined condition     chronic diarrhea    Weak 12/19/2019    Weakness 1/2/2020     Past Surgical History:   Procedure Laterality Date    HX APPENDECTOMY      HX ORTHOPAEDIC      bilateral carpal tunnel    HX TONSILLECTOMY         Personal factors and/or comorbidities impacting plan of care: arthritis, CKD, dementia    Home Situation  Home Environment: Long term care([facility])  One/Two Story Residence: One story  Living Alone: No  Support Systems: (LTC)  Patient Expects to be Discharged to[de-identified] Assisted living(LTC)  Current DME Used/Available at Home: Wheelchair    EXAMINATION/PRESENTATION/DECISION MAKING:   Critical Behavior:  Neurologic State: Alert, Confused  Orientation Level: Disoriented X4  Cognition: No command following, Poor safety awareness, Impaired decision making, Memory loss     Hearing:     Skin:    Edema:   Range Of Motion:  AROM: Grossly decreased, non-functional           PROM: Generally decreased, functional           Strength:    Strength: Grossly decreased, non-functional                    Tone & Sensation:   Tone: Normal              Sensation: Intact               Coordination:  Coordination: Grossly decreased, non-functional  Vision:      Functional Mobility:  Bed Mobility:  Rolling: Total assistance  Supine to Sit: Total assistance  Sit to Supine: Total assistance  Scooting: Total assistance  Transfers:                             Balance:   Sitting: Impaired  Ambulation/Gait Training:                                    Functional Measure:  Tinetti test:    Sitting Balance: 0  Arises: 0  Attempts to Rise: 0  Immediate Standing Balance: 0  Standing Balance: 0  Nudged: 0  Eyes Closed: 0  Turn 360 Degrees - Continuous/Discontinuous: 0  Turn 360 Degrees - Steady/Unsteady: 0  Sitting Down: 0  Balance Score: 0 Balance total score  Indication of Gait: 0  R Step Length/Height: 0  L Step Length/Height: 0  R Foot Clearance: 0  L Foot Clearance: 0  Step Symmetry: 0  Step Continuity: 0  Path: 0  Trunk: 0  Walking Time: 0  Gait Score: 0 Gait total score  Total Score: 0/28 Overall total score         Tinetti Tool Score Risk of Falls  <19 = High Fall Risk  19-24 = Moderate Fall Risk  25-28 = Low Fall Risk  Tinetti ME. Performance-Oriented Assessment of Mobility Problems in Elderly Patients. Kumar 66; [id number].  (Scoring Description: PT Bulletin Feb. 10, 1993)    Older adults: Kylee Chaudhry et al, 2009; n = 1000 [facility] elderly evaluated with ABC, LIZZETTE, ADL, and IADL)  · Mean LIZZETTE score for males aged 69-68 years = 26.21(3.40)  · Mean LIZZETTE score for females age 69-68 years = 25.16(4.30)  · Mean LIZZETTE score for males over 80 years = 23.29(6.02)  · Mean LIZZETTE score for females over 80 years = 17.20(8.32)            Physical Therapy Evaluation Charge Determination   History Examination Presentation Decision-Making   HIGH Complexity :3+ comorbidities / personal factors will impact the outcome/ POC  LOW Complexity : 1-2 Standardized tests and measures addressing body structure, function, activity limitation and / or participation in recreation  LOW Complexity : Stable, uncomplicated  Other outcome measures Tinetti  LOW       Based on the above components, the patient evaluation is determined to be of the following complexity level: LOW     Pain Rating:  Not rated    Activity Tolerance:   Poor and SpO2 stable on RA  Please refer to the flowsheet for vital signs taken during this treatment. After treatment patient left in no apparent distress:   Supine in bed, Heels elevated for pressure relief, Call bell within reach, and Side rails x 3    COMMUNICATION/EDUCATION:   The patients plan of care was discussed with: Occupational therapist and Registered nurse. Patient is unable to participate in goal setting and plan of care.     Thank you for this referral.  Vinay Drummond, PT   Time Calculation: 17 mins

## 2020-09-26 NOTE — PROGRESS NOTES
[address] [facility] [address] [place] [address]   Office [phone]  Fax [phone]          Assessment and Plan     Raul Mi is a 79 y.o. female with PMH of bilateral nephrolithiasis, hypokalemia, debility, dementia, and HTN who is admitted for Tib/Fib Fracture.     24 Hour Events: None. Closed nondisplaced Proximal Tibia and Fibula Fracture: s/p left tibia repair tibia/fibula fx 9/24. GLF from wheel chair per [facility]. - Ortho following, f/u in 2 wks outpatient  - Enoxaparin 40 mg daily   - Dilaudid 0.2 mg iv q4h prn  - Oxycodone 5mg po q4h prn   - COVID test Negative x2. Sepsis 2/2 UTI: POA WBC 16.9. LA nml. Afebrile, HDS.   - Ucx possible enterococcus species, spec/sens pending   - S/p Ceftriaxone (2d), start levofloxacin 750mg IV daily (9/26)  - Bcx 9/25 NG x 2 days  - Daily CBC and CMP                        Afib w/ RVR: POA EKG: NSR, occasional PVCs. 9/26 pt afib w/ RVR refractory to diltiazem, requiring amiodarone. Trop negx1. Mg and Phos: wnl. [id number] SCORE: 4 points. - Amiodarone drip 0.5-1 mg/min (9/26)  - Cardiology c/s, appreciate recs  - Monitor with telemetry  - Mag and Phos daily. - Anticoagulated with lovenox   - Consider anticoagulation at d/c, high risk of bleeding with frequent falls    Anemia: POA Hgb 13.3. BL Hgb   - Home Ferrous Sulfate 325 mg daily     Hypertension: POA /46. No home meds. - Monitor BP and adjust as needed     Depression/Anxiety  - Home Lexapro 10mg po daily and Buspar 10mg po BID     Constipation: 2/2 opiate use. - Miralax daily     Dementia: No official diagnosis. No home meds. - Neuropsych testing outpt     Hard of Hearing: According to family pt responds to written text.      FEN/GI - Regular diet. Activity - Bed rest, complete  DVT prophylaxis - Lovenox  GI prophylaxis - Not indicated at this time  Fall prophylaxis - Fall precautions ordered. Disposition Plan to d/c to Nursing Home.  Consulting PT/OT/CM  Code Status - Full, discussed with patient / caregivers. Next of Kin Name and Contact Relative, Merna Peer [phone]      Marty Fitzpatrick MD  Family Medicine Resident         Subjective / Objective     Subjective Pt was asleep. Pt is nonverbal at baseline. She opened her eyes with touch and loud voice. Temp (24hrs), Av.8 °F (37.1 °C), Min:97.9 °F (36.6 °C), Max:99.7 °F (37.6 °C)     Objective:  Vital signs: (most recent): Blood pressure (!) 98/52, pulse (!) 134, temperature 97.9 °F (36.6 °C), resp. rate 20, height 5' 2\" (1.575 m), weight 175 lb (79.4 kg), SpO2 96 %. Respiratory: O2 Flow Rate (L/min): 2 l/min O2 Device: Nasal cannula   Visit Vitals  BP (!) 106/44   Pulse 70   Temp 99.7 °F (37.6 °C)   Resp 17   Ht 5' 2\" (1.575 m)   Wt 175 lb (79.4 kg)   SpO2 95%   BMI 32.01 kg/m²     General: Screaming. No alert, oriented, verbally responsive. Restraints/mittens B/L in place. Head: Normocephalic. Atraumatic. Respiratory: CTAB. No w/r/r/c.  Cardiovascular: Regular. Normal S1,S2. No m/r/g.   GI: Nondistended.+ bowel sounds. Nontender. No rebound tenderness or guarding. Extremities: LLE with boot. RLE Distal pulse present. Skin: Warm, dry. No rashes. Neuro: Nonverbal at baseline. Responds to touch and loud voice. I/O:  Date 20 - 20 0620 - 20 0659   Shift 2373-1623 9290-2617 24 Hour Total 7884-7839 0998-8598 24 Hour Total   INTAKE   P.O. 10 0 10        P. O. 10 0 10      I. V.(mL/kg/hr) 426.5(0.4)  426.5(0.2)        Volume (sodium chloride 0.9 % bolus infusion 500 mL) 0  0        Volume (potassium phosphate 15 mmol in 0.9% sodium chloride 250 mL infusion) 176.5  176.5        Volume (cefTRIAXone (ROCEPHIN) 1 g in 0.9% sodium chloride (MBP/ADV) 50 mL) 100  100        Volume (levoFLOXacin (LEVAQUIN) 750 mg in D5W IVPB) 150  150      Shift Total(mL/kg) 436.5(5.5) 0(0) 436.5(5.5)      OUTPUT   Urine(mL/kg/hr)           Urine Occurrence(s) 2 x  2 x      Stool Stool Occurrence(s) 1 x  1 x      Shift Total(mL/kg)         .5 0 436.5      Weight (kg) 79.4 79.4 79.4 79.4 79.4 79.4       CBC:  Recent Labs     09/27/20  0420 09/26/20  1100 09/25/20  0359   WBC 11.1* 15.2* 19.3*   HGB 9.6* 11.4* 11.6   HCT 30.7* 35.2 35.5    240 688       Metabolic Panel:  Recent Labs     09/27/20  0420 09/26/20  1127 09/25/20  0358 09/24/20  1232    144 140 142   K 4.0 4.1 4.7 4.6   * 109* 108 111*   CO2 26 26 25 28   BUN 16 15 14 18   CREA 0.54* 0.52* 0.67 0.60   GLU 95 103* 169* 111*   CA 8.3* 8.4* 8.2* 8.7   MG 1.9 2.1  --  2.2   PHOS 3.4 2.8  --  3.3   INR  --   --   --  1.0          For Billing    Chief Complaint   Patient presents with   [facility] [place] Problems  Date Reviewed: 12/19/2019          Codes Class Noted POA    Acute cystitis with hematuria ICD-10-CM: N30.01  ICD-9-CM: 595.0  9/25/2020 Unknown        Sinus arrhythmia ICD-10-CM: I49.8  ICD-9-CM: 427.89  9/25/2020 Unknown        History of hypertension ICD-10-CM: Z86.79  ICD-9-CM: V12.59  9/25/2020 Unknown        * (Principal) Fracture of tibia and fibula ICD-10-CM: S82.209A, S82.409A  ICD-9-CM: 823.82  9/24/2020 Unknown        Anxiety ICD-10-CM: F41.9  ICD-9-CM: 300.00  9/24/2020 Unknown        Confusion ICD-10-CM: R41.0  ICD-9-CM: 298.9  1/2/2020 Yes        Hard of hearing (Chronic) ICD-10-CM: H91.90  ICD-9-CM: 389.9  12/19/2019 Yes        Dementia ([address] (Chronic) ICD-10-CM: F03.90  ICD-9-CM: 294.20  12/19/2019 Yes        Leukocytosis ICD-10-CM: D55.249  ICD-9-CM: 288.60  12/15/2019 Unknown        Atrial fibrillation with RVR ([facility] ICD-10-CM: I48.91  ICD-9-CM: 427.31  12/15/2019 Unknown

## 2020-09-26 NOTE — PROGRESS NOTES
Problem: Self Care Deficits Care Plan (Adult)  Goal: *Acute Goals and Plan of Care (Insert Text)  Description:   FUNCTIONAL STATUS PRIOR TO ADMISSION: Patient lives at [address] [place] [address]. Unable to provide history. Per chart she was at wc level and would attempt to stand. HOME SUPPORT: LTC staff    Occupational Therapy Goals  Initiated 9/26/2020  1. Patient will perform self-feeding with moderate assistance  within 7 day(s). 2.  Patient will perform upper body dressing with maximal assistance within 7 day(s). 3.  Patient will participate in ADL tasks in supported sitting (bed in chair or recliner) x 5 mins with moderate assistance  within 7 day(s). 4.  Patient will perform perform one grooming task with moderate assistance  within 7 day(s). Outcome: Progressing Towards Goal   OCCUPATIONAL THERAPY EVALUATION  Patient: Rama Martinez (21 y.o. female)  Date: 9/26/2020  Primary Diagnosis: Fracture of tibia and fibula [S82.209A, S82.409A]  Procedure(s) (LRB):  LEFT TIBIA INTRA MEDULLARY NAIL (ANES CHOICE) (Left) 2 Days Post-Op   Precautions:  Fall, Aspiration, NWB- LLE    ASSESSMENT  Based on the objective data described below, the patient presents with severe impairments in ability to complete ADL tasks secondary to agitation, confusion, generalized weakness and potential hx of dementia. She is POD 2 from L tib IM nailing and is NWB. She had an unwitnessed fall at [facility]. Patient with no command follow and frequent yelling out with PROM on RUE no yelling with PROM of RLE or LUE or with gentle repositioning of LLE. Attempted rolling with total A x2 with poor tolerance. 97% SpO2 on RA at rest, attempted to replace nc and patient with increase agitation, alerted nursing. Drowsy through out session, falling to sleep in between aspects of the assessment. Placed B mits back on hands at end of session.   At this time with RLE NWB, dementia and sepsis potentially causing additional confusion patient is poor rehab candidate. Recommend repositioning every two hours and skin check of L CAM boot frequently. Leonides Spare for Jolane Knock transfers. Current Level of Function Impacting Discharge (ADLs/self-care): Total care    Functional Outcome Measure: The patient scored 0/100 on the Barthel Index outcome measure which is indicative of severe impairments with ADL tasks. Other factors to consider for discharge: agitation, dementia, poor communication     Patient will benefit from skilled therapy intervention to address the above noted impairments. PLAN :  Recommendations and Planned Interventions: self care training, functional mobility training, therapeutic exercise, balance training, therapeutic activities, endurance activities, patient education, home safety training, and family training/education    Frequency/Duration: Patient will be followed by occupational therapy 2 times a week to address goals.     Recommendation for discharge: (in order for the patient to meet his/her long term goals)  To be determined: return to LTC    This discharge recommendation:  Has not yet been discussed the attending provider and/or case management    IF patient discharges home will need the following DME: shayne lift, reclining wc       SUBJECTIVE:   Patient stated .    OBJECTIVE DATA SUMMARY:   HISTORY:   Past Medical History:   Diagnosis Date    Arthritis     Chronic kidney disease     kidney stones    Chronic pain     right hip    HTN (hypertension) 12/16/2019    Hypertension     Ill-defined condition     chronic diarrhea    Weak 12/19/2019    Weakness 1/2/2020     Past Surgical History:   Procedure Laterality Date    HX APPENDECTOMY      HX ORTHOPAEDIC      bilateral carpal tunnel    HX TONSILLECTOMY         Expanded or extensive additional review of patient history:     Home Situation  Home Environment: Long term care([facility])  One/Two Story Residence: One story  Living Alone: No  Support Systems: (LTC)  Patient Expects to be Discharged to[de-identified] Assisted living(LTC)  Current DME Used/Available at Home: Wheelchair    Hand dominance: unknown    EXAMINATION OF PERFORMANCE DEFICITS:  Cognitive/Behavioral Status:  Neurologic State: Alert;Confused  Orientation Level: Disoriented X4  Cognition: No command following;Poor safety awareness; Impaired decision making;Memory loss  Hearing:   [place]      Vision/Perceptual:                           Acuity: (unable to assess- confusion)         Range of Motion:  AROM: Grossly decreased, non-functional  PROM: Generally decreased, functional                      Strength:  Strength: Grossly decreased, non-functional                Coordination:  Coordination: Grossly decreased, non-functional  Fine Motor Skills-Upper: Left Intact; Right Intact    Gross Motor Skills-Upper: Left Intact; Right Intact    Tone & Sensation:  Tone: Normal  Sensation: Intact                      Balance:  Sitting: Impaired    Functional Mobility and Transfers for ADLs:  Bed Mobility:  Rolling: Total assistance  Supine to Sit: Total assistance  Sit to Supine: Total assistance  Scooting: Total assistance    Transfers:       ADL Assessment:  Feeding: Total assistance    Oral Facial Hygiene/Grooming: Total assistance    Bathing: Total assistance    Upper Body Dressing: Total assistance    Lower Body Dressing: Total assistance    Toileting: Total assistance     Functional Measure:  Barthel Index:    Bathin  Bladder: 0  Bowels: 0  Groomin  Dressin  Feedin  Mobility: 0  Stairs: 0  Toilet Use: 0  Transfer (Bed to Chair and Back): 0  Total: 0/100        The Barthel ADL Index: Guidelines  1. The index should be used as a record of what a patient does, not as a record of what a patient could do. 2. The main aim is to establish degree of independence from any help, physical or verbal, however minor and for whatever reason. 3. The need for supervision renders the patient not independent.   4. A patient's performance should be established using the best available evidence. Asking the patient, friends/relatives and nurses are the usual sources, but direct observation and common sense are also important. However direct testing is not needed. 5. Usually the patient's performance over the preceding 24-48 hours is important, but occasionally longer periods will be relevant. 6. Middle categories imply that the patient supplies over 50 per cent of the effort. 7. Use of aids to be independent is allowed. Tj Soliz., Barthel, D.W. (2614). Functional evaluation: the Barthel Index. [address] [facility] (14)2. Urvashi Quintana mad[place] KYE Li, Orlando Connors., Mg Huertas., [place], [address] (1999). Measuring the change indisability after inpatient rehabilitation; comparison of the responsiveness of the Barthel Index and Functional [place] Measure. Journal of Neurology, Neurosurgery, and Psychiatry, 66(4), 598-584. Néstor Cobian, N.J.A, MALENA Alfaro, & Jacinto Brumfield MRO. (2004.) Assessment of post-stroke quality of life in cost-effectiveness studies: The usefulness of the Barthel Index and the EuroQoL-5D. Quality of Life Research, 15, 812-37         Occupational Therapy Evaluation Charge Determination   History Examination Decision-Making   LOW Complexity : Brief history review  LOW Complexity : 1-3 performance deficits relating to physical, cognitive , or psychosocial skils that result in activity limitations and / or participation restrictions  LOW Complexity : No comorbidities that affect functional and no verbal or physical assistance needed to complete eval tasks       Based on the above components, the patient evaluation is determined to be of the following complexity level: LOW   Pain Rating:  Yelling out with RUE PROM- terminated secondary to yelling    Activity Tolerance:   Poor  Please refer to the flowsheet for vital signs taken during this treatment.     After treatment patient left in no apparent distress:    Patient positioned in R sidelying for pressure relief, Call bell within reach, and Bed / chair alarm activated    COMMUNICATION/EDUCATION:   The patients plan of care was discussed with: Physical therapist and Registered nurse. Patient is unable to participate in goal setting and plan of care. This patients plan of care is appropriate for delegation to JAGDEEP.     Thank you for this referral.  Niraj Mclaughlin, OT  Time Calculation: 13 mins

## 2020-09-26 NOTE — PROGRESS NOTES
1910-Bedside and Verbal shift change report given to Moises Puente (oncoming nurse) by Feliz Osman (offgoing nurse). Report included the following information SBAR, Kardex and Cardiac Rhythm NSR.     2130- RN noticed potassium phos. infusion not administered to pt. a 1400 as stated in [facility] Primary RN notified family practice, provider made aware. Started medication at 2130.      2230- spoke with Darlyn marcelino via phone to discuss pt. Baseline mental status. Mrs. Eli Galeana stated that Mrs. Kristian Pérez went to [place] back in January of this year and has not seen her cousin since March. When she saw Mrs. Kristian Pérez for the first time in March, she stated that she was non ambulatory in a wheelchair, appeared to have moderate dementia, agitation and anxiety. Huong Alvarenga did confirm that Mrs. Kristian Pérez was unable to verbally communicate her needs, and that Maxim Green is much like a baby, that she will yell at the top of her lungs until her needs are met\". She also confirmed that Mrs. Kristian Pérez is hard of hearing, but she is a really good reader- so that might be the best way to communicate with the pt. Mrs. Eli Galeana also mentioned that she is really concerned with pt. Receiving amiodarone for tx. Of her afib w/ rvr. She does not prefer her to be given that medication and would like to speak with the prescribing provider of this medication to see if there is anything else she can receive if necessary. Mrs. Eli Galeana would like to be notified by team ASAP so discuss further treatment plan. Bedside and Verbal shift change report given to Jaden Cordero RN (oncoming nurse) by Moises Puente (offgoing nurse). Report included the following information SBAR, Kardex and Cardiac Rhythm NSR.

## 2020-09-26 NOTE — PROGRESS NOTES
Offered food and water to patient, however she is refusing to eat. No command following. Unable to administer oral medication.

## 2020-09-26 NOTE — PROGRESS NOTES
Problem: Falls - Risk of  Goal: *Absence of Falls  Description: Document Viviana Oconnor Fall Risk and appropriate interventions in the flowsheet.   Outcome: Progressing Towards Goal  Note: Fall Risk Interventions:  Mobility Interventions: Bed/chair exit alarm, Patient to call before getting OOB, Strengthening exercises (ROM-active/passive), Utilize walker, cane, or other assistive device    Mentation Interventions: Bed/chair exit alarm, More frequent rounding    Medication Interventions: Bed/chair exit alarm, Patient to call before getting OOB, Teach patient to arise slowly    Elimination Interventions: Bed/chair exit alarm, Call light in reach, Toilet paper/wipes in reach    History of Falls Interventions: Bed/chair exit alarm, Room close to nurse's station, Utilize gait belt for transfer/ambulation

## 2020-09-26 NOTE — PROGRESS NOTES
I personally saw Clement Mustafa and evaluated the patient at 3:10 AM on 09/26/20. He is unable to comprehend and follow through with safety instructions due to underlying behiavor and failure of alternative methods to keep pt safe. Visit Vitals  BP (!) 170/66 (BP 1 Location: Right leg, BP Patient Position: At rest)   Pulse 84   Temp 98.6 °F (37 °C)   Resp 20   Ht 5' 2\" (1.575 m)   Wt 175 lb (79.4 kg)   SpO2 96%   BMI 32.01 kg/m²       Restraint type: Mitt: both  Reason for restraints: patient is pulling IV lines and nasal canula   Duration: 24 hours    Restraints must be removed when an alternative is available and effective and/or patient no longer meets criteria.  The patient will be re-evaluated in 24 hours for need of restraints.  Hudson Aquino MD  [facility] Medicine Resident  [address] [address]

## 2020-09-26 NOTE — PROGRESS NOTES
Bedside shift change report given to Osiel Shah (oncoming nurse) by Renay Rosado (offgoing nurse). Report included the following information SBAR, Kardex, Intake/Output, MAR and Recent Results.

## 2020-09-26 NOTE — PROGRESS NOTES
Orthopedic Progress Note    POD 2 Days Post-Op    Procedure:  Procedure(s):  LEFT TIBIA INTRA MEDULLARY NAIL (ANES CHOICE)    Subjective:     Patient continues to have issues with dementia and being combative has required soft restraints. Patient continues to be nonverbal but is yelling out at times throughout the night. No other acute concerns from nursing standpoint. Patient is nonverbal and does not give any other history on examination today. Objective:     Blood pressure (!) 170/66, pulse 89, temperature 98.6 °F (37 °C), resp. rate 20, height 5' 2\" (1.575 m), weight 79.4 kg (175 lb), SpO2 96 %. Temp (24hrs), Av.2 °F (36.8 °C), Min:97.5 °F (36.4 °C), Max:98.6 °F (37 °C)      Physical Exam:   General: No acute distress on entering room but just entering room and talking patient screaming out. Patient is nonverbal and does not follow commands. Respiratory: Nonlabored at moment with nasal cannula  Cardiovascular: Good peripheral pulses normal rate   Examination of the left tibia reveals that the dressing is clean, dry and intact. Cam boot in place patient with slight persistent Achilles contracture sensation is intact to light touch. Calf supple, nontender.      Labs:   Recent Results (from the past 24 hour(s))   SARS-COV-2    Collection Time: 20 10:52 AM   Result Value Ref Range    Specimen source Nasopharyngeal      Specimen source Nasopharyngeal      Specimen type NP Swab      Health status Symptomatic Testing      COVID-19 PENDING    CULTURE, BLOOD, PAIRED    Collection Time: 20  2:01 PM    Specimen: Blood   Result Value Ref Range    Special Requests: NO SPECIAL REQUESTS      Culture result: NO GROWTH AFTER 15 HOURS            Assessment:     Principal Problem:    Fracture of tibia and fibula (2020)    Active Problems:    Leukocytosis (12/15/2019)      Hard of hearing (2019)      Dementia ([facility] [address] (2019)      Confusion (2020)      Anxiety (2020)      Acute cystitis with hematuria (2020)      Sinus arrhythmia (2020)      History of hypertension (2020)        Plan/Recommendations/Medical Decision Makin-year-old non-verbal demented female who is minimally ambulatory at baseline status post left tibia IM nailing and fixation of articular fragment, postop day #2    Admit to family medicine, defer medical management to primary service  -Nonweightbearing left lower extremity secondary to articular fracture  -Dressings to stay in place as has waterproof dressing to the knee and Dermabond on percutaneous incisions continue to monitor dressings for drainage.  -Continue cam boot for immobilization.  -Range of motion left knee as tolerated  -Ice and elevate as needed  -DVT prophylaxis: Lovenox 40 mg subcu daily, SCD right lower extremity  -Disposition: Skilled nurse facility versus back to [facility]  -Follow-up: 2 weeks [facility] with Nery Morgan call [phone] for scheduling       MD Isaac Ruiz.  Kale Collins MD  [facility] [phone]  Medical Staff : [facility]/ [address] [facility]  Office : [phone]

## 2020-09-26 NOTE — PROGRESS NOTES
Notified from RN of patient in Afib w/ HR in 120-130's for approximately 6 minutes. Patient at baseline mentation and unable to verbalize symptoms. Patient seen and assessed bedside. Visit Vitals  BP (!) 149/72 (BP 1 Location: Right arm, BP Patient Position: At rest)   Pulse (!) 124   Temp 97.9 °F (36.6 °C)   Resp 20   Ht 5' 2\" (1.575 m)   Wt 175 lb (79.4 kg)   SpO2 96%   BMI 32.01 kg/m²     General: Making incoherent sounds (no changes from this morning). Respiratory: CTAB, no w/r/r  Cardiovascular: Irregularly irregular. -140's. Extremities: LLE booted. BL upper extremities w/ mittens. Pulses present. Neuro: Nonverbal, making incoherent noises. Alert. A/P: 79 y.o. female with PMH of bilateral nephrolithiasis, hypokalemia, debility, dementia, and HTN who is admitted for Tib/Fib Fracture, now with Afib w/ RVR.     Afib w/ RVR: Possibly new onset (noted to have possible Afib during hospitalization in 12/2019, but not clear evidence of Afib)  - EKG  - Troponin  - BMP, Mg, Phos  - Metoprolol 5 mg IV if HR sustained >120's  - Will consult cardio    Lonnie No DO  09/26/20 11:30 AM

## 2020-09-27 LAB
ANION GAP SERPL CALC-SCNC: 6 MMOL/L (ref 5–15)
BACTERIA SPEC CULT: ABNORMAL
BACTERIA SPEC CULT: ABNORMAL
BASOPHILS # BLD: 0.1 K/UL (ref 0–0.1)
BASOPHILS NFR BLD: 1 % (ref 0–1)
BUN SERPL-MCNC: 16 MG/DL (ref 6–20)
BUN/CREAT SERPL: 30 (ref 12–20)
CALCIUM SERPL-MCNC: 8.3 MG/DL (ref 8.5–10.1)
CC UR VC: ABNORMAL
CHLORIDE SERPL-SCNC: 112 MMOL/L (ref 97–108)
CO2 SERPL-SCNC: 26 MMOL/L (ref 21–32)
CREAT SERPL-MCNC: 0.54 MG/DL (ref 0.55–1.02)
DIFFERENTIAL METHOD BLD: ABNORMAL
EOSINOPHIL # BLD: 0.3 K/UL (ref 0–0.4)
EOSINOPHIL NFR BLD: 2 % (ref 0–7)
ERYTHROCYTE [DISTWIDTH] IN BLOOD BY AUTOMATED COUNT: 13.6 % (ref 11.5–14.5)
GLUCOSE SERPL-MCNC: 95 MG/DL (ref 65–100)
HCT VFR BLD AUTO: 30.7 % (ref 35–47)
HGB BLD-MCNC: 9.6 G/DL (ref 11.5–16)
IMM GRANULOCYTES # BLD AUTO: 0.1 K/UL (ref 0–0.04)
IMM GRANULOCYTES NFR BLD AUTO: 1 % (ref 0–0.5)
LYMPHOCYTES # BLD: 1.9 K/UL (ref 0.8–3.5)
LYMPHOCYTES NFR BLD: 17 % (ref 12–49)
MAGNESIUM SERPL-MCNC: 1.9 MG/DL (ref 1.6–2.4)
MCH RBC QN AUTO: 30.9 PG (ref 26–34)
MCHC RBC AUTO-ENTMCNC: 31.3 G/DL (ref 30–36.5)
MCV RBC AUTO: 98.7 FL (ref 80–99)
MONOCYTES # BLD: 1.3 K/UL (ref 0–1)
MONOCYTES NFR BLD: 12 % (ref 5–13)
NEUTS SEG # BLD: 7.5 K/UL (ref 1.8–8)
NEUTS SEG NFR BLD: 67 % (ref 32–75)
NRBC # BLD: 0 K/UL (ref 0–0.01)
NRBC BLD-RTO: 0 PER 100 WBC
PHOSPHATE SERPL-MCNC: 3.4 MG/DL (ref 2.6–4.7)
PLATELET # BLD AUTO: 206 K/UL (ref 150–400)
PMV BLD AUTO: 10.7 FL (ref 8.9–12.9)
POTASSIUM SERPL-SCNC: 4 MMOL/L (ref 3.5–5.1)
RBC # BLD AUTO: 3.11 M/UL (ref 3.8–5.2)
SERVICE CMNT-IMP: ABNORMAL
SODIUM SERPL-SCNC: 144 MMOL/L (ref 136–145)
WBC # BLD AUTO: 11.1 K/UL (ref 3.6–11)

## 2020-09-27 PROCEDURE — 74011250637 HC RX REV CODE- 250/637: Performed by: STUDENT IN AN ORGANIZED HEALTH CARE EDUCATION/TRAINING PROGRAM

## 2020-09-27 PROCEDURE — 74011250636 HC RX REV CODE- 250/636: Performed by: STUDENT IN AN ORGANIZED HEALTH CARE EDUCATION/TRAINING PROGRAM

## 2020-09-27 PROCEDURE — 65660000000 HC RM CCU STEPDOWN

## 2020-09-27 PROCEDURE — 99233 SBSQ HOSP IP/OBS HIGH 50: CPT | Performed by: FAMILY MEDICINE

## 2020-09-27 PROCEDURE — 85025 COMPLETE CBC W/AUTO DIFF WBC: CPT

## 2020-09-27 PROCEDURE — 83735 ASSAY OF MAGNESIUM: CPT

## 2020-09-27 PROCEDURE — 74011250637 HC RX REV CODE- 250/637: Performed by: INTERNAL MEDICINE

## 2020-09-27 PROCEDURE — 74011250636 HC RX REV CODE- 250/636: Performed by: ORTHOPAEDIC SURGERY

## 2020-09-27 PROCEDURE — 74011250637 HC RX REV CODE- 250/637: Performed by: ORTHOPAEDIC SURGERY

## 2020-09-27 PROCEDURE — 36415 COLL VENOUS BLD VENIPUNCTURE: CPT

## 2020-09-27 PROCEDURE — 99223 1ST HOSP IP/OBS HIGH 75: CPT | Performed by: INTERNAL MEDICINE

## 2020-09-27 PROCEDURE — 84100 ASSAY OF PHOSPHORUS: CPT

## 2020-09-27 PROCEDURE — 80048 BASIC METABOLIC PNL TOTAL CA: CPT

## 2020-09-27 RX ORDER — NITROFURANTOIN 25; 75 MG/1; MG/1
100 CAPSULE ORAL 2 TIMES DAILY
Status: DISCONTINUED | OUTPATIENT
Start: 2020-09-27 | End: 2020-09-28 | Stop reason: HOSPADM

## 2020-09-27 RX ORDER — AMIODARONE HYDROCHLORIDE 200 MG/1
200 TABLET ORAL DAILY
Status: DISCONTINUED | OUTPATIENT
Start: 2020-09-28 | End: 2020-09-27

## 2020-09-27 RX ORDER — CEFDINIR 300 MG/1
300 CAPSULE ORAL EVERY 12 HOURS
Status: DISCONTINUED | OUTPATIENT
Start: 2020-09-27 | End: 2020-09-28 | Stop reason: HOSPADM

## 2020-09-27 RX ORDER — AMIODARONE HYDROCHLORIDE 200 MG/1
200 TABLET ORAL DAILY
Status: DISCONTINUED | OUTPATIENT
Start: 2020-09-27 | End: 2020-09-28 | Stop reason: HOSPADM

## 2020-09-27 RX ADMIN — HYDROMORPHONE HYDROCHLORIDE 0.2 MG: 1 INJECTION, SOLUTION INTRAMUSCULAR; INTRAVENOUS; SUBCUTANEOUS at 18:08

## 2020-09-27 RX ADMIN — HYDROMORPHONE HYDROCHLORIDE 0.2 MG: 1 INJECTION, SOLUTION INTRAMUSCULAR; INTRAVENOUS; SUBCUTANEOUS at 08:37

## 2020-09-27 RX ADMIN — ESCITALOPRAM OXALATE 10 MG: 10 TABLET ORAL at 08:34

## 2020-09-27 RX ADMIN — OXYCODONE 5 MG: 5 TABLET ORAL at 11:12

## 2020-09-27 RX ADMIN — Medication 10 ML: at 21:59

## 2020-09-27 RX ADMIN — SODIUM CHLORIDE 50 ML/HR: 900 INJECTION, SOLUTION INTRAVENOUS at 23:48

## 2020-09-27 RX ADMIN — LORAZEPAM 0.5 MG: 2 INJECTION INTRAMUSCULAR; INTRAVENOUS at 12:43

## 2020-09-27 RX ADMIN — Medication 10 ML: at 12:44

## 2020-09-27 RX ADMIN — BUSPIRONE HYDROCHLORIDE 10 MG: 5 TABLET ORAL at 08:33

## 2020-09-27 RX ADMIN — ENOXAPARIN SODIUM 40 MG: 40 INJECTION SUBCUTANEOUS at 08:36

## 2020-09-27 RX ADMIN — SODIUM CHLORIDE 100 ML/HR: 900 INJECTION, SOLUTION INTRAVENOUS at 06:59

## 2020-09-27 RX ADMIN — BUSPIRONE HYDROCHLORIDE 10 MG: 5 TABLET ORAL at 17:02

## 2020-09-27 RX ADMIN — POLYETHYLENE GLYCOL 3350 17 G: 17 POWDER, FOR SOLUTION ORAL at 08:35

## 2020-09-27 RX ADMIN — LORAZEPAM 0.5 MG: 2 INJECTION INTRAMUSCULAR; INTRAVENOUS at 00:23

## 2020-09-27 RX ADMIN — AMIODARONE HYDROCHLORIDE 200 MG: 200 TABLET ORAL at 14:56

## 2020-09-27 RX ADMIN — CEFDINIR 300 MG: 300 CAPSULE ORAL at 21:59

## 2020-09-27 RX ADMIN — NITROFURANTOIN MONOHYDRATE/MACROCRYSTALLINE 100 MG: 25; 75 CAPSULE ORAL at 21:59

## 2020-09-27 RX ADMIN — OXYCODONE 5 MG: 5 TABLET ORAL at 17:02

## 2020-09-27 RX ADMIN — LEVOFLOXACIN 750 MG: 5 INJECTION, SOLUTION INTRAVENOUS at 11:25

## 2020-09-27 RX ADMIN — Medication 3 MG: at 21:59

## 2020-09-27 RX ADMIN — FERROUS SULFATE TAB 325 MG (65 MG ELEMENTAL FE) 325 MG: 325 (65 FE) TAB at 08:34

## 2020-09-27 NOTE — PROGRESS NOTES
SHIFT CHANGE:  1930 Bedside and Verbal shift change report given to Alissa GUILLORY (oncoming nurse) by Gerhardt Gaskins (offgoing nurse). Report included the following information SBAR, Kardex, MAR and Recent Results. SHIFT SUMMARY:  2200 patient took medications crushed in applesauce. 0040  Ativan 0.5 mg administered as ordered for agitation. 0400  NO BLOOD WORK ORDERED THIS AM.  0700  New IV started, new blood work ordered and drawn. END OF SHIFT REPORT:  0730  Bedside and Verbal shift change report given to April (oncoming nurse) by Diamond Bernard (offgoing nurse). Report included the following information SBAR, Kardex, MAR and Recent Results.

## 2020-09-27 NOTE — PROGRESS NOTES
Spoke with pharm, given allergies limited Abx choices, per Pharm Cipro would work well and cover both as long as patient will not require further amio, otherwise inappropriate d/t QT prolongation. In that case macrobid + omnicef would be appropriate, macrobid despite beer's criteria would be okay given good CrCl of 63.6. We will await cardio recs and then determine most appropriate course.

## 2020-09-27 NOTE — PROGRESS NOTES
0800  Patient alert. Use white board to communicate better. 1140   Was able to talk to the [address] [place] [address], Lakhwinder Queen and gave her an update about patient's condition. POA worried about the long term plan. She'll come by tonight or sosa. Morning to talk to the doctors.

## 2020-09-27 NOTE — PROGRESS NOTES
[facility] [address] [facility] [address] [place] [address]   Office [phone]  Fax [phone]          Assessment and Plan   Ella Jaquez a 79 y.o. female with PMH of bilateral nephrolithiasis, hypokalemia, debility, dementia, and HTN who is admitted for Tib/Fib Fracture.     24 Hour Events: None      Closed nondisplaced Proximal Tibia and Fibula Fracture: s/p left tibia repair tibia/fibula fx 9/24. GLF from wheel chair per [facility]. - Ortho following, f/u in 2 wks outpatient  - Enoxaparin 40 mg daily   - Dilaudid 0.2 mg iv q4h prn  - Oxycodone 5mg po q4h prn   - COVID test Negative x2.      Sepsis 2/2 UTI: POA WBC 16.9. LA nml. Afebrile, HDS. S/p Ceftriaxone (2d), s/p levofloxacin 750mg IV daily (2d)  - Ucx possible enterococcus faecalis and providencia stuartii.   - Macrobid 100 mg bid (9/27)  - Cefdinir 300 mg q12h (9/27)  - Bcx 9/25 NG x 3 days   - Daily CBC and CMP                        Afib w/ RVR: POA EKG: NSR, occasional PVCs. 9/26 pt afib w/ RVR refractory to diltiazem, requiring amiodarone. Trop negx1. Mg and Phos: wnl. [id number] SCORE: 4 points. Amiodarone drip 0.5-1 mg/min was given (9/26)  - Cardiology c/s, appreciate recs      - Amiodarone 200 mg PO daily (9/28)      - Poor candidate for anticoagulation given dementia/ fall risk. - Monitor with telemetry  - Mag and Phos daily. - Anticoagulated with lovenox     Anemia: POA Hgb 13.3. BL Hgb   - Home Ferrous Sulfate 325 mg daily     Hypertension: POA RA 208/14. No home meds.    - Monitor BP and adjust as needed     Depression/Anxiety  - Home Lexapro 10mg po daily and Buspar 10mg po BID     Constipation: 2/2 opiate use. - Miralax daily     Dementia: No official diagnosis. No home meds. - Neuropsych testing outpt     Hard of Hearing: According to family pt responds to written text.      FEN/GI - Regular diet.   Activity - Bed rest, complete  DVT prophylaxis - Lovenox  GI prophylaxis - Not indicated at this time  Fall prophylaxis - Fall precautions ordered. Disposition Plan to d/c to Nursing Home. Consulting PT/OT/CM  Code Status - Full, discussed with patient / caregivers. Next of Kin Name and Jay Jay Esparza [phone]    Chanelle Taylor MD  Family Medicine Resident         Subjective / Objective     Subjective Pt nonverbal. She does open her eyes with touch and loud voice. Temp (24hrs), Av.3 °F (37.4 °C), Min:98.6 °F (37 °C), Max:100 °F (37.8 °C)     Objective  Respiratory: O2 Flow Rate (L/min): 2 l/min O2 Device: Nasal cannula   Visit Vitals  BP (!) 145/53 (BP 1 Location: Right arm, BP Patient Position: At rest)   Pulse 64   Temp 98.6 °F (37 °C)   Resp 19   Ht 5' 2\" (1.575 m)   Wt 137 lb 6.4 oz (62.3 kg)   SpO2 100%   BMI 25.13 kg/m²     General: Screaming. No alert, oriented, verbally responsive. Restraints/mittens B/L in place. Head: Normocephalic. Atraumatic. Respiratory: CTAB. No w/r/r/c.  Cardiovascular: Regular. Normal S1,S2. No m/r/g.   GI: Nondistended.+ bowel sounds. Nontender. No rebound tenderness or guarding. Extremities: LLE with boot. RLE Distal pulse present. Skin: Warm, dry. No rashes. Neuro: Nonverbal at baseline. Responds to touch and loud voice. I/O:  Date 20 - 20 - 20 0659   Shift 8442-8010 7881-4817 24 Hour Total 4214-8480 9161-6703 24 Hour Total   INTAKE   P.O. 1180  1180        P. O. 1180  1180      I. V.(mL/kg/hr) 2245.2(2.4)  2245.2(1.5)        I.V. 0  0        Volume (0.9% sodium chloride infusion) 2016.7  2016. 7        Volume (potassium phosphate 15 mmol in 0.9% sodium chloride 250 mL infusion) 78.5  78.5        Volume (levoFLOXacin (LEVAQUIN) 750 mg in D5W IVPB) 150  150      Blood 0  0        Autotransfused 0  0      Other 0  0        Other 0  0      Shift Total(mL/kg) 3425. 2(43.1)  3425. 2(55)      OUTPUT   Urine(mL/kg/hr) 500(0.5) 250(0.3) 750(0.5)        Urine Voided 500  500        Urine Occurrence(s)  1 x 1 x        Urine Output (mL) (External Female Catheter 09/27/20)  250 250      Shift Total(mL/kg) 500(6.3) 250(4) 750(12)      NET 2925.2 -250 2675.2      Weight (kg) 79.4 62.3 62.3 62.3 62.3 62.3       CBC:  Recent Labs     09/28/20  0701 09/27/20  0420 09/26/20  1100   WBC 9.2 11.1* 15.2*   HGB 9.4* 9.6* 11.4*   HCT 29.5* 30.7* 35.2    206 411       Metabolic Panel:  Recent Labs     09/27/20  0420 09/26/20  1127    144   K 4.0 4.1   * 109*   CO2 26 26   BUN 16 15   CREA 0.54* 0.52*   GLU 95 103*   CA 8.3* 8.4*   MG 1.9 2.1   PHOS 3.4 2.8          For Billing    Chief Complaint   Patient presents with   [facility] [place] Problems  Date Reviewed: 12/19/2019          Codes Class Noted POA    Acute cystitis with hematuria ICD-10-CM: N30.01  ICD-9-CM: 595.0  9/25/2020 Unknown        Sinus arrhythmia ICD-10-CM: I49.8  ICD-9-CM: 427.89  9/25/2020 Unknown        History of hypertension ICD-10-CM: Z86.79  ICD-9-CM: V12.59  9/25/2020 Unknown        * (Principal) Fracture of tibia and fibula ICD-10-CM: S82.209A, S82.409A  ICD-9-CM: 823.82  9/24/2020 Unknown        Anxiety ICD-10-CM: F41.9  ICD-9-CM: 300.00  9/24/2020 Unknown        Confusion ICD-10-CM: R41.0  ICD-9-CM: 298.9  1/2/2020 Yes        Hard of hearing (Chronic) ICD-10-CM: H91.90  ICD-9-CM: 389.9  12/19/2019 Yes        Dementia ([address] (Chronic) ICD-10-CM: F03.90  ICD-9-CM: 294.20  12/19/2019 Yes        Leukocytosis ICD-10-CM: D80.155  ICD-9-CM: 288.60  12/15/2019 Unknown        Atrial fibrillation with RVR ([facility] ICD-10-CM: I48.91  ICD-9-CM: 427.31  12/15/2019 Unknown

## 2020-09-27 NOTE — ROUTINE PROCESS
Bedside shift change report given to Brayden Grimes (oncoming nurse) by Edith Kilgore (offgoing nurse). Report included the following information SBAR, Kardex, Intake/Output, MAR, Accordion and Recent Results.

## 2020-09-27 NOTE — OP NOTES
OPERATIVE REPORT     Admit Date: 9/24/2020  Admit Diagnosis: Fracture of tibia and fibula [S82.209A, S82.409A]     Date of Procedure: 9/24/2020   Preoperative Diagnosis: LEFT DISTAL TIBIAL SHAFT  FRACTURE, LEFT PROXIMAL FIBULA FRACTURE, LEFT DISTAL ARTICULAR TIBIA FRACTURE  Postoperative Diagnosis: LEFT DISTAL TIBIAL SHAFT  FRACTURE, LEFT PROXIMAL FIBULA FRACTURE, LEFT DISTAL ARTICULAR TIBIA FRACTURE  Procedure:   1. LEFT TIBIA INTRA MEDULLARY NAIL (ANES CHOICE)  2. FIXATION DISTAL TIBIA ARTICULAR FRACTURE     Surgeon: Babak Monzon MD  Assistant(s): Hospital surgical assistant, Duane Ramming  Anesthesia: General   Estimated Blood Loss: 200cc  Specimens: * No specimens in log *   Complications: None        INDICATIONS:  Fatoumata Faustin is a patient with closed left distal tibia fracture with proximal fibula fracture as well as intra-articular extension. Patient is minimally ambulatory with advanced dementia but with patient's dementia and inability to comply with nonweightbearing of the extremity and concern for skin breakdown with long leg immobilization for prolonged period of time patient's family and power of  elected to move forward with surgery for left tibia intramedullary nailing.     The risks of surgery include but are not limited to complications with anesthesia including death, pain, bleeding, infection, damage to surrounding structures, numbness and tingling, wound healing problems, recurrence of symptoms, incomplete relief of pain, nonunion, malunion, hardware failure, painful hardware, the need for further surgery, DVT, and PE. The patient verbalized understanding and elected to proceed with surgical intervention.     The patient was seen for medical clearance.      PROCEDURE PERFORMED :   The patient was seen in the pre-operative holding area and the proper limb initialed. Questions were answered and the patient was taken to the operating room for anesthesia.  They were positioned on the table and the operative extremity was prepped and draped in a sterile fashion. The appropriate antibiotics were given and time-out was performed prior to the incision.      We first addressed the distal articular fracture. Using fluoroscopy we localized the start point for a a to P screw in the medial distal tibia medial to the tibialis anterior tendon. We incised the skin and then dissected down to the bone we then drilled and placed a 3.5 mm cortical screw with a washer secondary patient's bone quality obtaining good bite we then turned our attention to an additional articular fixation with a medial to lateral screw placed distal to the physis just distal to our other screw we again localized this with fluoroscopy and then incised the skin and dissected down to the bone bluntly we then placed a 4.0 cancellus screw.      Once we had adequate fixation that was confirmed on fluoroscopy without displacement of our fractures at the articular segment turned our attention to fixation of the tibia shaft.   We elected to use extension nailing so the start point was localized with fluoroscopy and then a 5 cm incision was made proximal to the patella dissecting down through the subcutaneous tissue to the extensor mechanism and quad tendon once this was isolated we then sharply transected the quad tendon in line with the fibers and then bluntly dissected into the suprapatellar pouch and behind the patella we then attempted to place our trochlear trocar but there was still not enough space in the trochlea so we extended our incision distally and took our incision via a small medial parapatellar incision once this was done we then placed our padded trocar in the appropriate position confirmed on fluoroscopy and then placed a guidewire  The guide-wire was passed and over reamed with the appropriate starting reamer.      The fracture was then confirmed to be adequately reduced and then we passed our guidewire confirming appropriate position distally in the distal metaphysis. the canal was reamed to the point of cortical chatter. The guide-wire was measured and the appropriate diameter and length nail was selected. The nail was placed over the guide-wire and past the fracture site. Subsequently the guide-wire was removed and final seating of the nail performed. Fluoroscopy was used to verify satisfactory reduction and two distal interlock screws were placed followed by two proximal interlock screws after backslapping to ensure compression in her fracture site. Final views were obtained to verify the reduction and screw / nail placement.      The ankle was then stressed under fluoro and noted to be stable no fixation of the fibula was felt to be necessary     The suprapatellar wound was copiously irrigated with normal saline and closed in layers with #1 strata fix 2-0 Vicryl and 3-0 in the dermis with Dermabond on the skin. . The incisions for the interlock screws were closed with 2-0 Vicryl and 3-0 Monocryl and Dermabond. A sterile dressing and patient was placed into a removable cam boot for wound checks. the patient then recovered from anesthesia and was taken to the post-operative holding area in a stable condition.      IMPLANTS :      Synthes nail 11 x 285 MM, 2 proximal and 2 distal interlocks, one 3.5 mm rectal screw and one 4.0 mm cancellus screw     Implant Name Type Inv.  Item Serial No.  Lot No. LRB No. Used Action   SCREW BNE L35MM DIA4MM CANC S STL ST TARYN NONLOCKING FULL - SNA   SCREW BNE L35MM DIA4MM CANC S STL ST TARYN NONLOCKING FULL NA [employer] NA Left 1 Implanted   SCREW BNE L36MM DIA3.5MM SAMM S STL ST NONCANNULATED PRITI - SNA   SCREW BNE L36MM DIA3.5MM SAMM S STL ST NONCANNULATED PRITI NA [employer]Y [employer] NA Left 1 Implanted   NAIL IM [id number] [id number] UNIV TIB LT GRN TI TARYN PRITI FLUT - SNA   NAIL IM [id number] [id number] UNIV TIB LT GRN TI TARYN PRITI FLUT NA DEPU[employer] [id number] Left 1 [facility]        Merlyn Cortes MD  Cell [phone]

## 2020-09-27 NOTE — ROUTINE PROCESS
Bedside shift change report given to Alissa (oncoming nurse) by Ean Barnes (offgoing nurse). Report included the following information SBAR, Kardex, Intake/Output, MAR, Accordion, Recent Results and Med Rec Status.

## 2020-09-27 NOTE — CONSULTS
HISTORY OF PRESENTING ILLNESS      Valentin Hatfield is a 79 y.o. female with tibia/fibula fracture and planned for surgery. She has advanced dementia. Atrial fibrillation with RVR with borderline SBP (106 mm Hg) was noted and she was started on amiodarone gtt and IV fluids were administered. BP has since improved (147/51).         ACTIVE PROBLEM LIST     Patient Active Problem List    Diagnosis Date Noted    Acute cystitis with hematuria 09/25/2020    Sinus arrhythmia 09/25/2020    History of hypertension 09/25/2020    Fracture of tibia and fibula 09/24/2020    Anxiety 09/24/2020    Confusion 01/02/2020    Hard of hearing 12/19/2019    Dementia ([facility] [address] 12/19/2019    Debility 12/19/2019    Hypokalemia due to loss of potassium 12/16/2019    Leukocytosis 12/15/2019    Atrial fibrillation with RVR ([facility] [address] 12/15/2019    Hydronephrosis of right kidney 12/15/2019    Bilateral kidney stones 12/15/2019           MEDICATIONS     Current Facility-Administered Medications   Medication Dose Route Frequency    levoFLOXacin (LEVAQUIN) 750 mg in D5W IVPB  750 mg IntraVENous Q24H    amiodarone (CORDARONE) 375 mg in dextrose 5% 250 mL infusion  0.5-1 mg/min IntraVENous TITRATE    0.9% sodium chloride infusion  100 mL/hr IntraVENous CONTINUOUS    LORazepam (ATIVAN) injection 0.5 mg  0.5 mg IntraVENous Q8H PRN    phenol throat spray (CHLORASEPTIC) 1 Spray  1 Spray Oral PRN    naloxone (NARCAN) injection 0.4 mg  0.4 mg IntraVENous PRN    ondansetron (ZOFRAN) injection 4 mg  4 mg IntraVENous Q4H PRN    oxyCODONE IR (ROXICODONE) tablet 5 mg  5 mg Oral Q4H PRN    enoxaparin (LOVENOX) injection 40 mg  40 mg SubCUTAneous Q24H    melatonin tablet 3 mg  3 mg Oral QHS    HYDROmorphone (DILAUDID) syringe 0.2 mg  0.2 mg IntraVENous Q4H PRN    sodium chloride (NS) flush 5-40 mL  5-40 mL IntraVENous Q8H    sodium chloride (NS) flush 5-40 mL  5-40 mL IntraVENous PRN    busPIRone (BUSPAR) tablet 10 mg  10 mg Oral BID  escitalopram oxalate (LEXAPRO) tablet 10 mg  10 mg Oral DAILY    ferrous sulfate tablet 325 mg  325 mg Oral DAILY    ketotifen (ZADITOR) 0.025 % (0.035 %) ophthalmic solution 1 Drop  1 Drop Both Eyes BID PRN    polyethylene glycol (MIRALAX) packet 17 g  17 g Oral DAILY           PAST MEDICAL HISTORY     Past Medical History:   Diagnosis Date    Arthritis     Chronic kidney disease     kidney stones    Chronic pain     right hip    HTN (hypertension) 12/16/2019    Hypertension     Ill-defined condition     chronic diarrhea    Weak 12/19/2019    Weakness 1/2/2020           PAST SURGICAL HISTORY     Past Surgical History:   Procedure Laterality Date    HX APPENDECTOMY      HX ORTHOPAEDIC      bilateral carpal tunnel    HX TONSILLECTOMY            ALLERGIES     Allergies   Allergen Reactions    Egg Unknown (comments)     Patient stated she is allergic. Unable to describe reaction type.      Pcn [Penicillins] Itching    Sulfa (Sulfonamide Antibiotics) Itching          FAMILY HISTORY     Family History   Problem Relation Age of Onset    Cancer Father     negative for cardiac disease       SOCIAL HISTORY     Social History     Socioeconomic History    Marital status:      Spouse name: Not on file    Number of children: Not on file    Years of education: Not on file    Highest education level: Not on file   Tobacco Use    Smoking status: Never Smoker    Smokeless tobacco: Never Used   Substance and Sexual Activity    Alcohol use: No    Drug use: No         MEDICATIONS     Current Facility-Administered Medications   Medication Dose Route Frequency    levoFLOXacin (LEVAQUIN) 750 mg in D5W IVPB  750 mg IntraVENous Q24H    amiodarone (CORDARONE) 375 mg in dextrose 5% 250 mL infusion  0.5-1 mg/min IntraVENous TITRATE    0.9% sodium chloride infusion  100 mL/hr IntraVENous CONTINUOUS    LORazepam (ATIVAN) injection 0.5 mg  0.5 mg IntraVENous Q8H PRN    phenol throat spray (CHLORASEPTIC) 1 Spray  1 Spray Oral PRN    naloxone (NARCAN) injection 0.4 mg  0.4 mg IntraVENous PRN    ondansetron (ZOFRAN) injection 4 mg  4 mg IntraVENous Q4H PRN    oxyCODONE IR (ROXICODONE) tablet 5 mg  5 mg Oral Q4H PRN    enoxaparin (LOVENOX) injection 40 mg  40 mg SubCUTAneous Q24H    melatonin tablet 3 mg  3 mg Oral QHS    HYDROmorphone (DILAUDID) syringe 0.2 mg  0.2 mg IntraVENous Q4H PRN    sodium chloride (NS) flush 5-40 mL  5-40 mL IntraVENous Q8H    sodium chloride (NS) flush 5-40 mL  5-40 mL IntraVENous PRN    busPIRone (BUSPAR) tablet 10 mg  10 mg Oral BID    escitalopram oxalate (LEXAPRO) tablet 10 mg  10 mg Oral DAILY    ferrous sulfate tablet 325 mg  325 mg Oral DAILY    ketotifen (ZADITOR) 0.025 % (0.035 %) ophthalmic solution 1 Drop  1 Drop Both Eyes BID PRN    polyethylene glycol (MIRALAX) packet 17 g  17 g Oral DAILY       I have reviewed the nurses notes, vitals, problem list, allergy list, medical history, family, social history and medications. REVIEW OF SYMPTOMS      General: Advanced dementia       PHYSICAL EXAMINATION      Vitals:    09/27/20 0700 09/27/20 0703 09/27/20 0817 09/27/20 0907   BP: (!) 106/44  (!) 145/56 (!) 147/51   Pulse: 68 70  64   Resp: 17   19   Temp:    99.2 °F (37.3 °C)   SpO2: 95%   100%   Weight:       Height:         General: Awake; noncommunicative  HEENT: No jaundice, oral mucosa moist, no oral ulcers  Neck: Supple, no stiffness, no lymphadenopathy, supple  Heart:  Normal S1/S2 negative S3 or S4. Regular, no murmur, gallop or rub, no jugular venous distention  Respiratory: Clear bilaterally x 4, no wheezing or rales  Abdomen:   Soft, non-tender, bowel sounds are active.   Extremities:  No edema, normal cap refill, no cyanosis. Musculoskeletal: No clubbing, no deformities  Neuro: A&Ox3, speech clear, gait stable, cooperative, no focal neurologic deficits  Skin: Skin color is normal. No rashes or lesions.  Non diaphoretic, moist.  Vascular: 2+ pulses symmetric in all extremities       DIAGNOSTIC DATA      TELEMETRY: sinus rhythm; AF; PACs/PVCs       LABORATORY DATA      Lab Results   Component Value Date/Time    WBC 11.1 (H) 09/27/2020 04:20 AM    HGB 9.6 (L) 09/27/2020 04:20 AM    HCT 30.7 (L) 09/27/2020 04:20 AM    PLATELET 129 79/80/9254 04:20 AM    MCV 98.7 09/27/2020 04:20 AM      Lab Results   Component Value Date/Time    Sodium 144 09/27/2020 04:20 AM    Potassium 4.0 09/27/2020 04:20 AM    Chloride 112 (H) 09/27/2020 04:20 AM    CO2 26 09/27/2020 04:20 AM    Anion gap 6 09/27/2020 04:20 AM    Glucose 95 09/27/2020 04:20 AM    BUN 16 09/27/2020 04:20 AM    Creatinine 0.54 (L) 09/27/2020 04:20 AM    BUN/Creatinine ratio 30 (H) 09/27/2020 04:20 AM    GFR est AA >60 09/27/2020 04:20 AM    GFR est non-AA >60 09/27/2020 04:20 AM    Calcium 8.3 (L) 09/27/2020 04:20 AM    Bilirubin, total 0.5 12/18/2019 11:32 PM    Alk. phosphatase 92 12/18/2019 11:32 PM    Protein, total 6.1 (L) 12/18/2019 11:32 PM    Albumin 2.6 (L) 12/18/2019 11:32 PM    Globulin 3.5 12/18/2019 11:32 PM    A-G Ratio 0.7 (L) 12/18/2019 11:32 PM    ALT (SGPT) 18 12/18/2019 11:32 PM           ASSESSMENT      1. Atrial fibrillation  2. Dementia, advanced  3. UTI/sepsis  4. Tibia/fibula fracture  5. Hypertension  6. Anemia       PLAN     DC amiodarone gtt; start oral amiodarone 200 mg daily. Poor candidate for anticoagulation given dementia/fall risk.            Delmy Brand MD  Cardiac Electrophysiology / Cardiology    [address] [facility] [address]  [address] [facility] [facility], [place] [address]    [place] [place]  [phone] / [phone] Fax   [phone] / [phone] Fax

## 2020-09-28 ENCOUNTER — TELEPHONE (OUTPATIENT)
Dept: CARDIOLOGY CLINIC | Age: 80
End: 2020-09-28

## 2020-09-28 VITALS
HEIGHT: 62 IN | TEMPERATURE: 99.2 F | BODY MASS INDEX: 25.28 KG/M2 | DIASTOLIC BLOOD PRESSURE: 50 MMHG | RESPIRATION RATE: 21 BRPM | WEIGHT: 137.4 LBS | SYSTOLIC BLOOD PRESSURE: 135 MMHG | HEART RATE: 62 BPM | OXYGEN SATURATION: 97 %

## 2020-09-28 LAB
ANION GAP SERPL CALC-SCNC: 6 MMOL/L (ref 5–15)
BASOPHILS # BLD: 0 K/UL (ref 0–0.1)
BASOPHILS NFR BLD: 0 % (ref 0–1)
BUN SERPL-MCNC: 12 MG/DL (ref 6–20)
BUN/CREAT SERPL: 26 (ref 12–20)
CALCIUM SERPL-MCNC: 8.2 MG/DL (ref 8.5–10.1)
CHLORIDE SERPL-SCNC: 111 MMOL/L (ref 97–108)
CO2 SERPL-SCNC: 25 MMOL/L (ref 21–32)
CREAT SERPL-MCNC: 0.47 MG/DL (ref 0.55–1.02)
DIFFERENTIAL METHOD BLD: ABNORMAL
EOSINOPHIL # BLD: 0.5 K/UL (ref 0–0.4)
EOSINOPHIL NFR BLD: 5 % (ref 0–7)
ERYTHROCYTE [DISTWIDTH] IN BLOOD BY AUTOMATED COUNT: 13.3 % (ref 11.5–14.5)
GLUCOSE SERPL-MCNC: 93 MG/DL (ref 65–100)
HCT VFR BLD AUTO: 29.5 % (ref 35–47)
HGB BLD-MCNC: 9.4 G/DL (ref 11.5–16)
IMM GRANULOCYTES # BLD AUTO: 0 K/UL (ref 0–0.04)
IMM GRANULOCYTES NFR BLD AUTO: 0 % (ref 0–0.5)
LYMPHOCYTES # BLD: 1.4 K/UL (ref 0.8–3.5)
LYMPHOCYTES NFR BLD: 16 % (ref 12–49)
MAGNESIUM SERPL-MCNC: 1.8 MG/DL (ref 1.6–2.4)
MCH RBC QN AUTO: 30.7 PG (ref 26–34)
MCHC RBC AUTO-ENTMCNC: 31.9 G/DL (ref 30–36.5)
MCV RBC AUTO: 96.4 FL (ref 80–99)
MONOCYTES # BLD: 1 K/UL (ref 0–1)
MONOCYTES NFR BLD: 11 % (ref 5–13)
NEUTS SEG # BLD: 6.3 K/UL (ref 1.8–8)
NEUTS SEG NFR BLD: 68 % (ref 32–75)
NRBC # BLD: 0 K/UL (ref 0–0.01)
NRBC BLD-RTO: 0 PER 100 WBC
PHOSPHATE SERPL-MCNC: 2.8 MG/DL (ref 2.6–4.7)
PLATELET # BLD AUTO: 204 K/UL (ref 150–400)
PMV BLD AUTO: 10.3 FL (ref 8.9–12.9)
POTASSIUM SERPL-SCNC: 4 MMOL/L (ref 3.5–5.1)
RBC # BLD AUTO: 3.06 M/UL (ref 3.8–5.2)
SODIUM SERPL-SCNC: 142 MMOL/L (ref 136–145)
WBC # BLD AUTO: 9.2 K/UL (ref 3.6–11)

## 2020-09-28 PROCEDURE — 77030038269 HC DRN EXT URIN PURWCK BARD -A

## 2020-09-28 PROCEDURE — 90471 IMMUNIZATION ADMIN: CPT

## 2020-09-28 PROCEDURE — 74011250637 HC RX REV CODE- 250/637: Performed by: NURSE PRACTITIONER

## 2020-09-28 PROCEDURE — 74011250636 HC RX REV CODE- 250/636: Performed by: STUDENT IN AN ORGANIZED HEALTH CARE EDUCATION/TRAINING PROGRAM

## 2020-09-28 PROCEDURE — 74011250636 HC RX REV CODE- 250/636: Performed by: ORTHOPAEDIC SURGERY

## 2020-09-28 PROCEDURE — 74011250637 HC RX REV CODE- 250/637: Performed by: INTERNAL MEDICINE

## 2020-09-28 PROCEDURE — 99231 SBSQ HOSP IP/OBS SF/LOW 25: CPT | Performed by: NURSE PRACTITIONER

## 2020-09-28 PROCEDURE — 74011250636 HC RX REV CODE- 250/636: Performed by: FAMILY MEDICINE

## 2020-09-28 PROCEDURE — 90686 IIV4 VACC NO PRSV 0.5 ML IM: CPT | Performed by: FAMILY MEDICINE

## 2020-09-28 PROCEDURE — 74011250637 HC RX REV CODE- 250/637: Performed by: STUDENT IN AN ORGANIZED HEALTH CARE EDUCATION/TRAINING PROGRAM

## 2020-09-28 PROCEDURE — 92610 EVALUATE SWALLOWING FUNCTION: CPT

## 2020-09-28 PROCEDURE — 85025 COMPLETE CBC W/AUTO DIFF WBC: CPT

## 2020-09-28 PROCEDURE — 99238 HOSP IP/OBS DSCHRG MGMT 30/<: CPT | Performed by: FAMILY MEDICINE

## 2020-09-28 PROCEDURE — 83735 ASSAY OF MAGNESIUM: CPT

## 2020-09-28 PROCEDURE — 84100 ASSAY OF PHOSPHORUS: CPT

## 2020-09-28 PROCEDURE — 36415 COLL VENOUS BLD VENIPUNCTURE: CPT

## 2020-09-28 PROCEDURE — 80048 BASIC METABOLIC PNL TOTAL CA: CPT

## 2020-09-28 RX ORDER — METOPROLOL TARTRATE 25 MG/1
12.5 TABLET, FILM COATED ORAL 2 TIMES DAILY
Qty: 30 TAB | Refills: 0 | Status: SHIPPED | OUTPATIENT
Start: 2020-09-28 | End: 2020-11-27 | Stop reason: ALTCHOICE

## 2020-09-28 RX ORDER — OXYCODONE AND ACETAMINOPHEN 5; 325 MG/1; MG/1
1 TABLET ORAL
Qty: 20 TAB | Refills: 0 | Status: SHIPPED | OUTPATIENT
Start: 2020-09-28 | End: 2020-10-01

## 2020-09-28 RX ORDER — AMIODARONE HYDROCHLORIDE 200 MG/1
200 TABLET ORAL DAILY
Qty: 30 TAB | Refills: 0 | Status: SHIPPED | OUTPATIENT
Start: 2020-09-29

## 2020-09-28 RX ORDER — CEFDINIR 300 MG/1
300 CAPSULE ORAL 2 TIMES DAILY
Qty: 8 CAP | Refills: 0 | Status: SHIPPED | OUTPATIENT
Start: 2020-09-28 | End: 2020-10-02

## 2020-09-28 RX ORDER — NITROFURANTOIN 25; 75 MG/1; MG/1
100 CAPSULE ORAL 2 TIMES DAILY
Qty: 8 CAP | Refills: 0 | Status: SHIPPED | OUTPATIENT
Start: 2020-09-28 | End: 2020-10-02

## 2020-09-28 RX ORDER — METOPROLOL TARTRATE 25 MG/1
12.5 TABLET, FILM COATED ORAL 2 TIMES DAILY
Status: DISCONTINUED | OUTPATIENT
Start: 2020-09-28 | End: 2020-09-28 | Stop reason: HOSPADM

## 2020-09-28 RX ADMIN — ENOXAPARIN SODIUM 40 MG: 40 INJECTION SUBCUTANEOUS at 10:36

## 2020-09-28 RX ADMIN — AMIODARONE HYDROCHLORIDE 200 MG: 200 TABLET ORAL at 10:40

## 2020-09-28 RX ADMIN — NITROFURANTOIN MONOHYDRATE/MACROCRYSTALLINE 100 MG: 25; 75 CAPSULE ORAL at 10:40

## 2020-09-28 RX ADMIN — POLYETHYLENE GLYCOL 3350 17 G: 17 POWDER, FOR SOLUTION ORAL at 10:40

## 2020-09-28 RX ADMIN — BUSPIRONE HYDROCHLORIDE 10 MG: 5 TABLET ORAL at 10:40

## 2020-09-28 RX ADMIN — FERROUS SULFATE TAB 325 MG (65 MG ELEMENTAL FE) 325 MG: 325 (65 FE) TAB at 10:40

## 2020-09-28 RX ADMIN — CEFDINIR 300 MG: 300 CAPSULE ORAL at 10:40

## 2020-09-28 RX ADMIN — METOPROLOL TARTRATE 12.5 MG: 25 TABLET, FILM COATED ORAL at 10:40

## 2020-09-28 RX ADMIN — LORAZEPAM 0.5 MG: 2 INJECTION INTRAMUSCULAR; INTRAVENOUS at 00:40

## 2020-09-28 RX ADMIN — Medication 10 ML: at 06:49

## 2020-09-28 RX ADMIN — ESCITALOPRAM OXALATE 10 MG: 10 TABLET ORAL at 10:40

## 2020-09-28 RX ADMIN — INFLUENZA VIRUS VACCINE 0.5 ML: 15; 15; 15; 15 SUSPENSION INTRAMUSCULAR at 14:01

## 2020-09-28 RX ADMIN — HYDROMORPHONE HYDROCHLORIDE 0.2 MG: 1 INJECTION, SOLUTION INTRAMUSCULAR; INTRAVENOUS; SUBCUTANEOUS at 10:37

## 2020-09-28 RX ADMIN — HYDROMORPHONE HYDROCHLORIDE 0.2 MG: 1 INJECTION, SOLUTION INTRAMUSCULAR; INTRAVENOUS; SUBCUTANEOUS at 04:49

## 2020-09-28 NOTE — PROGRESS NOTES
Problem: Falls - Risk of  Goal: *Absence of Falls  Description: Document Shell Chavez Fall Risk and appropriate interventions in the flowsheet. Outcome: Resolved/Met  Note: Fall Risk Interventions:  Mobility Interventions: Bed/chair exit alarm, Patient to call before getting OOB    Mentation Interventions: Bed/chair exit alarm    Medication Interventions: Bed/chair exit alarm, Patient to call before getting OOB, Teach patient to arise slowly    Elimination Interventions: Bed/chair exit alarm, Call light in reach, Toileting schedule/hourly rounds    History of Falls Interventions: Bed/chair exit alarm, Room close to nurse's station         Problem: Patient Education: Go to Patient Education Activity  Goal: Patient/Family Education  Outcome: Resolved/Met     Problem: Pressure Injury - Risk of  Goal: *Prevention of pressure injury  Description: Document David Scale and appropriate interventions in the flowsheet. Outcome: Resolved/Met  Note: Pressure Injury Interventions:  Sensory Interventions: Assess changes in LOC, Keep linens dry and wrinkle-free, Float heels, Maintain/enhance activity level, Minimize linen layers    Moisture Interventions: Absorbent underpads, Internal/External urinary devices, Maintain skin hydration (lotion/cream), Minimize layers    Activity Interventions: Increase time out of bed    Mobility Interventions: Turn and reposition approx.  every two hours(pillow and wedges), HOB 30 degrees or less    Nutrition Interventions: Document food/fluid/supplement intake    Friction and Shear Interventions: Apply protective barrier, creams and emollients, HOB 30 degrees or less, Minimize layers                Problem: Patient Education: Go to Patient Education Activity  Goal: Patient/Family Education  Outcome: Resolved/Met     Problem: Non-Violent Restraints  Goal: *Removal from restraints as soon as assessed to be safe  Outcome: Resolved/Met  Goal: *No harm/injury to patient while restraints in use  Outcome: Resolved/Met  Goal: *Patient's dignity will be maintained  Outcome: Resolved/Met  Goal: *Patient Specific Goal (EDIT GOAL, INSERT TEXT)  Outcome: Resolved/Met  Goal: Non-violent Restaints:Standard Interventions  Outcome: Resolved/Met  Goal: Non-violent Restraints:Patient Interventions  Outcome: Resolved/Met  Goal: Patient/Family Education  Outcome: Resolved/Met     Problem: Patient Education: Go to Patient Education Activity  Goal: Patient/Family Education  Outcome: Resolved/Met     Problem: Patient Education: Go to Patient Education Activity  Goal: Patient/Family Education  Outcome: Resolved/Met     Problem: Patient Education: Go to Patient Education Activity  Goal: Patient/Family Education  Outcome: Resolved/Met

## 2020-09-28 NOTE — DISCHARGE INSTRUCTIONS
[facility] - [facility] / Derrick Webster / [id number] : 1940    Admission date: 2020 Discharge date: 2020       Primary care provider: Zaida Carpenter MD    Discharging provider:  Cassidy Pulido MD  - Family Medicine Resident  Lennox Rider, MD - Attending, Family Medicine   . . . . . . . . . . . . . . . . . . . . . . . . . . . . . . . . . . . . . . . . . . . . . . . . . . . . . . . . . . . . . . . . . . . . . . . [address] COURSE:  COVID test Negative x2.     Sepsis 2/2 UTI: Ucx: enterococcus species and providencia stuartii. BCx: Neg 4 days. - Continue Cefdinir 300 bid x 4 days. - Continue Macrobid 100 mg bid x 4 days.  - Follow up with PCP. Closed nondisplaced Proximal Tibia and Fibula Fracture: s/p left tibia repair tibia/fibula fx .   - Percocet 5-325mg every 4 hours prn for severe pain  - Tylenol 625mg every 8 hours prn for pain  - Ortho following, f/u in 10 days outpatient.                     New onset Afib w/ RVR: POA EKG: NSR, occasional PVCs. Found to have new onset Afib w/ RVR (20), s/p amiodarone gtt. - Follow up with Cardiology   - Continue Metoprolol 12.5 mg bid. - Continue Amiodarone 200 mg daily.       Anemia: POA Hgb 13.3. Hgb 9.4 on day of discharge (stable s/p surgery). - Continue home Ferrous Sulfate 325 mg daily     Hypertension: No home meds.    - Daily BP checks at [facility]     Depression/Anxiety. - Continue Home Lexapro 10 mg po daily and Buspar 10mg po BID.     Constipation: 2/2 opiate use.   - Continue Miralax daily     Dementia: No official diagnosis. No home meds.     Hard of Hearing: According to family pt responds to written text.       FOLLOW-UP CARE RECOMMENDATIONS:  Follow-up Information     Follow up With Specialties Details Why Contact Info    Cuca Fairchild PA-C Physician Assistant Schedule an appointment as soon as possible for a visit For 2 week follow up Rimma Sigala [address] [facility]  [phone]         Follow-up: 10 days with [facility] with Sarah Wade call [phone] for scheduling. It is very important that you keep follow-up appointment(s). Bring discharge papers, medication list (and/or medication bottles) to follow-up appointments for review by outpatient provider(s). FOLLOW-UP TESTS RECOMMENDED:   None     PENDING TEST RESULTS: NONE  At the time of discharge the following test results are still pending: None  Please review these results as they become available. Specific symptoms to watch for: chest pain, shortness of breath, fever, chills, nausea, vomiting, diarrhea, change in mentation, falling, weakness, bleeding. DIET:  Regular Diet. Thins. ACTIVITY:  Activity as tolerated w/ assistance. GOALS OF CARE:    Eventual return to home/independent/assisted living   x  Long term SNF      Hospice     No rehospitalization     Patient condition at discharge:   Functional status  x  Poor      Deconditioned      Independent   Cognition    Lucid     Forgetful (some sensescence)   x  Dementia   Catheters/lines (plus indication)    Kam     PICC      PEG    x  None   Code status  x  Full code      DNR    . . . . . . . . . . . . . . . . . . . . . . . . . . . . . . . . . . . . . . . . . . . . . . . . . . . . . . . . . . . . . . . . . . . . . . . Odalys Guardado      CHRONIC MEDICAL CONDITIONS:  Problem List as of 9/28/2020 Date Reviewed: 12/19/2019          Codes Class Noted - Resolved    Acute cystitis with hematuria ICD-10-CM: N30.01  ICD-9-CM: 595.0  9/25/2020 - Present        Sinus arrhythmia ICD-10-CM: I49.8  ICD-9-CM: 427.89  9/25/2020 - Present        History of hypertension ICD-10-CM: Z86.79  ICD-9-CM: V12.59  9/25/2020 - Present        * (Principal) Fracture of tibia and fibula ICD-10-CM: S82.209A, S82.409A  ICD-9-CM: 823.82  9/24/2020 - Present        Anxiety ICD-10-CM: F41.9  ICD-9-CM: 300.00  9/24/2020 - Present        Confusion ICD-10-CM: R41.0  ICD-9-CM: 298.9  1/2/2020 - Present        Hard of hearing (Chronic) ICD-10-CM: H91.90  ICD-9-CM: 389.9  12/19/2019 - Present        Dementia ([address] (Chronic) ICD-10-CM: F03.90  ICD-9-CM: 294.20  12/19/2019 - Present        Debility (Chronic) ICD-10-CM: R53.81  ICD-9-CM: 799.3  12/19/2019 - Present        Hypokalemia due to loss of potassium ICD-10-CM: E87.6  ICD-9-CM: 276.8  12/16/2019 - Present        Leukocytosis ICD-10-CM: D72.829  ICD-9-CM: 288.60  12/15/2019 - Present        Atrial fibrillation with RVR (HCC) ICD-10-CM: I48.91  ICD-9-CM: 427.31  12/15/2019 - Present        Hydronephrosis of right kidney ICD-10-CM: N13.30  ICD-9-CM: 231  12/15/2019 - Present        Bilateral kidney stones (Chronic) ICD-10-CM: N20.0  ICD-9-CM: 592.0  12/15/2019 - Present        RESOLVED: Weakness ICD-10-CM: R53.1  ICD-9-CM: 780.79  1/2/2020 - 9/24/2020        RESOLVED: Weak ICD-10-CM: R53.1  ICD-9-CM: 780.79  12/19/2019 - 9/24/2020        RESOLVED: Pneumonia ICD-10-CM: J18.9  ICD-9-CM: 486  12/19/2019 - 12/25/2019        RESOLVED: HTN (hypertension) (Chronic) ICD-10-CM: I10  ICD-9-CM: 401.9  12/16/2019 - 9/25/2020            [facility]/ED Visit Follow-Up Instructions/Information    You may have an in [facility] or may wish to contact [facility] to set-up a visit:    What are we? [facility] is an in-home urgent care service staffed with emergency trained medical teams. We come to your home in a vehicle stocked with medical supplies and technology. An ER physician is always available if needed. When? As a part of your hospital follow-up, an appointment has been/ or can be set up for us to come see you. Usually, this will be 24-72 hours after you leave the hospital or as needed. [facility] is open 7am-9pm, 7 days a week, 365 days a year, including holidays. Why?   We know that you cannot always get to your doctor after being in the [facility] and that your doctor is not always available when you need them. Once your workup is complete, we'll call in your prescriptions, update your family doctor, and handle billing with your insurance so you can focus on feeling better, faster without leaving home. How much? We accept most major health insurance plans, including Medicaid, Medicare, and Medicare Advantage [address] [facility] [employer], [employer], and [employer]. We also accept: credit, debit, health savings account (HSA), health reimbursement account (HRA) and flexible spending account (FSA) payments. [employer]'s prices compare to conventional urgent care facilities, but we bring the care to you. How to reach us? Getting care is easy- use our mobile january ([address]), website ([email or web address]) or call us [phone]. Information obtained by :   I understand that if any problems occur once I am at home I am to contact my physician. I understand and acknowledge receipt of the instructions indicated above.                                                                                                                                              Physician's or R.N.'s Signature                                                                  Date/Time                                                                                                                                              Patient or Representative Signature                                                          Date/Time

## 2020-09-28 NOTE — PROGRESS NOTES
11:44 AM  09/28/20     The patient has been discharged back to [facility]. Medicals and discharge orders sent through [employer] to Tera Hendricks at [facility] awaiting a time to arrange transport .     Josy Brown RN CCM

## 2020-09-28 NOTE — PROGRESS NOTES
1:34 PM  09/28/20     [facility] to pick patient up to transport to [facility]  @ 2:30 PM  Please call report to [phone] or [phone]. She will going to Room 503P. Transportation envelop on chart .     Caryl Rosenthal RN CCM

## 2020-09-28 NOTE — PROGRESS NOTES
Problem: Falls - Risk of  Goal: *Absence of Falls  Description: Document Lucie Crespo Fall Risk and appropriate interventions in the flowsheet. Outcome: Progressing Towards Goal  Note: Fall Risk Interventions:  Mobility Interventions: Bed/chair exit alarm, OT consult for ADLs, Patient to call before getting OOB, PT Consult for mobility concerns    Mentation Interventions: Bed/chair exit alarm, More frequent rounding, Reorient patient    Medication Interventions: Assess postural VS orthostatic hypotension, Teach patient to arise slowly, Evaluate medications/consider consulting pharmacy, Bed/chair exit alarm    Elimination Interventions: Bed/chair exit alarm, Call light in reach, Toileting schedule/hourly rounds    History of Falls Interventions: Bed/chair exit alarm, Investigate reason for fall, Room close to nurse's station, Vital signs minimum Q4HRs X 24 hrs (comment for end date)         Problem: Patient Education: Go to Patient Education Activity  Goal: Patient/Family Education  Outcome: Progressing Towards Goal     Problem: Pressure Injury - Risk of  Goal: *Prevention of pressure injury  Description: Document David Scale and appropriate interventions in the flowsheet. Outcome: Progressing Towards Goal  Note: Pressure Injury Interventions:  Sensory Interventions: Assess changes in LOC, Check visual cues for pain, Discuss PT/OT consult with provider, Float heels, Keep linens dry and wrinkle-free, Minimize linen layers, Turn and reposition approx. every two hours (pillows and wedges if needed)    Moisture Interventions: Absorbent underpads, Internal/External urinary devices, Minimize layers, Maintain skin hydration (lotion/cream)    Activity Interventions: Increase time out of bed, Pressure redistribution bed/mattress(bed type)    Mobility Interventions: Pressure redistribution bed/mattress (bed type), PT/OT evaluation, Turn and reposition approx.  every two hours(pillow and wedges)    Nutrition Interventions: Document food/fluid/supplement intake    Friction and Shear Interventions: Minimize layers                Problem: Patient Education: Go to Patient Education Activity  Goal: Patient/Family Education  Outcome: Progressing Towards Goal     Problem: Non-Violent Restraints  Goal: *Removal from restraints as soon as assessed to be safe  Outcome: Progressing Towards Goal  Goal: *No harm/injury to patient while restraints in use  Outcome: Progressing Towards Goal  Goal: *Patient's dignity will be maintained  Outcome: Progressing Towards Goal  Goal: *Patient Specific Goal (EDIT GOAL, INSERT TEXT)  Outcome: Progressing Towards Goal  Goal: Non-violent Restaints:Standard Interventions  Outcome: Progressing Towards Goal  Goal: Non-violent Restraints:Patient Interventions  Outcome: Progressing Towards Goal  Goal: Patient/Family Education  Outcome: Progressing Towards Goal     Problem: Patient Education: Go to Patient Education Activity  Goal: Patient/Family Education  Outcome: Progressing Towards Goal     Problem: Patient Education: Go to Patient Education Activity  Goal: Patient/Family Education  Outcome: Progressing Towards Goal

## 2020-09-28 NOTE — PROGRESS NOTES
Cardiology Progress Note                              [address] [facility], [place], [address] [facility]                                 Phone [phone]; Fax [phone]        2020 9:29 AM     Admit Date:           2020  Admit Diagnosis:  Fracture of tibia and fibula [S82.209A, S82.409A]  :          1940   MRN:          [id number]       Impression Plan/Recommendation   1. PAF  2. S/p frx of tibia and fibula  3. Anemia   4. Hypertension                · Remains in NSR, continue amiodarone  · Start low dose BB for BP  · Poor candidate for anticoagulation given dementia/fall risk. No further cardiac testing, will set up appt w Dr Mac Sanchez             No intake/output data recorded. Last 3 Recorded Weights in this Encounter    20 1051 20 2150 20 0518   Weight: 175 lb (79.4 kg) 138 lb 9.6 oz (62.9 kg) 137 lb 6.4 oz (62.3 kg)          1901 -  0700  In: 3425.2 [P.O.:1180;  I.V.:2245.2]  Out: 750 [Urine:750]    SUBJECTIVE               Lazaro Anabaptist not obtained d/t patient condition       Current Facility-Administered Medications   Medication Dose Route Frequency    amiodarone (CORDARONE) tablet 200 mg  200 mg Oral DAILY    cefdinir (OMNICEF) capsule 300 mg  300 mg Oral Q12H    nitrofurantoin (macrocrystal-monohydrate) (MACROBID) capsule 100 mg  100 mg Oral BID    influenza vaccine  (6 mos+)(PF) (FLUARIX/FLULAVAL/FLUZONE QUAD) injection 0.5 mL  0.5 mL IntraMUSCular PRIOR TO DISCHARGE    amiodarone (CORDARONE) 375 mg in dextrose 5% 250 mL infusion  0.5-1 mg/min IntraVENous TITRATE    0.9% sodium chloride infusion  50 mL/hr IntraVENous CONTINUOUS    LORazepam (ATIVAN) injection 0.5 mg  0.5 mg IntraVENous Q8H PRN    phenol throat spray (CHLORASEPTIC) 1 Spray  1 Spray Oral PRN    naloxone (NARCAN) injection 0.4 mg  0.4 mg IntraVENous PRN    ondansetron [facility]) injection 4 mg  4 mg IntraVENous Q4H PRN    oxyCODONE IR (ROXICODONE) tablet 5 mg  5 mg Oral Q4H PRN    enoxaparin (LOVENOX) injection 40 mg  40 mg SubCUTAneous Q24H    melatonin tablet 3 mg  3 mg Oral QHS    HYDROmorphone (DILAUDID) syringe 0.2 mg  0.2 mg IntraVENous Q4H PRN    sodium chloride (NS) flush 5-40 mL  5-40 mL IntraVENous Q8H    sodium chloride (NS) flush 5-40 mL  5-40 mL IntraVENous PRN    busPIRone (BUSPAR) tablet 10 mg  10 mg Oral BID    escitalopram oxalate (LEXAPRO) tablet 10 mg  10 mg Oral DAILY    ferrous sulfate tablet 325 mg  325 mg Oral DAILY    ketotifen (ZADITOR) 0.025 % (0.035 %) ophthalmic solution 1 Drop  1 Drop Both Eyes BID PRN    polyethylene glycol (MIRALAX) packet 17 g  17 g Oral DAILY      OBJECTIVE               Intake/Output Summary (Last 24 hours) at 9/28/2020 0929  Last data filed at 9/28/2020 0519  Gross per 24 hour   Intake 1250 ml   Output 450 ml   Net 800 ml       Review of Systems - History obtained from the patient AS PER  HPI    Telemetry NSr , freq PACs    PHYSICAL EXAM        Visit Vitals  BP (!) 148/75 (BP 1 Location: Right arm, BP Patient Position: At rest)   Pulse 71   Temp 98.4 °F (36.9 °C)   Resp 13   Ht 5' 2\" (1.575 m)   Wt 137 lb 6.4 oz (62.3 kg)   SpO2 (!) 85%   BMI 25.13 kg/m²       Gen: Well-developed, elderly, in no acute distress   HEENT:  Pink conjunctivae, Hearing grossly normal.No scleral icterus or conjunctival, moist mucous membranes  Neck: Supple,No JVD  Resp: No accessory muscle use, Clear breath sounds anterior   Card: Regular Rate,Rythm,No murmurs, rubs or gallop. No thrills.    GI:          soft, non-tender   MSK: No cyanosis or clubbing, good capillary refill  Skin: No rashes or ulcers  Neuro:   moving all four extremities  Psych:  KUNAL  LE: No edema, LLE in boot       DATA REVIEW            Laboratory and Imaging have been reviewed by me and are notable for  Recent Labs     09/26/20  1127   TROIQ <0.05     Recent Labs 09/28/20  0701 09/27/20  0420 09/26/20  1127 09/26/20  1100    144 144  --    K 4.0 4.0 4.1  --    CO2 25 26 26  --    BUN 12 16 15  --    CREA 0.47* 0.54* 0.52*  --    GLU 93 95 103*  --    PHOS 2.8 3.4 2.8  --    MG 1.8 1.9 2.1  --    WBC 9.2 11.1*  --  15.2*   HGB 9.4* 9.6*  --  11.4*   HCT 29.5* 30.7*  --  35.2    206  --  [facility]

## 2020-09-28 NOTE — TELEPHONE ENCOUNTER
Patient is at [address] and any appointments made have to go through them. Patients daughter got a message about an follow up appointment from the hospital and wanted to let the office know.  Thanks    Phone: [phone]

## 2020-09-28 NOTE — PROGRESS NOTES
Problem: Dysphagia (Adult)  Goal: *Acute Goals and Plan of Care (Insert Text)  Description: Swallowing goals initiated 9-28-20:  1) tolerate dysphagiag 1, thins without s/s aspiration or regurgitation by 10-1-20  Outcome: Progressing Towards Goal   SPEECH LANGUAGE PATHOLOGY BEDSIDE SWALLOW EVALUATION  Patient: Daniela Bernal (76 y.o. female)  Date: 9/28/2020  Primary Diagnosis: Fracture of tibia and fibula [S82.209A, S82.409A]  Procedure(s) (LRB):  LEFT TIBIA INTRA MEDULLARY NAIL (ANES CHOICE) (Left) 4 Days Post-Op   Precautions: aspiration  Fall, Aspiration, NWB    ASSESSMENT :  Based on the objective data described below, the patient presents with functional swallow for purees, thins. She started heaving after 50% of tray. She has h/o suspected esophageal transit issues on previous admissions with chest pain s/p PO. Admitted with tib-fib rx 9-24-20  plus sepsis from UTI. PMH: dementia, [place], HTN,. Lives in [address] [place] [address] at [address] [facility]. [facility] Patient will benefit from skilled intervention to address the above impairments. Patients rehabilitation potential is considered to be Good     PLAN :  Recommendations and Planned Interventions:  Continue dysphagia 1, thins. Upright during all PO and after for 30 min at least.   Frequency/Duration: Patient will be followed by speech-language pathology 2 times a week to address goals. Discharge Recommendations: back to LTC     SUBJECTIVE:   Patient had removed forehead 02 satmeter; both mitts. The mitts were replaced by SLP. 02 sats on NC was 99.     OBJECTIVE:     Past Medical History:   Diagnosis Date    Arthritis     Chronic kidney disease     kidney stones    Chronic pain     right hip    HTN (hypertension) 12/16/2019    Hypertension     Ill-defined condition     chronic diarrhea    Weak 12/19/2019    Weakness 1/2/2020     Past Surgical History:   Procedure Laterality Date    HX APPENDECTOMY      HX ORTHOPAEDIC      bilateral carpal tunnel    HX TONSILLECTOMY       Prior Level of Function/Home Situation:   Home Situation  Home Environment: Long term care([facility])  One/Two Story Residence: One story  Living Alone: No  Support Systems: Skilled nursing facility(LTC)  Patient Expects to be Discharged to[de-identified] Assisted living(LTC)  Current DME Used/Available at Home: Wheelchair  Diet prior to admission: ? Last diet at [facility] was dysphagia 2, thins  Current Diet:  dysphagia 1, thins. Cognitive and Communication Status:  Neurologic State: Alert, Confused  Orientation Level: Unable to verbalize  Cognition: Impaired decision making, Decreased command following, Impulsive(she can spontaneously communicating wants and needs)  Perception: Appears intact(severe [place])  Perseveration: No perseveration noted  Safety/Judgement: Lack of insight into deficits  Oral Assessment:  Oral Assessment  Labial: No impairment(dry lips  noted)  Dentition: (unable to assess, due to poor command following)  Oral Hygiene: mildly dry  Lingual: No impairment  Velum: No impairment  Mandible: No impairment  P.O. Trials:  Patient Position: upright in bed  Vocal quality prior to P.O.: No impairment  Consistency Presented: Puree; Thin liquid  How Presented: Self-fed/presented;SLP-fed/presented;Spoon;Straw(patient could hold cup, but not spoon)   ORAL PHASE:   Bolus Acceptance: No impairment(wiht purees, thins)  Bolus Formation/Control: No impairment     Propulsion: No impairment  Oral Residue: None   PHARYNGEAL PHASE:   Laryngeal Elevation: Functional  Aspiration Signs/Symptoms: None  Pharyngeal Phase Characteristics: (50% of the way through meal, she started heaving and tried to bring it back up. no production. patient has a h/o suspected esophageal issues on past admissions)      SLP last saw this patient dec 2019. She had GERD vs anxiety attacks with chest pain s/p solids. Recommended D2 diet b/c of poor chew and chest pain s/p Solids.                 NOMS:   The NOMS functional outcome measure was used to quantify this patient's level of swallowing impairment. Based on the NOMS, the patient was determined to be at level 4 for swallow function       NOMS Swallowing Levels:  Level 1 (CN): NPO  Level 2 (CM): NPO but takes consistency in therapy  Level 3 (CL): Takes less than 50% of nutrition p.o. and continues with nonoral feedings; and/or safe with mod cues; and/or max diet restriction  Level 4 (CK): Safe swallow but needs mod cues; and/or mod diet restriction; and/or still requires some nonoral feeding/supplements  Level 5 (CJ): Safe swallow with min diet restriction; and/or needs min cues  Level 6 (CI): Independent with p.o.; rare cues; usually self cues; may need to avoid some foods or needs extra time  Level 7 ([address] [place] [address]): Independent for all p.o.  TOMMY. (2003). National Outcomes Measurement System (NOMS): Adult Speech-Language Pathology User's Guide. Pain:  Pain Scale 1: Adult Nonverbal Pain Scale  Pain Intensity 1: 0  Pain Location 1: Leg    After treatment:   Patient left in no apparent distress in bed and Nursing notified    COMMUNICATION/EDUCATION:   Patient was educated regarding her deficit(s) of dysphagia  as this relates to her diagnosis of h/o dysphagia with tib-fib fx. She demonstrated Guarded understanding as evidenced by dementia. The patient's plan of care including recommendations, planned interventions, and recommended diet changes were discussed with: Certified nursing assistant/patient care technician. Patient is unable to participate in goal setting and plan of care.     Thank you for this referral.  Silvano Alexander, SLP  Time Calculation: 15 mins

## 2020-09-28 NOTE — PROGRESS NOTES
0730 Bedside and Verbal shift change report given to Livia Marks RN (oncoming nurse) by Tran Malone RN (offgoing nurse). Report included the following information SBAR and Kardex. 1450 Reviewed discharge and gave report to Antonieta Tineo RN at [facility], reviewed dressing to left leg to stay in place, and boot. Verbalized understanding. Discharge instructions placed in package for [address] [facility] [address] is in room updated patient returning to [facility] shortly. 1520 [facility] here to take patient to [facility] has package of information. Left in an adult diaper.

## 2020-09-28 NOTE — PROGRESS NOTES
Orthopaedic Progress Note  Post Op day: 4 Days Post-Op    2020 11:12 AM     Patient: Emily Woodard MRN: [id number]  SSN: [id number]    YOB: 1940  Age: 79 y.o. Sex: female      Admit date:  2020  Date of Surgery:  2020   Procedures:  Procedure(s):  LEFT TIBIA INTRA MEDULLARY NAIL (ANES CHOICE)  Admitting Physician:  Corbin Zavala MD   Surgeon:  Shi Lechuga) and Role:     Veronica Levine MD - Primary    Consulting Physician(s): Treatment Team: Attending Provider: Dodie Villalba MD; Consulting Provider: Jasiel Munoz; Care Manager: Renny Lopez; Surgeon: Neetu Fenton MD; Consulting Provider: Janet Menchaca MD; Utilization Review: Maxi Whipple; Consulting Provider: Alondra Gómez MD    SUBJECTIVE:     Emily Woodard is a 79 y.o. female is 4 Days Post-Op s/p Procedure(s):  LEFT TIBIA INTRA MEDULLARY NAIL (ANES CHOICE) with an appropriate level of post-operative pain. No complaints of nausea, vomiting, dizziness, lightheadedness, chest pain, or shortness of breath. OBJECTIVE:       Physical Exam:  General: Alert, cooperative, no distress. Respiratory: Respirations unlabored  Neurological:  Neurovascular exam within normal limits. Motor: + DF/PF. Musculoskeletal: Calves soft, supple, non-tender upon palpation. Dressing/Wound:  Clean, dry and intact. No significant erythema or swelling.       Vital Signs:        Patient Vitals for the past 8 hrs:   BP Temp Pulse Resp SpO2 Weight   20 1051     97 %    20 1050     99 %    20 0914 (!) 148/75 98.4 °F (36.9 °C) 71 13 (!) 85 %    20 0700   68      20 0518      62.3 kg (137 lb 6.4 oz)   20 0430 (!) 145/53 98.6 °F (37 °C) 64 19 100 %                                           Temp (24hrs), Av °F (37.2 °C), Min:98.4 °F (36.9 °C), Max:100 °F (37.8 °C)      Labs:        Recent Labs     20  0701   HCT 29.5* HGB 9.4*     Lab Results   Component Value Date/Time    Sodium 142 09/28/2020 07:01 AM    Potassium 4.0 09/28/2020 07:01 AM    Chloride 111 (H) 09/28/2020 07:01 AM    CO2 25 09/28/2020 07:01 AM    Glucose 93 09/28/2020 07:01 AM    BUN 12 09/28/2020 07:01 AM    Creatinine 0.47 (L) 09/28/2020 07:01 AM    Calcium 8.2 (L) 09/28/2020 07:01 AM       PT/OT:                Patient mobility  Bed Mobility Training  Rolling: Total assistance  Supine to Sit: Total assistance  Sit to Supine: Total assistance  Scooting: Total assistance                      ASSESSMENT / PLAN:   Principal Problem:    Fracture of tibia and fibula (9/24/2020)    Active Problems:    Leukocytosis (12/15/2019)      Atrial fibrillation with RVR ([facility] (12/15/2019)      Hard of hearing (12/19/2019)      Dementia ([facility] (12/19/2019)      Confusion (1/2/2020)      Anxiety (9/24/2020)      Acute cystitis with hematuria (9/25/2020)      Sinus arrhythmia (9/25/2020)      History of hypertension (9/25/2020)                    Pain Control: Adequate with oral agents    DVT Prophylaxis: Lovenox daily, SCDs    Therapy/ Weight bearing: NWB LLE   Wound/ incisional issues: C, D & I   Medical concerns:    Disposition: SNF when ok with Medicne     May remove ace wrap. Keep dressing intact and boot on. NV checks.     Signed By:  Dave Yepez PA-C    Orthopedic Surgery   [address] [place] [address]

## 2020-09-30 LAB
BACTERIA SPEC CULT: NORMAL
SERVICE CMNT-IMP: NORMAL

## 2020-10-04 NOTE — PROGRESS NOTES
Physician Progress Note      Zahida Gurrola  CSN #:                  [id number]  :                       1940  ADMIT DATE:       2020 10:42 AM  [address] DATE:        2020 4:00 PM  RESPONDING  PROVIDER #:        Tera Nunn MD          QUERY TEXT:    Dear [facility] Team,  Patient admitted with a Closed nondisplaced Proximal Tibia and Fibula Fracture. It's noted documentation of sepsis 2/2 UTI starting within progress note on  which is carried through subsequent progress notes including the discharge summary. Please indicate one of the following and document in the medical record: The medical record reflects the following:    Risk Factors: 79 Yr F admitted after a GLF resulting in a Closed nondisplaced Proximal Tibia and Fibula Fracture    Clinical Indicators: Patient arrives via EMS after a GLF at [facility]. Work up in the ED revealed a Closed nondisplaced Proximal Tibia and Fibula Fracture. Patient noted per workup to also have a WBC of 16.9 on admission with next day peaking at 19.3. UA completed was suspicious for a UTI and Urine Culture was sent. Urine Culture was positive for ENTEROCOCCUS FAECALIS and PROVIDENCIA STUARTII. On admission patient's Vital signs reveal a Temperature of 98.2 HR 67 RR 17 and /46. Vital signs remained stable until  at 1103 where patient's HR was 124-134 with RR 20 and highest documented temperature of 99.3. No lactic acid was drawn on the patient. The patient was treated with a 500 ML NS IVF bolus on , NS IVF at 50 ML/HR, Rocephin 1-2 G IV Q 24 hrs, cefdinir 300 mg PO Q 12 hrs and Levaquiin 750 mg IV Q 24 hrs. Treatment: Daily CBC/BMP, UA/UC, frequent vital signs/monitoring,  500 ML NS IVF bolus on , NS IVF at 50 ML/HR, Rocephin 1-2 G IV Q 24 hrs, cefdinir 300 mg PO Q 12 hrs and Levaquiin 750 mg IV Q 24 hrs.     Thank you,  Radha Clark RN, [address] [place] [address]  [phone]  Options provided:  -- Sepsis present as evidenced by, Please document evidence. -- Sepsis was ruled out after study  -- Other - I will add my own diagnosis  -- Disagree - Not applicable / Not valid  -- Disagree - Clinically unable to determine / Unknown  -- Refer to Clinical Documentation Reviewer    PROVIDER RESPONSE TEXT:    Sepsis was ruled out after study. Query created by:  Petrona Magana on 10/2/2020 9:12 AM      Electronically signed by:  Minesh Worthington MD 10/4/2020 10:01 AM

## 2020-11-24 ENCOUNTER — HOME VISIT (OUTPATIENT)
Dept: FAMILY MEDICINE CLINIC | Age: 80
End: 2020-11-24
Payer: MEDICARE

## 2020-11-24 VITALS
SYSTOLIC BLOOD PRESSURE: 149 MMHG | BODY MASS INDEX: 22.5 KG/M2 | TEMPERATURE: 99.1 F | DIASTOLIC BLOOD PRESSURE: 66 MMHG | RESPIRATION RATE: 20 BRPM | OXYGEN SATURATION: 96 % | WEIGHT: 123 LBS | HEART RATE: 57 BPM

## 2020-11-24 DIAGNOSIS — R63.4 WEIGHT LOSS: ICD-10-CM

## 2020-11-24 DIAGNOSIS — R00.1 BRADYCARDIA: ICD-10-CM

## 2020-11-24 DIAGNOSIS — S82.402D TIBIA/FIBULA FRACTURE, LEFT, CLOSED, WITH ROUTINE HEALING, SUBSEQUENT ENCOUNTER: ICD-10-CM

## 2020-11-24 DIAGNOSIS — I48.0 PAROXYSMAL A-FIB (HCC): Primary | ICD-10-CM

## 2020-11-24 DIAGNOSIS — I10 ESSENTIAL HYPERTENSION: ICD-10-CM

## 2020-11-24 DIAGNOSIS — S82.202D TIBIA/FIBULA FRACTURE, LEFT, CLOSED, WITH ROUTINE HEALING, SUBSEQUENT ENCOUNTER: ICD-10-CM

## 2020-11-24 DIAGNOSIS — F03.91 DEMENTIA WITH BEHAVIORAL DISTURBANCE, UNSPECIFIED DEMENTIA TYPE: ICD-10-CM

## 2020-11-24 PROCEDURE — 1101F PT FALLS ASSESS-DOCD LE1/YR: CPT | Performed by: STUDENT IN AN ORGANIZED HEALTH CARE EDUCATION/TRAINING PROGRAM

## 2020-11-24 PROCEDURE — 99309 SBSQ NF CARE MODERATE MDM 30: CPT | Performed by: STUDENT IN AN ORGANIZED HEALTH CARE EDUCATION/TRAINING PROGRAM

## 2020-11-24 PROCEDURE — G8432 DEP SCR NOT DOC, RNG: HCPCS | Performed by: STUDENT IN AN ORGANIZED HEALTH CARE EDUCATION/TRAINING PROGRAM

## 2020-11-24 PROCEDURE — G8536 NO DOC ELDER MAL SCRN: HCPCS | Performed by: STUDENT IN AN ORGANIZED HEALTH CARE EDUCATION/TRAINING PROGRAM

## 2020-11-24 NOTE — PROGRESS NOTES
Latia Car  79 y.o. female  1940  [address]  [id number]   [address] @ [facility]  Recertification Progress Note  Jigna Robins MD       Encounter Date: 11/24/2020    Chief Complaint   Patient presents with    Follow-up     History of Present Illness   Latia Lee is a 79 y.o. female who was seen at [facility] today for their 50-PJL recertification. She was last seen 11/6/20. Nurse concerned that patient was screaming during the overnight shift yesterday. On evaluation, patient eating and appears comfortable. Patient is demented and does not answer questions. On evaluation, /66, HR 45 while patient somnolent then 57 when awake and eating. Diet: mechanical soft diet  continue prescribed diet    Advance weight bearing as tolerated in Cam boot; wear Cam boot when starts to ambulate; wheelchair  Review of Systems   Review of Systems   Unable to perform ROS: Dementia         Vitals/Objective:     Vitals:    11/24/20 1212   BP: (!) 149/66   Pulse: (!) 57   Resp: 20   Temp: 99.1 °F (37.3 °C)   TempSrc: Oral   SpO2: 96%   Weight: 123 lb (55.8 kg)     Body mass index is 22.5 kg/m². Weight is decreasing steadily    Physical Exam  HENT:      Head: Normocephalic and atraumatic. Nose: Nose normal.      Mouth/Throat:      Mouth: Mucous membranes are moist.   Eyes:      Extraocular Movements: Extraocular movements intact. Conjunctiva/sclera: Conjunctivae normal.   Cardiovascular:      Rate and Rhythm: Regular rhythm. Bradycardia present. Pulses: Normal pulses. Heart sounds: Normal heart sounds. No murmur. No gallop. Pulmonary:      Effort: Pulmonary effort is normal.      Breath sounds: Normal breath sounds. No wheezing, rhonchi or rales. Abdominal:      General: Abdomen is flat. Palpations: Abdomen is soft. Musculoskeletal:         General: No swelling. Skin:     General: Skin is warm and dry. Neurological:      General: No focal deficit present. Mental Status: She is alert. Comments: Demented; does not answer questions         Pertinent Lab/Test Results: labs from 11/9/20 notable for Cr 0.53, Hgb 13.3, TSH 0.521    Assessment and Plan:        PAfib: Now bradycardic to 50s. No AC due to bleeding risk.  -Continue Amiodarone 200mg daily  -Decrease Metoprolol 12.5mg BID to 12.5mg daily  -F/u with Dr. Carolina Velasquez    Left tib/fib fracture s/p fixation: Non-weightbearing LLE until 12/10/20.  -continue Tylenol 650mg Q8H PRN  -F/u with Dr. Charlotte Dailey, Ortho, on 3/8/21    HTN: BP ok for age. -Decrease to Metoprolol 12.5mg daily due to bradycardia  -Continue Amiodarone 200mg daily    Bradycardia: HR 57; regular rhythm. HR has been in 46s per nursing staff. TSH, BMP, CBC normal on 11/9/20. Has had sinus mile on EKG in 12/2019; most recent EKG in 9/2020 shows sinus rhythm w/ short CA and occasional PVCs (HR 82). -EKG  -Check vitals daily for 1 week  -Check BMP, Mg  -Decrease to Metoprolol 12.5mg daily  -Continue Amiodarone 200mg daily  -Will have Cardiology appointment in 1/2021 r/s to sooner appt     Dementia: Not on medications. Followed by Psych. Had one episode of behavioral disturbance described as screaming during the shift. Concern from nursing that patient has showed steady decline. Attempted to reach POA to discuss goals of care/code status, however was only able to leave voicemail. Weight loss: Wt 127lb  one month ago-->123lb. Possibly linked to dementia; requires assistance for meals. TSH, CBC, BMP normal in 11/2020.  -Nutritional supplements w/ meals    Chronic hearing Loss: Stable. Anemia: Hgb 9.6 post-op--> 13.3 in 11/2020.   -Continue iron supplement daily    Anxiety/Depression:   -Buspar 10mg BID and Lexapro 10mg daily    Allergic conjunctivitis: Stable.   -Continue Zatidor eye drops BID    Constipation:  -Continue Miralax daily    CODE STATUS: Full    I have discussed the assessment and plan with the patient. Left voicemail with POA to discuss care. Electronically Signed: Anne Marie Sharpe MD, Family Medicine Resident     History   Patients past medical, surgical and family histories were reviewed and updated.       Past Medical History:   Diagnosis Date    Arthritis     Chronic kidney disease     kidney stones    Chronic pain     right hip    HTN (hypertension) 12/16/2019    Hypertension     Ill-defined condition     chronic diarrhea    Weak 12/19/2019    Weakness 1/2/2020     Past Surgical History:   Procedure Laterality Date    HX APPENDECTOMY      HX ORTHOPAEDIC      bilateral carpal tunnel    HX TONSILLECTOMY       Family History   Problem Relation Age of Onset    Cancer Father      Social History     Socioeconomic History    Marital status:      Spouse name: Not on file    Number of children: Not on file    Years of education: Not on file    Highest education level: Not on file   Occupational History    Not on file   Social Needs    Financial resource strain: Not on file    Food insecurity     Worry: Not on file     Inability: Not on file    Transportation needs     Medical: Not on file     Non-medical: Not on file   Tobacco Use    Smoking status: Never Smoker    Smokeless tobacco: Never Used   Substance and Sexual Activity    Alcohol use: No    Drug use: No    Sexual activity: Not on file   Lifestyle    Physical activity     Days per week: Not on file     Minutes per session: Not on file    Stress: Not on file   Relationships    Social connections     Talks on phone: Not on file     Gets together: Not on file     Attends Anabaptism service: Not on file     Active member of club or organization: Not on file     Attends meetings of clubs or organizations: Not on file     Relationship status: Not on file    Intimate partner violence     Fear of current or ex partner: Not on file     Emotionally abused: Not on file     Physically abused: Not on file Forced sexual activity: Not on file   Other Topics Concern    Not on file   Social History Narrative    Not on file            Current Medications/Allergies     Current Outpatient Medications   Medication Sig Dispense Refill    amiodarone (CORDARONE) 200 mg tablet Take 1 Tab by mouth daily. 30 Tab 0    metoprolol tartrate (LOPRESSOR) 25 mg tablet Take 0.5 Tabs by mouth two (2) times a day. 30 Tab 0    acetaminophen (TYLENOL) 650 mg TbER Take 650 mg by mouth every eight (8) hours.  busPIRone (BUSPAR) 10 mg tablet Take 10 mg by mouth two (2) times a day.  ferrous sulfate 325 mg (65 mg iron) tablet Take 325 mg by mouth daily.  escitalopram oxalate (Lexapro) 10 mg tablet Take 10 mg by mouth daily.  polyethylene glycol (Miralax) 17 gram/dose powder Take 17 g by mouth daily.  ketotifen (Zaditor) 0.025 % (0.035 %) ophthalmic solution Administer 1 Drop to both eyes two (2) times a day. Indications: allergic conjunctivitis       Allergies   Allergen Reactions    Egg Unknown (comments)     Patient stated she is allergic. Unable to describe reaction type.      Pcn [Penicillins] Itching    Sulfa (Sulfonamide Antibiotics) Itching

## 2020-11-27 RX ORDER — AMLODIPINE BESYLATE 5 MG/1
5 TABLET ORAL DAILY
Qty: 30 TAB | Refills: 0
Start: 2020-11-27

## 2020-11-27 NOTE — PROGRESS NOTES
Pt hypertensive to SBP 170s after holding Metoprolol 2/2 bradycardia. D/c Metoprolol and switch to Norvasc 5mg daily.

## 2020-12-01 ENCOUNTER — HOSPITAL ENCOUNTER (EMERGENCY)
Age: 80
Discharge: HOME | End: 2020-12-02
Attending: STUDENT IN AN ORGANIZED HEALTH CARE EDUCATION/TRAINING PROGRAM
Payer: MEDICARE

## 2020-12-01 DIAGNOSIS — R89.9 ABNORMAL LABORATORY TEST: Primary | ICD-10-CM

## 2020-12-01 LAB
ALBUMIN SERPL-MCNC: 3.6 G/DL (ref 3.5–5)
ALBUMIN/GLOB SERPL: 1.1 {RATIO} (ref 1.1–2.2)
ALP SERPL-CCNC: 109 U/L (ref 45–117)
ALT SERPL-CCNC: 18 U/L (ref 12–78)
ANION GAP SERPL CALC-SCNC: 4 MMOL/L (ref 5–15)
AST SERPL-CCNC: 16 U/L (ref 15–37)
BASOPHILS # BLD: 0.1 K/UL (ref 0–0.1)
BASOPHILS NFR BLD: 1 % (ref 0–1)
BILIRUB SERPL-MCNC: 0.5 MG/DL (ref 0.2–1)
BUN SERPL-MCNC: 30 MG/DL (ref 6–20)
BUN/CREAT SERPL: 38 (ref 12–20)
CALCIUM SERPL-MCNC: 8.8 MG/DL (ref 8.5–10.1)
CHLORIDE SERPL-SCNC: 110 MMOL/L (ref 97–108)
CO2 SERPL-SCNC: 29 MMOL/L (ref 21–32)
COMMENT, HOLDF: NORMAL
CREAT SERPL-MCNC: 0.8 MG/DL (ref 0.55–1.02)
DIFFERENTIAL METHOD BLD: ABNORMAL
EOSINOPHIL # BLD: 0.2 K/UL (ref 0–0.4)
EOSINOPHIL NFR BLD: 2 % (ref 0–7)
ERYTHROCYTE [DISTWIDTH] IN BLOOD BY AUTOMATED COUNT: 13.8 % (ref 11.5–14.5)
GLOBULIN SER CALC-MCNC: 3.4 G/DL (ref 2–4)
GLUCOSE SERPL-MCNC: 115 MG/DL (ref 65–100)
HCT VFR BLD AUTO: 41 % (ref 35–47)
HGB BLD-MCNC: 13.3 G/DL (ref 11.5–16)
IMM GRANULOCYTES # BLD AUTO: 0 K/UL (ref 0–0.04)
IMM GRANULOCYTES NFR BLD AUTO: 0 % (ref 0–0.5)
INR PPP: 1 (ref 0.9–1.1)
LYMPHOCYTES # BLD: 2.5 K/UL (ref 0.8–3.5)
LYMPHOCYTES NFR BLD: 22 % (ref 12–49)
MCH RBC QN AUTO: 30.7 PG (ref 26–34)
MCHC RBC AUTO-ENTMCNC: 32.4 G/DL (ref 30–36.5)
MCV RBC AUTO: 94.7 FL (ref 80–99)
MONOCYTES # BLD: 1.3 K/UL (ref 0–1)
MONOCYTES NFR BLD: 11 % (ref 5–13)
NEUTS SEG # BLD: 7.5 K/UL (ref 1.8–8)
NEUTS SEG NFR BLD: 64 % (ref 32–75)
NRBC # BLD: 0 K/UL (ref 0–0.01)
NRBC BLD-RTO: 0 PER 100 WBC
PLATELET # BLD AUTO: 320 K/UL (ref 150–400)
PMV BLD AUTO: 10.6 FL (ref 8.9–12.9)
POTASSIUM SERPL-SCNC: 4.6 MMOL/L (ref 3.5–5.1)
PROT SERPL-MCNC: 7 G/DL (ref 6.4–8.2)
PROTHROMBIN TIME: 10.8 SEC (ref 9–11.1)
RBC # BLD AUTO: 4.33 M/UL (ref 3.8–5.2)
SAMPLES BEING HELD,HOLD: NORMAL
SODIUM SERPL-SCNC: 143 MMOL/L (ref 136–145)
WBC # BLD AUTO: 11.6 K/UL (ref 3.6–11)

## 2020-12-01 PROCEDURE — 85610 PROTHROMBIN TIME: CPT

## 2020-12-01 PROCEDURE — 99285 EMERGENCY DEPT VISIT HI MDM: CPT

## 2020-12-01 PROCEDURE — 80053 COMPREHEN METABOLIC PANEL: CPT

## 2020-12-01 PROCEDURE — 85025 COMPLETE CBC W/AUTO DIFF WBC: CPT

## 2020-12-01 PROCEDURE — 36415 COLL VENOUS BLD VENIPUNCTURE: CPT

## 2020-12-02 VITALS
HEART RATE: 64 BPM | TEMPERATURE: 98.6 F | RESPIRATION RATE: 13 BRPM | BODY MASS INDEX: 24.8 KG/M2 | DIASTOLIC BLOOD PRESSURE: 45 MMHG | HEIGHT: 59 IN | OXYGEN SATURATION: 95 % | WEIGHT: 123 LBS | SYSTOLIC BLOOD PRESSURE: 181 MMHG

## 2020-12-02 NOTE — ED TRIAGE NOTES
Pt brought in by EMS from [facility] for hypotension and elevated sodium level. Pt is agitated, screaming, and disoriented. She has a history of dementia/alzhiemer's.

## 2020-12-02 NOTE — ED PROVIDER NOTES
History is limited due to patient's dementia. History was obtained by EMS personnel and by calling her [facility]      Patient was referred for abnormal lab findings, sodium was 161 and potassium was 5.5. Discharge had to be from 11/25/2020. A copy of the labs were scanned to the chart. Patient has dementia but she does not endorse any ongoing discomfort at this time. Her nurse at her assisted living facility also did not endorse any changes in her behavior.            Past Medical History:   Diagnosis Date    Arthritis     Chronic kidney disease     kidney stones    Chronic pain     right hip    HTN (hypertension) 12/16/2019    Hypertension     Ill-defined condition     chronic diarrhea    Weak 12/19/2019    Weakness 1/2/2020       Past Surgical History:   Procedure Laterality Date    HX APPENDECTOMY      HX ORTHOPAEDIC      bilateral carpal tunnel    HX TONSILLECTOMY           Family History:   Problem Relation Age of Onset    Cancer Father        Social History     Socioeconomic History    Marital status:      Spouse name: Not on file    Number of children: Not on file    Years of education: Not on file    Highest education level: Not on file   Occupational History    Not on file   Social Needs    Financial resource strain: Not on file    Food insecurity     Worry: Not on file     Inability: Not on file    Transportation needs     Medical: Not on file     Non-medical: Not on file   Tobacco Use    Smoking status: Never Smoker    Smokeless tobacco: Never Used   Substance and Sexual Activity    Alcohol use: No    Drug use: No    Sexual activity: Not on file   Lifestyle    Physical activity     Days per week: Not on file     Minutes per session: Not on file    Stress: Not on file   Relationships    Social connections     Talks on phone: Not on file     Gets together: Not on file     Attends Adventism service: Not on file     Active member of club or organization: Not on file     Attends meetings of clubs or organizations: Not on file     Relationship status: Not on file    Intimate partner violence     Fear of current or ex partner: Not on file     Emotionally abused: Not on file     Physically abused: Not on file     Forced sexual activity: Not on file   Other Topics Concern    Not on file   Social History Narrative    Not on file         ALLERGIES: Egg; Pcn [penicillins]; and Sulfa (sulfonamide antibiotics)    Review of Systems   Unable to perform ROS: Dementia       Vitals:    12/01/20 1949   BP: (!) 157/102   Pulse: 75   Resp: 19   Temp: 98.4 °F (36.9 °C)   SpO2: 100%   Weight: 55.8 kg (123 lb)   Height: 4' 11\" (1.499 m)            Physical Exam  Vitals signs reviewed. Constitutional:       General: She is not in acute distress. HENT:      Head: Normocephalic and atraumatic. Mouth/Throat:      Mouth: Mucous membranes are moist.      Pharynx: Oropharynx is clear. Cardiovascular:      Rate and Rhythm: Normal rate and regular rhythm. Heart sounds: Normal heart sounds. Pulmonary:      Effort: Pulmonary effort is normal.      Breath sounds: Normal breath sounds. Abdominal:      Tenderness: There is no abdominal tenderness. There is no guarding or rebound. Musculoskeletal: Normal range of motion. Skin:     General: Skin is warm and dry. Capillary Refill: Capillary refill takes less than 2 seconds. Neurological:      General: No focal deficit present. Mental Status: She is alert. Comments: Disoriented to place, rationale for transfer to the hospital but did have recall of her name. She confabulates. She was able to move all extremities well. Clear speech though difficult to follow without process. Psychiatric:         Mood and Affect: Mood normal.          MDM  Number of Diagnoses or Management Options  Abnormal laboratory test:   Diagnosis management comments: Patient was referred for abnormal labs.   They have normalized in the intervening time. Her vitals are also normal.    ED Course as of Dec 01 2155   Tue Dec 01, 2020   2144 Discussed with Lupe Beltrán from Nelly Anderson RN    Confirmed patient's baseline as far as possible. Patient does have a documented history of dementia.     [NS]      ED Course User Index  [NS] Shraddha Simmons MD       Procedures        Laboratory tests obtained 11/25/2020 revealed sodium 161, potassium 5.5

## 2020-12-02 NOTE — DISCHARGE INSTRUCTIONS
Laboratory tests obtained today revealed normal sodium and potassium values. She does not appear to have any acute medical issues  .

## 2020-12-02 NOTE — ED NOTES
No paperwork from [facility] available. EMS may have taken with them. Telephoned [facility] at [phone] for recent history and labs to be faxed.

## 2020-12-17 ENCOUNTER — HOME VISIT (OUTPATIENT)
Dept: FAMILY MEDICINE CLINIC | Age: 80
End: 2020-12-17
Payer: MEDICARE

## 2020-12-17 VITALS
HEART RATE: 66 BPM | DIASTOLIC BLOOD PRESSURE: 53 MMHG | SYSTOLIC BLOOD PRESSURE: 131 MMHG | OXYGEN SATURATION: 95 % | RESPIRATION RATE: 19 BRPM | BODY MASS INDEX: 24.04 KG/M2 | WEIGHT: 119 LBS | TEMPERATURE: 96.5 F

## 2020-12-17 DIAGNOSIS — K59.00 CONSTIPATION, UNSPECIFIED CONSTIPATION TYPE: ICD-10-CM

## 2020-12-17 DIAGNOSIS — H10.13 ALLERGIC CONJUNCTIVITIS OF BOTH EYES: ICD-10-CM

## 2020-12-17 DIAGNOSIS — F41.9 ANXIETY: ICD-10-CM

## 2020-12-17 DIAGNOSIS — S82.202D CLOSED FRACTURE OF LEFT TIBIA AND FIBULA WITH ROUTINE HEALING, SUBSEQUENT ENCOUNTER: ICD-10-CM

## 2020-12-17 DIAGNOSIS — S82.402D CLOSED FRACTURE OF LEFT TIBIA AND FIBULA WITH ROUTINE HEALING, SUBSEQUENT ENCOUNTER: ICD-10-CM

## 2020-12-17 DIAGNOSIS — Z78.9 FULL CODE STATUS: ICD-10-CM

## 2020-12-17 DIAGNOSIS — F32.A DEPRESSION, UNSPECIFIED DEPRESSION TYPE: ICD-10-CM

## 2020-12-17 DIAGNOSIS — I10 ESSENTIAL HYPERTENSION: Chronic | ICD-10-CM

## 2020-12-17 DIAGNOSIS — F03.91 DEMENTIA WITH BEHAVIORAL DISTURBANCE, UNSPECIFIED DEMENTIA TYPE: Chronic | ICD-10-CM

## 2020-12-17 DIAGNOSIS — I48.0 PAROXYSMAL ATRIAL FIBRILLATION (HCC): Primary | ICD-10-CM

## 2020-12-17 DIAGNOSIS — D50.9 IRON DEFICIENCY ANEMIA, UNSPECIFIED IRON DEFICIENCY ANEMIA TYPE: ICD-10-CM

## 2020-12-17 PROCEDURE — G8536 NO DOC ELDER MAL SCRN: HCPCS | Performed by: STUDENT IN AN ORGANIZED HEALTH CARE EDUCATION/TRAINING PROGRAM

## 2020-12-17 PROCEDURE — G8432 DEP SCR NOT DOC, RNG: HCPCS | Performed by: STUDENT IN AN ORGANIZED HEALTH CARE EDUCATION/TRAINING PROGRAM

## 2020-12-17 PROCEDURE — 1101F PT FALLS ASSESS-DOCD LE1/YR: CPT | Performed by: STUDENT IN AN ORGANIZED HEALTH CARE EDUCATION/TRAINING PROGRAM

## 2020-12-17 PROCEDURE — 99309 SBSQ NF CARE MODERATE MDM 30: CPT | Performed by: STUDENT IN AN ORGANIZED HEALTH CARE EDUCATION/TRAINING PROGRAM

## 2020-12-17 NOTE — PROGRESS NOTES
Doreen Montemayor  79 y.o. female  1940  [address] [address] [facility]  [id number]   [address] @ [facility]  Recertification Progress Note  Susan Daley       Encounter Date: 12/17/2020    Chief Complaint   Patient presents with    Follow Up Chronic Condition     History of Present Illness   Doreen Montemayor is a 79 y.o. female who was seen at [facility] today for their 84-/07-ISD recertification. She was last seen 11/27/2020      No new concerns per nursing or per patient. There was some concern a week or two ago with screaming outbursts. Per nursing they believed this was related to her lower leg pain as it would happen consistently 1-2 hours prior to her next tylenol dose. Tylenol was adjusted to ER and since then patient has been doing much better with less screaming outbursts. Of note lexapro has also been increased from 10 to 20 which may be helping as well. Diet: mechanical soft diet  continue prescribed diet     Weight bearing to LLE as tolerated; Wear Cam boot while ambulating; wheelchair prn  Review of Systems   ROS  Unable to perform: Dementia    Vitals/Objective:     Vitals:    12/17/20 1005   BP: (!) 131/53   Pulse: 66   Resp: 19   Temp: (!) 96.5 °F (35.8 °C)   SpO2: 95%   Weight: 119 lb (54 kg)     Body mass index is 24.04 kg/m². Weight is decreasing steadily. Weight was 132lb 11/2020    Physical Exam  Constitutional:       General: She is not in acute distress. Comments: She is sleeping upon entering room and sleepy throughout exam   HENT:      Head: Normocephalic and atraumatic. Eyes:      Conjunctiva/sclera: Conjunctivae normal.   Cardiovascular:      Rate and Rhythm: Normal rate and regular rhythm. Heart sounds: No murmur. No friction rub. No gallop. Pulmonary:      Effort: Pulmonary effort is normal. No respiratory distress. Breath sounds: Normal breath sounds. No wheezing or rales.    Abdominal:      General: Abdomen is flat. Bowel sounds are normal. There is no distension. Palpations: Abdomen is soft. Tenderness: There is no abdominal tenderness. Skin:     General: Skin is warm and dry. Neurological:      Mental Status: Mental status is at baseline. Pertinent Lab/Test Results: labs from 11/9/20 notable for Cr 0.53, Hgb 13.3, TSH 0.521    Assessment and Plan:   PAfib: HR 66 today and rhythm regular, No AC due to bleeding risk.  -Amiodarone 200mg daily  -Was previously on metoprolol, which was discontinued due to bradycardia   -F/u with Dr. Torey Jeong on 12/18/2020     Left tib/fib fracture s/p fixation: Now weight bearing with cam boot on LLE as tolerated   -Tylenol 650mg Q8H PRN  -F/u with Dr. Kylee Garcia, Ortho, on 3/8/21     HTN: Stable  -Amiodarone 200mg daily, Norvasc 5mg daily      Dementia:   - Follows with psych, last eval 12/10     Weight loss: Wt 127lb  one month ago->123lb-> 119lb today. Possibly linked to dementia; requires assistance for meals. TSH, CBC, BMP normal in 11/2020.  -Nutritional supplements w/ meals     Chronic hearing Loss: Stable     Anemia: Hgb 13.3 in 11/2020.   -Ferrous sulfate 325mg daily     Anxiety/Depression:   -Buspar 10mg BID and Lexapro 20mg daily     Allergic conjunctivitis: Stable. -Zatidor eye drops BID    COVID Discussion  -POA likely interested in patient receiving the vaccine, but is not 100% decided at this time. POA is also undecided about whether she would like patient to be hospitalized IF needed if she developed COVID. POA is also currently having family discussions regarding code status and are considering changing from full code but would like to continue to think about it at this time. We also had a long discussion regarding quality of life vs quantity of life. We will continue to have these discussions moving forward. CODE STATUS: Full    I have discussed the assessment and plan with the patient and their responsible party as seen in the above orders.   They have expressed understanding. Electronically Signed: Christi Herron DO     History   Patients past medical, surgical and family histories were reviewed and updated.   Yes    Past Medical History:   Diagnosis Date    Arthritis     Chronic kidney disease     kidney stones    Chronic pain     right hip    HTN (hypertension) 12/16/2019    Hypertension     Ill-defined condition     chronic diarrhea    Weak 12/19/2019    Weakness 1/2/2020     Past Surgical History:   Procedure Laterality Date    HX APPENDECTOMY      HX ORTHOPAEDIC      bilateral carpal tunnel    HX TONSILLECTOMY       Family History   Problem Relation Age of Onset    Cancer Father      Social History     Socioeconomic History    Marital status:      Spouse name: Not on file    Number of children: Not on file    Years of education: Not on file    Highest education level: Not on file   Occupational History    Not on file   Social Needs    Financial resource strain: Not on file    Food insecurity     Worry: Not on file     Inability: Not on file    Transportation needs     Medical: Not on file     Non-medical: Not on file   Tobacco Use    Smoking status: Never Smoker    Smokeless tobacco: Never Used   Substance and Sexual Activity    Alcohol use: No    Drug use: No    Sexual activity: Not on file   Lifestyle    Physical activity     Days per week: Not on file     Minutes per session: Not on file    Stress: Not on file   Relationships    Social connections     Talks on phone: Not on file     Gets together: Not on file     Attends Worship service: Not on file     Active member of club or organization: Not on file     Attends meetings of clubs or organizations: Not on file     Relationship status: Not on file    Intimate partner violence     Fear of current or ex partner: Not on file     Emotionally abused: Not on file     Physically abused: Not on file     Forced sexual activity: Not on file   Other Topics Concern  Not on file   Social History Narrative    Not on file            Current Medications/Allergies     Current Outpatient Medications   Medication Sig Dispense Refill    amLODIPine (NORVASC) 5 mg tablet Take 1 Tab by mouth daily. 30 Tab 0    amiodarone (CORDARONE) 200 mg tablet Take 1 Tab by mouth daily. 30 Tab 0    acetaminophen (TYLENOL) 650 mg TbER Take 650 mg by mouth every eight (8) hours.  busPIRone (BUSPAR) 10 mg tablet Take 10 mg by mouth two (2) times a day.  escitalopram oxalate (Lexapro) 20 mg tablet Take 20 mg by mouth daily.  polyethylene glycol (Miralax) 17 gram/dose powder Take 17 g by mouth daily.  ketotifen (Zaditor) 0.025 % (0.035 %) ophthalmic solution Administer 1 Drop to both eyes two (2) times a day. Indications: allergic conjunctivitis      ferrous sulfate 325 mg (65 mg iron) tablet Take 325 mg by mouth daily. Allergies   Allergen Reactions    Egg Unknown (comments)     Patient stated she is allergic. Unable to describe reaction type.      Pcn [Penicillins] Itching    Sulfa (Sulfonamide Antibiotics) Itching

## 2020-12-18 ENCOUNTER — TELEPHONE (OUTPATIENT)
Dept: CARDIOLOGY CLINIC | Age: 80
End: 2020-12-18

## 2020-12-18 ENCOUNTER — OFFICE VISIT (OUTPATIENT)
Dept: CARDIOLOGY CLINIC | Age: 80
End: 2020-12-18
Payer: MEDICARE

## 2020-12-18 VITALS
SYSTOLIC BLOOD PRESSURE: 108 MMHG | HEART RATE: 52 BPM | BODY MASS INDEX: 24.04 KG/M2 | HEIGHT: 59 IN | DIASTOLIC BLOOD PRESSURE: 50 MMHG

## 2020-12-18 DIAGNOSIS — F03.91 DEMENTIA WITH BEHAVIORAL DISTURBANCE, UNSPECIFIED DEMENTIA TYPE: ICD-10-CM

## 2020-12-18 DIAGNOSIS — I49.8 SINUS ARRHYTHMIA: ICD-10-CM

## 2020-12-18 DIAGNOSIS — Z86.79 HISTORY OF HYPERTENSION: ICD-10-CM

## 2020-12-18 DIAGNOSIS — I48.0 PAROXYSMAL ATRIAL FIBRILLATION (HCC): Primary | ICD-10-CM

## 2020-12-18 PROCEDURE — 93010 ELECTROCARDIOGRAM REPORT: CPT | Performed by: INTERNAL MEDICINE

## 2020-12-18 PROCEDURE — G8754 DIAS BP LESS 90: HCPCS | Performed by: NURSE PRACTITIONER

## 2020-12-18 PROCEDURE — G8400 PT W/DXA NO RESULTS DOC: HCPCS | Performed by: NURSE PRACTITIONER

## 2020-12-18 PROCEDURE — G8427 DOCREV CUR MEDS BY ELIG CLIN: HCPCS | Performed by: NURSE PRACTITIONER

## 2020-12-18 PROCEDURE — 99215 OFFICE O/P EST HI 40 MIN: CPT | Performed by: NURSE PRACTITIONER

## 2020-12-18 PROCEDURE — 1090F PRES/ABSN URINE INCON ASSESS: CPT | Performed by: NURSE PRACTITIONER

## 2020-12-18 PROCEDURE — 1101F PT FALLS ASSESS-DOCD LE1/YR: CPT | Performed by: NURSE PRACTITIONER

## 2020-12-18 PROCEDURE — 93005 ELECTROCARDIOGRAM TRACING: CPT | Performed by: INTERNAL MEDICINE

## 2020-12-18 PROCEDURE — G8420 CALC BMI NORM PARAMETERS: HCPCS | Performed by: NURSE PRACTITIONER

## 2020-12-18 PROCEDURE — G0463 HOSPITAL OUTPT CLINIC VISIT: HCPCS | Performed by: INTERNAL MEDICINE

## 2020-12-18 PROCEDURE — G8432 DEP SCR NOT DOC, RNG: HCPCS | Performed by: NURSE PRACTITIONER

## 2020-12-18 PROCEDURE — G8536 NO DOC ELDER MAL SCRN: HCPCS | Performed by: NURSE PRACTITIONER

## 2020-12-18 PROCEDURE — G8752 SYS BP LESS 140: HCPCS | Performed by: NURSE PRACTITIONER

## 2020-12-18 NOTE — PROGRESS NOTES
HISTORY OF PRESENTING ILLNESS      Aldair Villareal is a 79 y.o. female with tibia/fibula fracture and planned for surgery. She has advanced dementia. Atrial fibrillation with RVR with borderline SBP (106 mm Hg) was noted and she was started on amiodarone gtt and IV fluids were administered. BP has since improved (147/51). She was changed to oral amiodarone. She was felt to be high risk for complications on anticoagulation. EKG today shows sinus rhythm. Her cousin was called during the visit per request as the patient has advanced dementia, however was unable to reach her and left vm. No updates from [facility]. PAST MEDICAL HISTORY     Past Medical History:   Diagnosis Date    Arthritis     Chronic kidney disease     kidney stones    Chronic pain     right hip    HTN (hypertension) 12/16/2019    Hypertension     Ill-defined condition     chronic diarrhea    Weak 12/19/2019    Weakness 1/2/2020           PAST SURGICAL HISTORY     Past Surgical History:   Procedure Laterality Date    HX APPENDECTOMY      HX ORTHOPAEDIC      bilateral carpal tunnel    HX TONSILLECTOMY            ALLERGIES     Allergies   Allergen Reactions    Egg Unknown (comments)     Patient stated she is allergic. Unable to describe reaction type.      Pcn [Penicillins] Itching    Sulfa (Sulfonamide Antibiotics) Itching          FAMILY HISTORY     Family History   Problem Relation Age of Onset   24 [facility] Cancer Father     negative for cardiac disease       SOCIAL HISTORY     Social History     Socioeconomic History    Marital status:      Spouse name: Not on file    Number of children: Not on file    Years of education: Not on file    Highest education level: Not on file   Tobacco Use    Smoking status: Never Smoker    Smokeless tobacco: Never Used   Substance and Sexual Activity    Alcohol use: No    Drug use: No         MEDICATIONS     Current Outpatient Medications   Medication Sig    amLODIPine (NORVASC) 5 mg tablet Take 1 Tab by mouth daily.  amiodarone (CORDARONE) 200 mg tablet Take 1 Tab by mouth daily.  acetaminophen (TYLENOL) 650 mg TbER Take 650 mg by mouth every eight (8) hours.  busPIRone (BUSPAR) 10 mg tablet Take 10 mg by mouth two (2) times a day.  ferrous sulfate 325 mg (65 mg iron) tablet Take 325 mg by mouth daily.  escitalopram oxalate (Lexapro) 20 mg tablet Take 20 mg by mouth daily.  polyethylene glycol (Miralax) 17 gram/dose powder Take 17 g by mouth daily.  ketotifen (Zaditor) 0.025 % (0.035 %) ophthalmic solution Administer 1 Drop to both eyes two (2) times a day. Indications: allergic conjunctivitis     No current facility-administered medications for this visit. I have reviewed the nurses notes, vitals, problem list, allergy list, medical history, family, social history and medications. REVIEW OF SYMPTOMS      General: Pt denies excessive weight gain or loss. Pt is able to conduct ADL's  HEENT: Denies blurred vision, headaches, hearing loss, epistaxis and difficulty swallowing. Respiratory: Denies cough, congestion, shortness of breath, BEASLEY, wheezing or stridor. Cardiovascular: Denies precordial pain, palpitations, edema or PND  Gastrointestinal: Denies poor appetite, indigestion, abdominal pain or blood in stool  Genitourinary: Denies hematuria, dysuria, increased urinary frequency  Musculoskeletal: Denies joint pain or swelling from muscles or joints  Neurologic: Denies tremor, paresthesias, headache, or sensory motor disturbance  Psychiatric: Denies confusion, insomnia, depression  Integumentray: Denies rash, itching or ulcers. Hematologic: Denies easy bruising, bleeding       PHYSICAL EXAMINATION      Vitals: see vitals section  General: Well developed, in no acute distress. HEENT: No jaundice, oral mucosa moist, no oral ulcers  Neck: Supple, no stiffness, no lymphadenopathy, supple  Heart:  Normal S1/S2 negative S3 or S4.  Regular, no murmur, gallop or rub, no jugular venous distention  Respiratory: Clear bilaterally x 4, no wheezing or rales  Abdomen:   Soft, non-tender, bowel sounds are active. Extremities:  No edema, normal cap refill, no cyanosis. Musculoskeletal: No clubbing, no deformities  Neuro: A&Ox3, speech clear, gait stable, cooperative, no focal neurologic deficits  Skin: Skin color is normal. No rashes or lesions. Non diaphoretic, moist.  Vascular: 2+ pulses symmetric in all extremities       DIAGNOSTIC DATA      EKG:        LABORATORY DATA      Lab Results   Component Value Date/Time    WBC 11.6 (H) 12/01/2020 08:11 PM    HGB 13.3 12/01/2020 08:11 PM    HCT 41.0 12/01/2020 08:11 PM    PLATELET 937 47/55/8895 08:11 PM    MCV 94.7 12/01/2020 08:11 PM      Lab Results   Component Value Date/Time    Sodium 143 12/01/2020 08:11 PM    Potassium 4.6 12/01/2020 08:11 PM    Chloride 110 (H) 12/01/2020 08:11 PM    CO2 29 12/01/2020 08:11 PM    Anion gap 4 (L) 12/01/2020 08:11 PM    Glucose 115 (H) 12/01/2020 08:11 PM    BUN 30 (H) 12/01/2020 08:11 PM    Creatinine 0.80 12/01/2020 08:11 PM    BUN/Creatinine ratio 38 (H) 12/01/2020 08:11 PM    GFR est AA >60 12/01/2020 08:11 PM    GFR est non-AA >60 12/01/2020 08:11 PM    Calcium 8.8 12/01/2020 08:11 PM    Bilirubin, total 0.5 12/01/2020 08:11 PM    Alk. phosphatase 109 12/01/2020 08:11 PM    Protein, total 7.0 12/01/2020 08:11 PM    Albumin 3.6 12/01/2020 08:11 PM    Globulin 3.4 12/01/2020 08:11 PM    A-G Ratio 1.1 12/01/2020 08:11 PM    ALT (SGPT) 18 12/01/2020 08:11 PM           ASSESSMENT      1. Atrial fibrillation  2. Dementia, advanced  3. UTI/sepsis  4. Tibia/fibula fracture  5. Hypertension  6. Anemia       PLAN     Given her current medical status and cognitive decline, will continue amiodarone daily for AF suppression. Recommendations to [facility]/PCP for 6 month labwork and PFTs annually sent with paperwork. ICD-10-CM ICD-9-CM    1.  Paroxysmal atrial fibrillation (HCC)  I48.0 427.31 AMB POC EKG ROUTINE W/ 12 LEADS, INTER & REP   2. Sinus arrhythmia  I49.8 427.89    3. History of hypertension  Z86.79 V12.59    4. Dementia with behavioral disturbance, unspecified dementia type ([facility] [address]  F03.91 294.21      Orders Placed This Encounter    AMB POC EKG ROUTINE W/ 12 LEADS, INTER & REP     Order Specific Question:   Reason for Exam:     Answer:   Amio          FOLLOW-UP   1 year or as needed for changes    Thank you, Guero Sylvester MD for allowing me to participate in the care of this extraordinarily pleasant female. Please do not hesitate to contact me for further questions/concerns.      LayROXANNE Garciasébet [facility]  [address] [facility], [facility], [address]  [phone] / [phone] Fax   [phone] / [phone] Fax

## 2020-12-18 NOTE — LETTER
12/18/2020 Patient: Anjali Elaine YOB: 1940 Date of Visit: 12/18/2020 Siddhartha Oliveira MD 
[address] [id number] [address] [employer] Dear Siddhartha Oliveira MD, Thank you for referring Ms. Richie Hernandez to [facility] for evaluation. My notes for this consultation are attached. If you have questions, please do not hesitate to call me. I look forward to following your patient along with you. Sincerely, Lillian Mora MD

## 2020-12-18 NOTE — TELEPHONE ENCOUNTER
Patient's cousin, Pawel Rubio, stated that the patient will be transported from [facility] today and she cannot come with her due to a possible exposure to COVID. She is requesting that she be called during today's appointment. Please advise.     Phone #: [phone]  Thanks

## 2020-12-18 NOTE — PROGRESS NOTES
I saw and evaluated the patient, performing the key elements of the service. I discussed the findings, assessment and plan with the resident and agree with the resident's findings and plan as documented in the resident's note. Also on Lexapro for anxiety.   Recently increased    Wt Readings from Last 3 Encounters:   12/17/20 119 lb (54 kg)   12/01/20 123 lb (55.8 kg)   11/24/20 123 lb (55.8 kg)

## 2020-12-18 NOTE — PROGRESS NOTES
Room 3    Visit Vitals  BP (!) 108/50   Pulse (!) 52   Ht 4' 11\" (1.499 m)   BMI 24.04 kg/m²       Chest pain: no  Shortness of breath: no  Edema: no  Palpitations: no  Dizziness: no    New diagnosis/Surgeries: no    ER/Hospitalizations: no    Refills: ?

## 2020-12-28 ENCOUNTER — HOME VISIT (OUTPATIENT)
Dept: FAMILY MEDICINE CLINIC | Age: 80
End: 2020-12-28
Payer: MEDICARE

## 2020-12-28 DIAGNOSIS — F41.9 ANXIETY: ICD-10-CM

## 2020-12-28 DIAGNOSIS — M79.605 PAIN OF LEFT LOWER EXTREMITY: Primary | ICD-10-CM

## 2020-12-28 DIAGNOSIS — F03.91 DEMENTIA WITH BEHAVIORAL DISTURBANCE, UNSPECIFIED DEMENTIA TYPE: ICD-10-CM

## 2020-12-28 DIAGNOSIS — M62.462 CONTRACTURE OF MUSCLE OF LEFT LOWER LEG: ICD-10-CM

## 2020-12-28 PROCEDURE — 99309 SBSQ NF CARE MODERATE MDM 30: CPT | Performed by: FAMILY MEDICINE

## 2020-12-28 PROCEDURE — G8432 DEP SCR NOT DOC, RNG: HCPCS | Performed by: FAMILY MEDICINE

## 2020-12-28 PROCEDURE — 1101F PT FALLS ASSESS-DOCD LE1/YR: CPT | Performed by: FAMILY MEDICINE

## 2020-12-28 PROCEDURE — G8536 NO DOC ELDER MAL SCRN: HCPCS | Performed by: FAMILY MEDICINE

## 2020-12-28 RX ORDER — OXYCODONE HYDROCHLORIDE 5 MG/1
5 TABLET ORAL
Qty: 21 TAB | Refills: 0 | Status: SHIPPED | OUTPATIENT
Start: 2020-12-28 | End: 2021-01-04

## 2020-12-28 NOTE — PROGRESS NOTES
Mirella Hennessy  79 y.o. female  1940  [place] [address] [facility]  [id number]   [address] @ [facility]  Recertification Progress Note  Josi Domínguez MD       Encounter Date: 12/28/2020    Chief Complaint   Patient presents with    Leg Pain     History of Present Illness   Mirella Hennessy is a 79 y.o. female who was seen at [facility] today for left leg pain and contracture. Patient continues to scream with any manipulation or movement of the leg. She holds the leg in flexion at approximately 90 degrees. She states it hurts all the time. Nursing staff confirms leg pain with movement. Notes pain along left lateral knee even with light touch. They have not seen any erythema or rash. Patient is wheelchair bound. Review of Systems   Review of Systems   Unable to perform ROS: Dementia   Musculoskeletal:        Left leg pain and contracture     Vitals/Objective: There were no vitals filed for this visit. There is no height or weight on file to calculate BMI. Physical Exam  Musculoskeletal:      Right knee: Tenderness found. Medial joint line tenderness noted. Left knee: Tenderness (generalized) found. Right lower leg: No edema. Left lower leg: She exhibits tenderness. No edema. Comments: Pain is inconsistent on palpation. ROM limited on left lower extremity contracture at 90 degrees. No edema or erythema. No deformity on exam.      Psychiatric:         Mood and Affect: Mood is anxious. Cognition and Memory: Cognition is impaired. Memory is impaired. Comments: Reactions out of proportion to exam.  Goes from screaming to speaking in normal sentences. Assessment and Plan:   1. Pain of left lower extremity  2. Contracture of muscle of left lower leg  Tib/Fib fracture in September with tibia intramedullary nail.   Pain is inconsistent on exam and I suspect there are both organic and non-organic causes to her pain. Will get imaging of left leg to rule out recurrent fracture, poor healing or displacement of her hardware. There is no evidence on exam of infection. DDx also includes neuropathic pain. Will provide short term dose of oxycodone for severe pain. Key Pain Meds             oxyCODONE IR (ROXICODONE) 5 mg immediate release tablet (Taking) Take 1 Tab by mouth every eight (8) hours as needed for Pain for up to 7 days. Max Daily Amount: 15 mg.    acetaminophen (TYLENOL) 650 mg TbER Take 650 mg by mouth every eight (8) hours. 3. Anxiety  Treating anxiety. Currently on Lexapro 20mg and recently increased Buspar to 15mg. 4. Dementia with behavioral disturbance, unspecified dementia type [facility])    CODE STATUS: Full    Electronically Signed: Davy Oakes MD     History   Patients past medical, surgical and family histories were reviewed and updated.       Past Medical History:   Diagnosis Date    Arthritis     Atrial fibrillation with RVR ([facility] 12/15/2019    Chronic kidney disease     kidney stones    Chronic pain     right hip    Dementia ([facility] [address] 12/19/2019    HTN (hypertension) 12/16/2019    Hypertension     Ill-defined condition     chronic diarrhea    Weak 12/19/2019    Weakness 1/2/2020     Past Surgical History:   Procedure Laterality Date    HX APPENDECTOMY      HX ORTHOPAEDIC      bilateral carpal tunnel    HX TONSILLECTOMY       Family History   Problem Relation Age of Onset    Cancer Father      Social History     Socioeconomic History    Marital status:      Spouse name: Not on file    Number of children: Not on file    Years of education: Not on file    Highest education level: Not on file   Occupational History    Not on file   Social Needs    Financial resource strain: Not on file    Food insecurity     Worry: Not on file     Inability: Not on file    Transportation needs     Medical: Not on file     Non-medical: Not on file   Tobacco Use    Smoking status: Never Smoker    Smokeless tobacco: Never Used   Substance and Sexual Activity    Alcohol use: No    Drug use: No    Sexual activity: Not on file   Lifestyle    Physical activity     Days per week: Not on file     Minutes per session: Not on file    Stress: Not on file   Relationships    Social connections     Talks on phone: Not on file     Gets together: Not on file     Attends [employer] service: Not on file     Active member of club or organization: Not on file     Attends meetings of clubs or organizations: Not on file     Relationship status: Not on file    Intimate partner violence     Fear of current or ex partner: Not on file     Emotionally abused: Not on file     Physically abused: Not on file     Forced sexual activity: Not on file   Other Topics Concern    Not on file   Social History Narrative    Not on file            Current Medications/Allergies     Current Outpatient Medications   Medication Sig Dispense Refill    amLODIPine (NORVASC) 5 mg tablet Take 1 Tab by mouth daily. 30 Tab 0    amiodarone (CORDARONE) 200 mg tablet Take 1 Tab by mouth daily. 30 Tab 0    acetaminophen (TYLENOL) 650 mg TbER Take 650 mg by mouth every eight (8) hours.  busPIRone (BUSPAR) 10 mg tablet Take 10 mg by mouth two (2) times a day.  ferrous sulfate 325 mg (65 mg iron) tablet Take 325 mg by mouth daily.  escitalopram oxalate (Lexapro) 20 mg tablet Take 20 mg by mouth daily.  polyethylene glycol (Miralax) 17 gram/dose powder Take 17 g by mouth daily.  ketotifen (Zaditor) 0.025 % (0.035 %) ophthalmic solution Administer 1 Drop to both eyes two (2) times a day. Indications: allergic conjunctivitis       Allergies   Allergen Reactions    Egg Unknown (comments)     Patient stated she is allergic. Unable to describe reaction type.      Pcn [Penicillins] Itching    Sulfa (Sulfonamide Antibiotics) Itching

## 2021-01-13 ENCOUNTER — HOME VISIT (OUTPATIENT)
Dept: FAMILY MEDICINE CLINIC | Age: 81
End: 2021-01-13
Payer: MEDICARE

## 2021-01-13 DIAGNOSIS — S82.402D CLOSED FRACTURE OF LEFT TIBIA AND FIBULA WITH ROUTINE HEALING, SUBSEQUENT ENCOUNTER: Primary | ICD-10-CM

## 2021-01-13 DIAGNOSIS — I48.0 PAROXYSMAL ATRIAL FIBRILLATION (HCC): ICD-10-CM

## 2021-01-13 DIAGNOSIS — F03.91 DEMENTIA WITH BEHAVIORAL DISTURBANCE, UNSPECIFIED DEMENTIA TYPE: ICD-10-CM

## 2021-01-13 DIAGNOSIS — R63.4 WEIGHT LOSS: ICD-10-CM

## 2021-01-13 DIAGNOSIS — I10 ESSENTIAL HYPERTENSION: ICD-10-CM

## 2021-01-13 DIAGNOSIS — S82.202D CLOSED FRACTURE OF LEFT TIBIA AND FIBULA WITH ROUTINE HEALING, SUBSEQUENT ENCOUNTER: Primary | ICD-10-CM

## 2021-01-13 PROCEDURE — 1101F PT FALLS ASSESS-DOCD LE1/YR: CPT | Performed by: STUDENT IN AN ORGANIZED HEALTH CARE EDUCATION/TRAINING PROGRAM

## 2021-01-13 PROCEDURE — G8536 NO DOC ELDER MAL SCRN: HCPCS | Performed by: STUDENT IN AN ORGANIZED HEALTH CARE EDUCATION/TRAINING PROGRAM

## 2021-01-13 PROCEDURE — G8432 DEP SCR NOT DOC, RNG: HCPCS | Performed by: STUDENT IN AN ORGANIZED HEALTH CARE EDUCATION/TRAINING PROGRAM

## 2021-01-13 PROCEDURE — 99309 SBSQ NF CARE MODERATE MDM 30: CPT | Performed by: STUDENT IN AN ORGANIZED HEALTH CARE EDUCATION/TRAINING PROGRAM

## 2021-01-13 NOTE — PROGRESS NOTES
Steven Schwarz  79 y.o. female  1940  [place] [address] [facility] [place]  [id number]   [address] @ [facility]  Recertification Progress Note  Krupa Pedroza [place]       Encounter Date: 1/13/2021    No chief complaint on file. History of Present Illness   Steven Schwarz is a 79 y.o. female who was seen at [facility] today for evaluation of screaming outbursts, leg pain, and global decline. Screaming outbursts thought to be a combination of organic and non-organic causes. Repeat xray of left hip and knee was normal on 12/31 after Tib/Fib fracture in September with tibia intramedullary nail. Started low dose Baclofen to help with muscle contracture. Psychiatry increased Remeron dose to 7.5mg qhs to try and help with outbursts. Nursing has noticed a global decline. Her weight is steadily trending down and she requires assistance eating. She is now unable to walk at all. Diet: common adult      total assist  Review of Systems   Review of Systems   Unable to perform ROS: Dementia         Vitals/Objective: There were no vitals filed for this visit. There is no height or weight on file to calculate BMI. Weight is decreasing steadily    Constitutional:       General: She is not in acute distress. Comments: She is sleeping upon entering room and sleepy throughout exam   Musculoskeletal:      Right knee: Tenderness found. Medial joint line tenderness noted. Left knee: Tenderness (generalized) found. Right lower leg: No edema. Left lower leg: She exhibits tenderness. No edema. Comments:   ROM limited on left lower extremity contracture at 90 degrees. No edema or erythema. No deformity on exam.      HENT:      Head: Normocephalic and atraumatic. Eyes:      Conjunctiva/sclera: Conjunctivae normal.   Cardiovascular:      Rate and Rhythm: Normal rate and regular rhythm. Heart sounds: No murmur. No friction rub. No gallop.     Pulmonary: Effort: Pulmonary effort is normal. No respiratory distress. Breath sounds: Normal breath sounds. No wheezing or rales. Abdominal:      General: Abdomen is flat. Bowel sounds are normal. There is no distension. Palpations: Abdomen is soft. Tenderness: There is no abdominal tenderness. Skin:     General: Skin is warm and dry. Neurological:      Mental Status: Mental status is at baseline.       Pertinent Lab/Test Results:    Assessment and Plan:   There are no diagnoses linked to this encounter. Left tib/fib fracture s/p fixation: Patient unable to work with PT and is not able to walk. Also considering neuropathic pain. -Baclofen 5mg q hs for muscle spasm  -Tylenol 650mg Q8H PRN and Oxycodone 5mg IR q8hrs prn for severe pain   -F/u with Dr. Jamel Kruse, on 3/8/21    Weight loss and global decline: Weight down to 114 lbs from 119 one month ago. Possibly linked to dementia; requires assistance for meals. TSH, CBC, BMP normal in 11/2020.  -Nutritional supplements w/ meals    PAfib:  No AC due to bleeding risk.  -Amiodarone 200mg daily  -Was previously on metoprolol, which was discontinued due to bradycardia   -F/u with Dr. Ban Sargent on 12/18/2020     HTN: Stable  -Amiodarone 200mg daily, Norvasc 5mg daily      Dementia:   - Follows with psych, last eval 12/10     Chronic hearing Loss: Stable     Anemia: Hgb 13.3 in 11/2020.   -Ferrous sulfate 325mg daily      Anxiety/Depression:   -Buspar 10mg BID and Lexapro 20mg daily and Remeron 7.5 mg qhs     Allergic conjunctivitis: Stable. -Zatidor eye drops BID      CODE STATUS: Full    I have discussed the assessment and plan with the patient and their responsible party as seen in the above orders. They have expressed understanding. Electronically Signed: Stevenson Norris DO     History   Patients past medical, surgical and family histories were reviewed and updated.       Past Medical History:   Diagnosis Date    Arthritis     Atrial fibrillation with RVR ([facility] 12/15/2019    Chronic kidney disease     kidney stones    Chronic pain     right hip    Dementia ([facility] 12/19/2019    HTN (hypertension) 12/16/2019    Hypertension     Ill-defined condition     chronic diarrhea    Weak 12/19/2019    Weakness 1/2/2020     Past Surgical History:   Procedure Laterality Date    HX APPENDECTOMY      HX ORTHOPAEDIC      bilateral carpal tunnel    HX TONSILLECTOMY       Family History   Problem Relation Age of Onset    Cancer Father      Social History     Socioeconomic History    Marital status:      Spouse name: Not on file    Number of children: Not on file    Years of education: Not on file    Highest education level: Not on file   Occupational History    Not on file   Social Needs    Financial resource strain: Not on file    Food insecurity     Worry: Not on file     Inability: Not on file    Transportation needs     Medical: Not on file     Non-medical: Not on file   Tobacco Use    Smoking status: Never Smoker    Smokeless tobacco: Never Used   Substance and Sexual Activity    Alcohol use: No    Drug use: No    Sexual activity: Not on file   Lifestyle    Physical activity     Days per week: Not on file     Minutes per session: Not on file    Stress: Not on file   Relationships    Social connections     Talks on phone: Not on file     Gets together: Not on file     Attends Sabianism service: Not on file     Active member of club or organization: Not on file     Attends meetings of clubs or organizations: Not on file     Relationship status: Not on file    Intimate partner violence     Fear of current or ex partner: Not on file     Emotionally abused: Not on file     Physically abused: Not on file     Forced sexual activity: Not on file   Other Topics Concern    Not on file   Social History Narrative    Not on file            Current Medications/Allergies     Current Outpatient Medications   Medication Sig Dispense Refill    amLODIPine (NORVASC) 5 mg tablet Take 1 Tab by mouth daily. 30 Tab 0    amiodarone (CORDARONE) 200 mg tablet Take 1 Tab by mouth daily. 30 Tab 0    acetaminophen (TYLENOL) 650 mg TbER Take 650 mg by mouth every eight (8) hours.  busPIRone (BUSPAR) 10 mg tablet Take 10 mg by mouth two (2) times a day.  ferrous sulfate 325 mg (65 mg iron) tablet Take 325 mg by mouth daily.  escitalopram oxalate (Lexapro) 20 mg tablet Take 20 mg by mouth daily.  polyethylene glycol (Miralax) 17 gram/dose powder Take 17 g by mouth daily.  ketotifen (Zaditor) 0.025 % (0.035 %) ophthalmic solution Administer 1 Drop to both eyes two (2) times a day. Indications: allergic conjunctivitis       Allergies   Allergen Reactions    Egg Unknown (comments)     Patient stated she is allergic. Unable to describe reaction type.      Pcn [Penicillins] Itching    Sulfa (Sulfonamide Antibiotics) Itching

## 2021-02-11 ENCOUNTER — HOME VISIT (OUTPATIENT)
Dept: FAMILY MEDICINE CLINIC | Age: 81
End: 2021-02-11

## 2022-03-18 PROBLEM — E87.6 HYPOKALEMIA DUE TO LOSS OF POTASSIUM: Status: ACTIVE | Noted: 2019-12-16

## 2022-03-18 PROBLEM — S82.209A FRACTURE OF TIBIA AND FIBULA: Status: ACTIVE | Noted: 2020-09-24

## 2022-03-18 PROBLEM — I48.91 ATRIAL FIBRILLATION WITH RVR (HCC): Status: ACTIVE | Noted: 2019-12-15

## 2022-03-18 PROBLEM — R41.0 CONFUSION: Status: ACTIVE | Noted: 2020-01-02

## 2022-03-18 PROBLEM — I48.0 PAROXYSMAL ATRIAL FIBRILLATION (HCC): Status: ACTIVE | Noted: 2020-12-17

## 2022-03-18 PROBLEM — R53.81 DEBILITY: Status: ACTIVE | Noted: 2019-12-19

## 2022-03-18 PROBLEM — Z86.79 HISTORY OF HYPERTENSION: Status: ACTIVE | Noted: 2020-09-25

## 2022-03-18 PROBLEM — N30.01 ACUTE CYSTITIS WITH HEMATURIA: Status: ACTIVE | Noted: 2020-09-25

## 2022-03-18 PROBLEM — S82.409A FRACTURE OF TIBIA AND FIBULA: Status: ACTIVE | Noted: 2020-09-24

## 2022-03-19 PROBLEM — I49.8 SINUS ARRHYTHMIA: Status: ACTIVE | Noted: 2020-09-25

## 2022-03-19 PROBLEM — F41.9 ANXIETY: Status: ACTIVE | Noted: 2020-09-24

## 2022-03-19 PROBLEM — D72.829 LEUKOCYTOSIS: Status: ACTIVE | Noted: 2019-12-15

## 2022-03-19 PROBLEM — F03.90 DEMENTIA (HCC): Status: ACTIVE | Noted: 2019-12-19

## 2022-03-19 PROBLEM — H10.13 ALLERGIC CONJUNCTIVITIS OF BOTH EYES: Status: ACTIVE | Noted: 2020-12-17

## 2022-03-19 PROBLEM — N20.0 BILATERAL KIDNEY STONES: Status: ACTIVE | Noted: 2019-12-15

## 2022-03-20 PROBLEM — N13.30 HYDRONEPHROSIS OF RIGHT KIDNEY: Status: ACTIVE | Noted: 2019-12-15

## 2022-03-20 PROBLEM — H91.90 HARD OF HEARING: Status: ACTIVE | Noted: 2019-12-19

## 2023-05-17 RX ORDER — POLYETHYLENE GLYCOL 3350 17 G/17G
17 POWDER, FOR SOLUTION ORAL DAILY
COMMUNITY

## 2023-05-17 RX ORDER — FERROUS SULFATE 325(65) MG
325 TABLET ORAL DAILY
COMMUNITY

## 2023-05-17 RX ORDER — BUSPIRONE HYDROCHLORIDE 10 MG/1
10 TABLET ORAL 2 TIMES DAILY
COMMUNITY

## 2023-05-17 RX ORDER — KETOTIFEN FUMARATE 0.35 MG/ML
1 SOLUTION/ DROPS OPHTHALMIC 2 TIMES DAILY
COMMUNITY

## 2023-05-17 RX ORDER — SENNOSIDES 8.6 MG
650 CAPSULE ORAL EVERY 8 HOURS
COMMUNITY

## 2023-05-17 RX ORDER — AMIODARONE HYDROCHLORIDE 200 MG/1
200 TABLET ORAL DAILY
COMMUNITY
Start: 2020-09-29

## 2023-05-17 RX ORDER — ESCITALOPRAM OXALATE 20 MG/1
20 TABLET ORAL DAILY
COMMUNITY

## 2023-05-17 RX ORDER — AMLODIPINE BESYLATE 5 MG/1
5 TABLET ORAL DAILY
COMMUNITY
Start: 2020-11-27

## 2024-02-14 NOTE — PROGRESS NOTES
--------------  DISCHARGE REVIEW    Payor: [employer]  Subscriber #:  [id number]  Authorization Number: [id number]    Yulia Mixon MD (Physician)           DISCHARGE SUMMARY     Shirin Soliman Patient Status:  Inpatient    3/12/1938 MRN [id number]   Location [facility] 5SW/SE Attending Yulia Mixon [facility] ACO and [facility] Problem: Mobility Impaired (Adult and Pediatric)  Goal: *Acute Goals and Plan of Care (Insert Text)  Description  FUNCTIONAL STATUS PRIOR TO ADMISSION: At baseline patient uses rollator. HOME SUPPORT PRIOR TO ADMISSION: The patient lived alone with hired caregiver during daylight hours to provide assistance. Physical Therapy Goals  Initiated 12/19/2019 Weekly Reassessment 12/26/19 (Goals ongoing)  1. Patient will move from supine to sit and sit to supine  in bed with minimal assistance/contact guard assist within 7 day(s). 2.  Patient will transfer from bed to chair and chair to bed with minimal assistance using the least restrictive device within 7 day(s). 3.  Patient will perform sit to stand with minimal assistance/contact guard assist within 7 day(s). 4.  Patient will ambulate with minimal assistance/contact guard assist for 50 feet with the least restrictive device within 7 day(s). 5.  Patient will ascend/descend 4 stairs with 2 handrail(s) with minimal assistance/contact guard assist within 7 day(s). 6.  Patient to maintain sitting balance on edge of bed for at least 5 minutes with min/CGA assist within 7 days. (added 12/26)      Note:   PHYSICAL THERAPY TREATMENT  Patient: Welford Schlatter (23 y.o. female)  Date: 12/27/2019  Diagnosis: Weak [R53.1]  'light-for-dates' infant with signs of fetal malnutrition [P05.00]  Weakness [R53.1]   Weak       Precautions: Fall, WBAT  Chart, physical therapy assessment, plan of care and goals were reviewed. ASSESSMENT  Patient continues with skilled PT services. Pt comes tto sit with mod assist.Pt to stand with min to mod assist.Pt Pt moves very quickly to chair with min to mod assist of 2. Pt with fear of falling causing quick movement. Pt left sitting in chair with alarm in place. PLAN :  Patient continues to benefit from skilled intervention to address the above impairments. Continue treatment per established plan of care.   to address goals. Recommendation for discharge: (in order for the patient to meet his/her long term goals)  Therapy up to 5 days/week in SNF setting    This discharge recommendation:  Has been made in collaboration with the attending provider and/or case management    IF patient discharges home will need the following DME: to be determined (TBD)       SUBJECTIVE:       OBJECTIVE DATA SUMMARY:   Critical Behavior:  Neurologic State: Alert, Confused  Orientation Level: Oriented to person  Cognition: Decreased attention/concentration  Safety/Judgement: Awareness of environment  Functional Mobility Training:  Bed Mobility:     Supine to Sit: Moderate assistance              Transfers:  Sit to Stand: Minimum assistance; Moderate   Stand to Sit: Moderate assistance;Assist x2        Bed to Chair: Moderate assistance;Assist x2                    Balance:  Sitting - Static: Fair (occasional)  Standing: With support  Ambulation/Gait Training:        Ambulation - Level of Assistance: Setup        Gait Abnormalities: Decreased step clearance;Trunk sway increased; Path deviations; Step to gait             Activity Tolerance:   Good and Fair  Please refer to the flowsheet for vital signs taken during this treatment.     After treatment patient left in no apparent distress:   Sitting in chair    COMMUNICATION/COLLABORATION:   The patients plan of care was discussed with: Physical Therapist    Laura Ritter PTA   Time Calculation: 23 mins [facility] ([facility]) - [place], [place] - [address] [facility] [phone], [phone]  [facility] [address] [address]      Phone: [phone]   oral

## (undated) DEVICE — DRAPE C-ARMOUR C-ARM KIT --

## (undated) DEVICE — STRAP,POSITIONING,KNEE/BODY,FOAM,4X60": Brand: MEDLINE

## (undated) DEVICE — COVER LT HNDL PLAS RIG 1 PER PK

## (undated) DEVICE — STERILE POLYISOPRENE POWDER-FREE SURGICAL GLOVES WITH EMOLLIENT COATING: Brand: PROTEXIS

## (undated) DEVICE — BIT DRL L145MM DIA4.2MM NONSTERILE 3 FLUT NDL PNT QUIK CPL

## (undated) DEVICE — STERILE POLYISOPRENE POWDER-FREE SURGICAL GLOVES: Brand: PROTEXIS

## (undated) DEVICE — BIT DRL L330MM DIA4.2MM CALIB 100MM 3 FLUT QUIK CPL

## (undated) DEVICE — DRAPE,EXTREMITY,89X128,STERILE: Brand: MEDLINE

## (undated) DEVICE — 2.5MM DRILL BIT/QC/GOLD/110MM

## (undated) DEVICE — SOLUTION SCRB 2OZ 10% POVIDONE IOD ANTIMIC BTL

## (undated) DEVICE — 4-PORT MANIFOLD: Brand: NEPTUNE 2

## (undated) DEVICE — SOLUTION IRRIG 1000ML H2O STRL BLT

## (undated) DEVICE — DRESSING PETRO W3XL8IN N ADH OIL EMUL GZ CURAD

## (undated) DEVICE — Device

## (undated) DEVICE — ZIMMER® STERILE DISPOSABLE TOURNIQUET CUFF WITH PROTECTIVE SLEEVE AND PLC, DUAL PORT, SINGLE BLADDER, 34 IN. (86 CM)

## (undated) DEVICE — C-ARMOR C-ARM EQUIPMENT COVERS CLEAR STERILE UNIVERSAL FIT 12 PER CASE: Brand: C-ARMOR

## (undated) DEVICE — PACK,CYSTOSCOPY,PK III,SIRUS: Brand: MEDLINE

## (undated) DEVICE — TUBING, SUCTION, 1/4" X 12', STRAIGHT: Brand: MEDLINE

## (undated) DEVICE — SOLUTION IV 1000ML 0.9% SOD CHL

## (undated) DEVICE — GDWIRE UROL STR 150CM FLX TP -- BX/5 SENSOR

## (undated) DEVICE — BANDAGE COMPR W6INXL3YD EXSANGUATION 1 PLY ESMARCH

## (undated) DEVICE — MARKER SKN RUL VIO STRL

## (undated) DEVICE — SYR 10ML LUER LOK 1/5ML GRAD --

## (undated) DEVICE — CYSTO/BLADDER IRRIGATION SET, REGULATING CLAMP

## (undated) DEVICE — BAG COLLECTION FLD OR-TBL NS --

## (undated) DEVICE — SOLUTION IRRIGATION H2O 0797305] ICU MEDICAL INC]

## (undated) DEVICE — SLEEVE SURG DIA145MM SUPRAPATELLAR PROTCT STR APPRCH FOR

## (undated) DEVICE — REM POLYHESIVE ADULT PATIENT RETURN ELECTRODE: Brand: VALLEYLAB

## (undated) DEVICE — INFECTION CONTROL KIT SYS

## (undated) DEVICE — SUT ETHLN 2-0 18IN FS BLK --

## (undated) DEVICE — PAD,ABDOMINAL,5"X9",ST,LF,25/BX: Brand: MEDLINE INDUSTRIES, INC.

## (undated) DEVICE — PREP SKN CHLRAPRP APL 26ML STR --

## (undated) DEVICE — CANISTER, RIGID, 3000CC: Brand: MEDLINE INDUSTRIES, INC.

## (undated) DEVICE — 3M™ IOBAN™ 2 ANTIMICROBIAL INCISE DRAPE 6651EZ: Brand: IOBAN™ 2

## (undated) DEVICE — GOWN,SIRUS,FABRNF,XL,20/CS: Brand: MEDLINE

## (undated) DEVICE — DRESSING,GAUZE,XEROFORM,CURAD,5"X9",ST: Brand: CURAD

## (undated) DEVICE — ROD RM L1150MM DIA2.5MM TI W/ EXTN BALL TIP FOR IM NAIL

## (undated) DEVICE — GOWN,SIRUS,NONRNF,SETINSLV,2XL,18/CS: Brand: MEDLINE

## (undated) DEVICE — JELLY,LUBE,STERILE,FLIP TOP,TUBE,4-OZ: Brand: MEDLINE

## (undated) DEVICE — TELFA ADHESIVE ISLAND DRESSING: Brand: TELFA

## (undated) DEVICE — SPONGE GZ W4XL4IN COT 12 PLY TYP VII WVN C FLD DSGN

## (undated) DEVICE — DRAPE,REIN 53X77,STERILE: Brand: MEDLINE

## (undated) DEVICE — BANDAGE COMPR W6INXL10YD ST M E WHITE/BEIGE

## (undated) DEVICE — SUTURE VCRL SZ 2-0 L27IN ABSRB UD L26MM SH 1/2 CIR J417H